# Patient Record
Sex: MALE | Race: WHITE | NOT HISPANIC OR LATINO | Employment: OTHER | URBAN - METROPOLITAN AREA
[De-identification: names, ages, dates, MRNs, and addresses within clinical notes are randomized per-mention and may not be internally consistent; named-entity substitution may affect disease eponyms.]

---

## 2017-04-24 ENCOUNTER — HOSPITAL ENCOUNTER (OUTPATIENT)
Dept: LAB | Facility: HOSPITAL | Age: 77
Discharge: HOME | End: 2017-04-24
Attending: NURSE PRACTITIONER | Admitting: NURSE PRACTITIONER

## 2017-04-24 LAB
ANION GAP SERPL CALC-SCNC: 14.5 MMOL/L (ref 10–20)
BUN SERPL-MCNC: 16 MG/DL (ref 8–20)
BUN/CREAT SERPL: 10.7 (ref 6.2–20.3)
CALCIUM SERPL-MCNC: 8.7 MG/DL (ref 8.9–10.3)
CHLORIDE SERPL-SCNC: 102 MMOL/L (ref 101–111)
CONV CO2: 29 MMOL/L (ref 22–32)
CREAT 24H UR-MCNC: 272.1 MG/DL
CREAT UR-MCNC: 1.5 MG/DL (ref 0.7–1.2)
ERYTHROCYTE [DISTWIDTH] IN BLOOD BY AUTOMATED COUNT: 15.4 % (ref 11.5–14.5)
GLUCOSE SERPL-MCNC: 103 MG/DL (ref 65–99)
HCT VFR BLD AUTO: 37.6 % (ref 40–54)
HGB BLD-MCNC: 12.7 G/DL (ref 14–18)
MCH RBC QN AUTO: 32.4 PG (ref 26–32)
MCHC RBC AUTO-ENTMCNC: 33.8 G/DL (ref 32–36)
MCV RBC AUTO: 95.9 FL (ref 80–94)
PLATELET # BLD AUTO: ABNORMAL 10*3/UL (ref 150–450)
PMV BLD AUTO: 8.4 FL (ref 7.4–10.4)
POTASSIUM SERPL-SCNC: 4.5 MMOL/L (ref 3.6–5.1)
PROT UR-MCNC: 23 MG/DL
RBC # BLD AUTO: ABNORMAL 10*6/UL (ref 4.6–6)
SODIUM SERPL-SCNC: 141 MMOL/L (ref 136–144)
WBC # BLD AUTO: 8.3 10*3/UL (ref 4.5–11.5)

## 2017-04-25 ENCOUNTER — HOSPITAL ENCOUNTER (OUTPATIENT)
Dept: LAB | Facility: HOSPITAL | Age: 77
Setting detail: SPECIMEN
Discharge: HOME | End: 2017-04-25
Attending: NURSE PRACTITIONER | Admitting: NURSE PRACTITIONER

## 2017-04-25 LAB
BACTERIA SPEC AEROBE CULT: NORMAL
BILIRUB UR QL STRIP: NEGATIVE MG/DL
CASTS URNS QL MICRO: ABNORMAL /[LPF]
COLOR UR: YELLOW
CONV BACTERIA IN URINE MICRO: ABNORMAL
CONV CLARITY OF URINE: ABNORMAL
CONV HYALINE CASTS IN URINE MICRO: 5 /[LPF] (ref 0–5)
CONV PROTEIN IN URINE BY AUTOMATED TEST STRIP: NEGATIVE MG/DL
CONV SMALL ROUND CELLS: ABNORMAL /[HPF]
CONV UROBILINOGEN IN URINE BY AUTOMATED TEST STRIP: 1 MG/DL
CULTURE INDICATED?: ABNORMAL
GLUCOSE UR QL: NEGATIVE MG/DL
HGB UR QL STRIP: NEGATIVE
KETONES UR QL STRIP: NEGATIVE MG/DL
LEUKOCYTE ESTERASE UR QL STRIP: ABNORMAL
Lab: NORMAL
MICRO REPORT STATUS: NORMAL
NITRITE UR QL STRIP: POSITIVE
PH UR STRIP.AUTO: 7.5 [PH] (ref 4.5–8)
RBC #/AREA URNS HPF: 2 /[HPF] (ref 0–3)
SP GR UR: 1.01 (ref 1–1.03)
SPECIMEN SOURCE: NORMAL
SPERM URNS QL MICRO: ABNORMAL /[HPF]
SQUAMOUS SPT QL MICRO: 5 /[HPF] (ref 0–5)
UNIDENT CRYS URNS QL MICRO: ABNORMAL /[HPF]
WBC #/AREA URNS HPF: 5 /[HPF] (ref 0–5)
YEAST SPEC QL WET PREP: ABNORMAL /[HPF]

## 2017-05-16 ENCOUNTER — HOSPITAL ENCOUNTER (OUTPATIENT)
Dept: PREADMISSION TESTING | Facility: HOSPITAL | Age: 77
Discharge: HOME | End: 2017-05-16
Attending: INTERNAL MEDICINE | Admitting: INTERNAL MEDICINE

## 2017-05-16 LAB
ANION GAP SERPL CALC-SCNC: 10.5 MMOL/L (ref 10–20)
BUN SERPL-MCNC: 22 MG/DL (ref 8–20)
BUN/CREAT SERPL: 14.7 (ref 6.2–20.3)
CALCIUM SERPL-MCNC: 8.6 MG/DL (ref 8.9–10.3)
CHLORIDE SERPL-SCNC: 105 MMOL/L (ref 101–111)
CONV CO2: 28 MMOL/L (ref 22–32)
CREAT UR-MCNC: 1.5 MG/DL (ref 0.7–1.2)
GLUCOSE SERPL-MCNC: 115 MG/DL (ref 65–99)
MAGNESIUM SERPL-MCNC: 2.1 MG/DL (ref 1.8–2.5)
POTASSIUM SERPL-SCNC: 4.5 MMOL/L (ref 3.6–5.1)
SODIUM SERPL-SCNC: 139 MMOL/L (ref 136–144)

## 2017-08-07 ENCOUNTER — HOSPITAL ENCOUNTER (OUTPATIENT)
Dept: LAB | Facility: HOSPITAL | Age: 77
Discharge: HOME | End: 2017-08-07
Attending: NURSE PRACTITIONER | Admitting: NURSE PRACTITIONER

## 2017-08-07 LAB
ANION GAP SERPL CALC-SCNC: 14.4 MMOL/L (ref 10–20)
BACTERIA SPEC AEROBE CULT: NORMAL
BASOPHILS # BLD AUTO: 0 10*3/UL (ref 0–0.2)
BASOPHILS NFR BLD AUTO: 0 % (ref 0–2)
BILIRUB UR QL STRIP: NEGATIVE MG/DL
BUN SERPL-MCNC: 21 MG/DL (ref 8–20)
BUN/CREAT SERPL: 13.1 (ref 6.2–20.3)
CALCIUM SERPL-MCNC: 9.1 MG/DL (ref 8.9–10.3)
CASTS URNS QL MICRO: ABNORMAL /[LPF]
CHLORIDE SERPL-SCNC: 102 MMOL/L (ref 101–111)
COLOR UR: YELLOW
CONV BACTERIA IN URINE MICRO: ABNORMAL
CONV CLARITY OF URINE: ABNORMAL
CONV CO2: 26 MMOL/L (ref 22–32)
CONV HYALINE CASTS IN URINE MICRO: 3 /[LPF] (ref 0–5)
CONV PROTEIN IN URINE BY AUTOMATED TEST STRIP: NEGATIVE MG/DL
CONV SMALL ROUND CELLS: ABNORMAL /[HPF]
CONV UROBILINOGEN IN URINE BY AUTOMATED TEST STRIP: 0.2 MG/DL
CREAT 24H UR-MCNC: 153.6 MG/DL
CREAT UR-MCNC: 1.6 MG/DL (ref 0.7–1.2)
CULTURE INDICATED?: ABNORMAL
DIFFERENTIAL METHOD BLD: (no result)
EOSINOPHIL # BLD AUTO: 0.1 10*3/UL (ref 0–0.3)
EOSINOPHIL # BLD AUTO: 2 % (ref 0–3)
ERYTHROCYTE [DISTWIDTH] IN BLOOD BY AUTOMATED COUNT: 16.5 % (ref 11.5–14.5)
GLUCOSE SERPL-MCNC: 141 MG/DL (ref 65–99)
GLUCOSE UR QL: NEGATIVE MG/DL
HCT VFR BLD AUTO: 34.9 % (ref 40–54)
HGB BLD-MCNC: 11.2 G/DL (ref 14–18)
HGB UR QL STRIP: NEGATIVE
KETONES UR QL STRIP: NEGATIVE MG/DL
LEUKOCYTE ESTERASE UR QL STRIP: ABNORMAL
LYMPHOCYTES # BLD AUTO: 1 10*3/UL (ref 0.8–4.8)
LYMPHOCYTES NFR BLD AUTO: 11 % (ref 18–42)
Lab: NORMAL
MCH RBC QN AUTO: 30.1 PG (ref 26–32)
MCHC RBC AUTO-ENTMCNC: 32.1 G/DL (ref 32–36)
MCV RBC AUTO: 93.7 FL (ref 80–94)
MICRO REPORT STATUS: NORMAL
MONOCYTES # BLD AUTO: 0.8 10*3/UL (ref 0.1–1.3)
MONOCYTES NFR BLD AUTO: 10 % (ref 2–11)
NEUTROPHILS # BLD AUTO: 6.7 10*3/UL (ref 2.3–8.6)
NEUTROPHILS NFR BLD AUTO: 77 % (ref 50–75)
NITRITE UR QL STRIP: NEGATIVE
NRBC BLD AUTO-RTO: 0 /100{WBCS}
NRBC/RBC NFR BLD MANUAL: 0 10*3/UL
PH UR STRIP.AUTO: 5.5 [PH] (ref 4.5–8)
PHOSPHATE SERPL-MCNC: 4 MG/DL (ref 2.4–4.7)
PLATELET # BLD AUTO: 336 10*3/UL (ref 150–450)
PMV BLD AUTO: 7.5 FL (ref 7.4–10.4)
POTASSIUM SERPL-SCNC: 4.4 MMOL/L (ref 3.6–5.1)
PROT UR-MCNC: 9 MG/DL
PTH-INTACT SERPL-MCNC: 85 PG/ML (ref 11–72)
RBC # BLD AUTO: 3.72 10*6/UL (ref 4.6–6)
RBC #/AREA URNS HPF: 11 /[HPF] (ref 0–3)
SODIUM SERPL-SCNC: 138 MMOL/L (ref 136–144)
SP GR UR: 1.01 (ref 1–1.03)
SPECIMEN SOURCE: NORMAL
SPERM URNS QL MICRO: ABNORMAL /[HPF]
SQUAMOUS SPT QL MICRO: 3 /[HPF] (ref 0–5)
UNIDENT CRYS URNS QL MICRO: ABNORMAL /[HPF]
URATE SERPL-MCNC: 5.9 MG/DL (ref 4.8–8.7)
WBC # BLD AUTO: 8.7 10*3/UL (ref 4.5–11.5)
WBC #/AREA URNS HPF: 45 /[HPF] (ref 0–5)
YEAST SPEC QL WET PREP: ABNORMAL /[HPF]

## 2017-09-15 ENCOUNTER — HOSPITAL ENCOUNTER (OUTPATIENT)
Dept: URGENT CARE | Facility: CLINIC | Age: 77
Setting detail: SPECIMEN
Discharge: HOME | End: 2017-09-15
Attending: FAMILY MEDICINE | Admitting: FAMILY MEDICINE

## 2017-09-27 ENCOUNTER — HOSPITAL ENCOUNTER (OUTPATIENT)
Dept: WOUND CARE | Facility: HOSPITAL | Age: 77
Discharge: HOME | End: 2017-09-27
Attending: NURSE PRACTITIONER | Admitting: NURSE PRACTITIONER

## 2017-09-29 ENCOUNTER — HOSPITAL ENCOUNTER (OUTPATIENT)
Dept: CARDIOLOGY | Facility: HOSPITAL | Age: 77
Discharge: HOME | End: 2017-09-29
Attending: NURSE PRACTITIONER | Admitting: NURSE PRACTITIONER

## 2017-10-02 ENCOUNTER — HOSPITAL ENCOUNTER (OUTPATIENT)
Dept: WOUND CARE | Facility: HOSPITAL | Age: 77
Discharge: HOME | End: 2017-10-02
Attending: PODIATRIST | Admitting: PODIATRIST

## 2017-10-06 ENCOUNTER — HOSPITAL ENCOUNTER (OUTPATIENT)
Dept: WOUND CARE | Facility: HOSPITAL | Age: 77
Discharge: HOME | End: 2017-10-06
Attending: NURSE PRACTITIONER | Admitting: NURSE PRACTITIONER

## 2017-10-09 ENCOUNTER — HOSPITAL ENCOUNTER (OUTPATIENT)
Dept: WOUND CARE | Facility: HOSPITAL | Age: 77
Discharge: HOME | End: 2017-10-09
Attending: NURSE PRACTITIONER | Admitting: NURSE PRACTITIONER

## 2017-10-12 ENCOUNTER — HOSPITAL ENCOUNTER (OUTPATIENT)
Dept: WOUND CARE | Facility: HOSPITAL | Age: 77
Discharge: HOME | End: 2017-10-12
Attending: NURSE PRACTITIONER | Admitting: NURSE PRACTITIONER

## 2017-10-16 ENCOUNTER — HOSPITAL ENCOUNTER (OUTPATIENT)
Dept: WOUND CARE | Facility: HOSPITAL | Age: 77
Discharge: HOME | End: 2017-10-16
Attending: NURSE PRACTITIONER | Admitting: NURSE PRACTITIONER

## 2017-10-18 ENCOUNTER — HOSPITAL ENCOUNTER (OUTPATIENT)
Dept: WOUND CARE | Facility: HOSPITAL | Age: 77
Discharge: HOME | End: 2017-10-18
Attending: NURSE PRACTITIONER | Admitting: NURSE PRACTITIONER

## 2017-10-23 ENCOUNTER — HOSPITAL ENCOUNTER (OUTPATIENT)
Dept: WOUND CARE | Facility: HOSPITAL | Age: 77
Discharge: HOME | End: 2017-10-23
Attending: NURSE PRACTITIONER | Admitting: NURSE PRACTITIONER

## 2017-10-25 ENCOUNTER — HOSPITAL ENCOUNTER (OUTPATIENT)
Dept: WOUND CARE | Facility: HOSPITAL | Age: 77
Discharge: HOME | End: 2017-10-25
Attending: NURSE PRACTITIONER | Admitting: NURSE PRACTITIONER

## 2017-10-27 ENCOUNTER — HOSPITAL ENCOUNTER (OUTPATIENT)
Dept: WOUND CARE | Facility: HOSPITAL | Age: 77
Discharge: HOME | End: 2017-10-27
Attending: NURSE PRACTITIONER | Admitting: NURSE PRACTITIONER

## 2017-10-31 ENCOUNTER — HOSPITAL ENCOUNTER (OUTPATIENT)
Dept: WOUND CARE | Facility: HOSPITAL | Age: 77
Discharge: HOME | End: 2017-10-31
Attending: NURSE PRACTITIONER | Admitting: NURSE PRACTITIONER

## 2017-11-03 ENCOUNTER — HOSPITAL ENCOUNTER (OUTPATIENT)
Dept: WOUND CARE | Facility: HOSPITAL | Age: 77
Discharge: HOME | End: 2017-11-03
Attending: NURSE PRACTITIONER | Admitting: NURSE PRACTITIONER

## 2017-11-07 ENCOUNTER — HOSPITAL ENCOUNTER (OUTPATIENT)
Dept: WOUND CARE | Facility: HOSPITAL | Age: 77
Discharge: HOME | End: 2017-11-07
Attending: NURSE PRACTITIONER | Admitting: NURSE PRACTITIONER

## 2017-11-10 ENCOUNTER — HOSPITAL ENCOUNTER (OUTPATIENT)
Dept: WOUND CARE | Facility: HOSPITAL | Age: 77
Discharge: HOME | End: 2017-11-10
Attending: NURSE PRACTITIONER | Admitting: NURSE PRACTITIONER

## 2017-11-14 ENCOUNTER — HOSPITAL ENCOUNTER (OUTPATIENT)
Dept: WOUND CARE | Facility: HOSPITAL | Age: 77
Discharge: HOME | End: 2017-11-14
Attending: NURSE PRACTITIONER | Admitting: NURSE PRACTITIONER

## 2017-11-17 ENCOUNTER — HOSPITAL ENCOUNTER (OUTPATIENT)
Dept: WOUND CARE | Facility: HOSPITAL | Age: 77
Discharge: HOME | End: 2017-11-17
Attending: NURSE PRACTITIONER | Admitting: NURSE PRACTITIONER

## 2017-11-24 ENCOUNTER — HOSPITAL ENCOUNTER (OUTPATIENT)
Dept: WOUND CARE | Facility: HOSPITAL | Age: 77
Discharge: HOME | End: 2017-11-24
Attending: NURSE PRACTITIONER | Admitting: NURSE PRACTITIONER

## 2017-12-01 ENCOUNTER — HOSPITAL ENCOUNTER (OUTPATIENT)
Dept: WOUND CARE | Facility: HOSPITAL | Age: 77
Discharge: HOME | End: 2017-12-01
Attending: NURSE PRACTITIONER | Admitting: NURSE PRACTITIONER

## 2018-02-01 ENCOUNTER — HOSPITAL ENCOUNTER (OUTPATIENT)
Dept: LAB | Facility: HOSPITAL | Age: 78
Discharge: HOME | End: 2018-02-01
Attending: INTERNAL MEDICINE | Admitting: INTERNAL MEDICINE

## 2018-02-01 LAB
ANION GAP SERPL CALC-SCNC: 10.5 MMOL/L (ref 10–20)
BUN SERPL-MCNC: 28 MG/DL (ref 8–20)
BUN/CREAT SERPL: 14.7 (ref 6.2–20.3)
CALCIUM SERPL-MCNC: 8.6 MG/DL (ref 8.9–10.3)
CHLORIDE SERPL-SCNC: 103 MMOL/L (ref 101–111)
CONV CO2: 26 MMOL/L (ref 22–32)
CREAT UR-MCNC: 1.9 MG/DL (ref 0.7–1.2)
ERYTHROCYTE [DISTWIDTH] IN BLOOD BY AUTOMATED COUNT: 18.5 % (ref 11.5–14.5)
GLUCOSE SERPL-MCNC: 152 MG/DL (ref 65–99)
HCT VFR BLD AUTO: 31.9 % (ref 40–54)
HGB BLD-MCNC: 10.1 G/DL (ref 14–18)
MCH RBC QN AUTO: 27.3 PG (ref 26–32)
MCHC RBC AUTO-ENTMCNC: 31.8 G/DL (ref 32–36)
MCV RBC AUTO: 85.8 FL (ref 80–94)
PHOSPHATE SERPL-MCNC: 3.7 MG/DL (ref 2.4–4.7)
PLATELET # BLD AUTO: ABNORMAL 10*3/UL (ref 150–450)
PMV BLD AUTO: 7.2 FL (ref 7.4–10.4)
POTASSIUM SERPL-SCNC: 4.5 MMOL/L (ref 3.6–5.1)
RBC # BLD AUTO: 3.72 10*6/UL (ref 4.6–6)
SODIUM SERPL-SCNC: 135 MMOL/L (ref 136–144)
WBC # BLD AUTO: 7.4 10*3/UL (ref 4.5–11.5)

## 2018-07-26 ENCOUNTER — HOSPITAL ENCOUNTER (OUTPATIENT)
Dept: LAB | Facility: HOSPITAL | Age: 78
Discharge: HOME | End: 2018-07-26
Attending: INTERNAL MEDICINE | Admitting: INTERNAL MEDICINE

## 2018-07-26 LAB
AMPICILLIN SUSC ISLT: ABNORMAL
ANION GAP SERPL CALC-SCNC: 10 MMOL/L (ref 10–20)
AZTREONAM SUSC ISLT: ABNORMAL
BACTERIA ISLT: ABNORMAL
BACTERIA SPEC AEROBE CULT: ABNORMAL
BILIRUB UR QL STRIP: NEGATIVE MG/DL
BUN SERPL-MCNC: 17 MG/DL (ref 8–20)
BUN/CREAT SERPL: 8.9 (ref 6.2–20.3)
CALCIUM SERPL-MCNC: 9.9 MG/DL (ref 8.9–10.3)
CASTS URNS QL MICRO: ABNORMAL /[LPF]
CEFAZOLIN SUSC ISLT: ABNORMAL
CEFEPIME SUSC ISLT: ABNORMAL
CEFTRIAXONE SUSC ISLT: ABNORMAL
CHLORIDE SERPL-SCNC: 104 MMOL/L (ref 101–111)
CIPROFLOXACIN SUSC ISLT: ABNORMAL
COLONY COUNT: ABNORMAL
COLOR UR: YELLOW
CONV BACTERIA IN URINE MICRO: ABNORMAL
CONV CLARITY OF URINE: CLEAR
CONV CO2: 26 MMOL/L (ref 22–32)
CONV HYALINE CASTS IN URINE MICRO: 3 /[LPF] (ref 0–5)
CONV PROTEIN IN URINE BY AUTOMATED TEST STRIP: NEGATIVE MG/DL
CONV SMALL ROUND CELLS: ABNORMAL /[HPF]
CONV UROBILINOGEN IN URINE BY AUTOMATED TEST STRIP: 0.2 MG/DL
CREAT 24H UR-MCNC: 111.3 MG/DL
CREAT UR-MCNC: 1.9 MG/DL (ref 0.7–1.2)
CULTURE INDICATED?: ABNORMAL
ERYTHROCYTE [DISTWIDTH] IN BLOOD BY AUTOMATED COUNT: 14.6 % (ref 11.5–14.5)
GLUCOSE SERPL-MCNC: 101 MG/DL (ref 65–99)
GLUCOSE UR QL: NEGATIVE MG/DL
HCT VFR BLD AUTO: 39.2 % (ref 40–54)
HGB BLD-MCNC: 13 G/DL (ref 14–18)
HGB UR QL STRIP: NEGATIVE
KETONES UR QL STRIP: NEGATIVE MG/DL
LEUKOCYTE ESTERASE UR QL STRIP: ABNORMAL
LEVOFLOXACIN SUSC ISLT: ABNORMAL
Lab: ABNORMAL
MCH RBC QN AUTO: 33.5 PG (ref 26–32)
MCHC RBC AUTO-ENTMCNC: 33 G/DL (ref 32–36)
MCV RBC AUTO: 101.5 FL (ref 80–94)
MEROPENEM SUSC ISLT: ABNORMAL
MICRO REPORT STATUS: ABNORMAL
NITRITE UR QL STRIP: NEGATIVE
NITROFURANTOIN SUSC ISLT: ABNORMAL
PH UR STRIP.AUTO: 5.5 [PH] (ref 4.5–8)
PIP+TAZO SUSC ISLT: ABNORMAL
PLATELET # BLD AUTO: 247 10*3/UL (ref 150–450)
PMV BLD AUTO: 9.1 FL (ref 7.4–10.4)
POTASSIUM SERPL-SCNC: 4 MMOL/L (ref 3.6–5.1)
PROT UR-MCNC: 6 MG/DL
PROT/CREAT UR: 0.1 MG/MG (ref 0–22)
RBC # BLD AUTO: 3.87 10*6/UL (ref 4.6–6)
RBC #/AREA URNS HPF: 1 /[HPF] (ref 0–3)
SODIUM SERPL-SCNC: 136 MMOL/L (ref 136–144)
SP GR UR: 1.01 (ref 1–1.03)
SPECIMEN SOURCE: ABNORMAL
SPERM URNS QL MICRO: ABNORMAL /[HPF]
SQUAMOUS SPT QL MICRO: 7 /[HPF] (ref 0–5)
SUSC METH SPEC: ABNORMAL
TETRACYCLINE SUSC ISLT: ABNORMAL
TOBRAMYCIN SUSC ISLT: ABNORMAL
TRIMETHOPRIM/SULFA: ABNORMAL
UNIDENT CRYS URNS QL MICRO: ABNORMAL /[HPF]
WBC # BLD AUTO: 8.3 10*3/UL (ref 4.5–11.5)
WBC #/AREA URNS HPF: 6 /[HPF] (ref 0–5)
YEAST SPEC QL WET PREP: ABNORMAL /[HPF]

## 2018-09-04 ENCOUNTER — HOSPITAL ENCOUNTER (OUTPATIENT)
Dept: CARDIOLOGY | Facility: HOSPITAL | Age: 78
Discharge: HOME | End: 2018-09-04
Attending: SURGERY | Admitting: SURGERY

## 2018-09-24 ENCOUNTER — HOSPITAL ENCOUNTER (OUTPATIENT)
Dept: LAB | Facility: HOSPITAL | Age: 78
Discharge: HOME | End: 2018-09-24
Attending: INTERNAL MEDICINE | Admitting: INTERNAL MEDICINE

## 2018-09-24 LAB
ALBUMIN SERPL-MCNC: 2.9 G/DL (ref 3.5–4.8)
ALBUMIN/GLOB SERPL: 1.1 {RATIO} (ref 1–1.7)
ALP SERPL-CCNC: 69 IU/L (ref 32–91)
ALT SERPL-CCNC: 9 IU/L (ref 17–63)
ANION GAP SERPL CALC-SCNC: 12.3 MMOL/L (ref 10–20)
AST SERPL-CCNC: 15 IU/L (ref 15–41)
BILIRUB SERPL-MCNC: 0.7 MG/DL (ref 0.3–1.2)
BUN SERPL-MCNC: 18 MG/DL (ref 8–20)
BUN/CREAT SERPL: 8.2 (ref 6.2–20.3)
CALCIUM SERPL-MCNC: 8.6 MG/DL (ref 8.9–10.3)
CHLORIDE SERPL-SCNC: 103 MMOL/L (ref 101–111)
CONV CO2: 27 MMOL/L (ref 22–32)
CONV TOTAL PROTEIN: 5.5 G/DL (ref 6.1–7.9)
CREAT UR-MCNC: 2.2 MG/DL (ref 0.7–1.2)
GLOBULIN UR ELPH-MCNC: 2.6 G/DL (ref 2.5–3.8)
GLUCOSE SERPL-MCNC: 87 MG/DL (ref 65–99)
MAGNESIUM SERPL-MCNC: 2 MG/DL (ref 1.8–2.5)
POTASSIUM SERPL-SCNC: 4.3 MMOL/L (ref 3.6–5.1)
SODIUM SERPL-SCNC: 138 MMOL/L (ref 136–144)

## 2018-10-10 ENCOUNTER — HOSPITAL ENCOUNTER (OUTPATIENT)
Dept: CARDIOLOGY | Facility: HOSPITAL | Age: 78
Discharge: HOME | End: 2018-10-10
Attending: INTERNAL MEDICINE | Admitting: INTERNAL MEDICINE

## 2018-10-16 ENCOUNTER — HOSPITAL ENCOUNTER (OUTPATIENT)
Dept: PREADMISSION TESTING | Facility: HOSPITAL | Age: 78
Discharge: HOME | End: 2018-10-16
Attending: INTERNAL MEDICINE | Admitting: INTERNAL MEDICINE

## 2018-10-16 LAB
ANION GAP SERPL CALC-SCNC: 15 MMOL/L (ref 10–20)
BASOPHILS # BLD AUTO: 0 10*3/UL (ref 0–0.2)
BASOPHILS NFR BLD AUTO: 0 % (ref 0–2)
BUN SERPL-MCNC: 19 MG/DL (ref 8–20)
BUN/CREAT SERPL: 7.9 (ref 6.2–20.3)
CALCIUM SERPL-MCNC: 8.7 MG/DL (ref 8.9–10.3)
CHLORIDE SERPL-SCNC: 106 MMOL/L (ref 101–111)
CHOLEST SERPL-MCNC: 127 MG/DL
CHOLEST/HDLC SERPL: 3.5 {RATIO}
CONV CO2: 26 MMOL/L (ref 22–32)
CONV LDL CHOLESTEROL DIRECT: 77 MG/DL (ref 0–100)
CREAT UR-MCNC: 2.4 MG/DL (ref 0.7–1.2)
DIFFERENTIAL METHOD BLD: (no result)
EOSINOPHIL # BLD AUTO: 0.2 10*3/UL (ref 0–0.3)
EOSINOPHIL # BLD AUTO: 3 % (ref 0–3)
ERYTHROCYTE [DISTWIDTH] IN BLOOD BY AUTOMATED COUNT: 14.6 % (ref 11.5–14.5)
GLUCOSE SERPL-MCNC: 81 MG/DL (ref 65–99)
HCT VFR BLD AUTO: 39.2 % (ref 40–54)
HDLC SERPL-MCNC: 37 MG/DL
HGB BLD-MCNC: 13.2 G/DL (ref 14–18)
INR PPP: 1.1
LDLC/HDLC SERPL: 2.1 {RATIO}
LIPID INTERPRETATION: ABNORMAL
LYMPHOCYTES # BLD AUTO: 1 10*3/UL (ref 0.8–4.8)
LYMPHOCYTES NFR BLD AUTO: 11 % (ref 18–42)
MCH RBC QN AUTO: 35 PG (ref 26–32)
MCHC RBC AUTO-ENTMCNC: 33.7 G/DL (ref 32–36)
MCV RBC AUTO: 103.8 FL (ref 80–94)
MONOCYTES # BLD AUTO: 0.9 10*3/UL (ref 0.1–1.3)
MONOCYTES NFR BLD AUTO: 10 % (ref 2–11)
NEUTROPHILS # BLD AUTO: 6.7 10*3/UL (ref 2.3–8.6)
NEUTROPHILS NFR BLD AUTO: 76 % (ref 50–75)
NRBC BLD AUTO-RTO: 0 /100{WBCS}
NRBC/RBC NFR BLD MANUAL: 0 10*3/UL
PLATELET # BLD AUTO: 229 10*3/UL (ref 150–450)
PMV BLD AUTO: 8.8 FL (ref 7.4–10.4)
POTASSIUM SERPL-SCNC: 4 MMOL/L (ref 3.6–5.1)
PROTHROMBIN TIME: 10.8 SEC (ref 9.6–11.7)
RBC # BLD AUTO: 3.78 10*6/UL (ref 4.6–6)
SODIUM SERPL-SCNC: 143 MMOL/L (ref 136–144)
TRIGL SERPL-MCNC: 154 MG/DL
VLDLC SERPL CALC-MCNC: 12.8 MG/DL
WBC # BLD AUTO: 8.8 10*3/UL (ref 4.5–11.5)

## 2018-12-10 ENCOUNTER — HOSPITAL ENCOUNTER (OUTPATIENT)
Dept: LAB | Facility: HOSPITAL | Age: 78
Discharge: HOME | End: 2018-12-10
Attending: INTERNAL MEDICINE | Admitting: INTERNAL MEDICINE

## 2018-12-10 LAB
ANION GAP SERPL CALC-SCNC: 11.8 MMOL/L (ref 10–20)
BUN SERPL-MCNC: 31 MG/DL (ref 8–20)
BUN/CREAT SERPL: 12.9 (ref 6.2–20.3)
CALCIUM SERPL-MCNC: 9.1 MG/DL (ref 8.9–10.3)
CHLORIDE SERPL-SCNC: 102 MMOL/L (ref 101–111)
CONV CO2: 28 MMOL/L (ref 22–32)
CREAT UR-MCNC: 2.4 MG/DL (ref 0.7–1.2)
GLUCOSE SERPL-MCNC: 78 MG/DL (ref 65–99)
POTASSIUM SERPL-SCNC: 3.8 MMOL/L (ref 3.6–5.1)
SODIUM SERPL-SCNC: 138 MMOL/L (ref 136–144)

## 2019-01-01 ENCOUNTER — CLINICAL SUPPORT NO REQUIREMENTS (OUTPATIENT)
Dept: CARDIOLOGY | Facility: CLINIC | Age: 79
End: 2019-01-01

## 2019-01-01 ENCOUNTER — OFFICE VISIT (OUTPATIENT)
Dept: CARDIOLOGY | Facility: CLINIC | Age: 79
End: 2019-01-01

## 2019-01-01 ENCOUNTER — HOSPITAL ENCOUNTER (OUTPATIENT)
Dept: LAB | Facility: HOSPITAL | Age: 79
Discharge: HOME | End: 2019-02-15
Attending: INTERNAL MEDICINE | Admitting: INTERNAL MEDICINE

## 2019-01-01 ENCOUNTER — TRANSCRIBE ORDERS (OUTPATIENT)
Dept: ADMINISTRATIVE | Facility: HOSPITAL | Age: 79
End: 2019-01-01

## 2019-01-01 ENCOUNTER — LAB (OUTPATIENT)
Dept: LAB | Facility: HOSPITAL | Age: 79
End: 2019-01-01

## 2019-01-01 ENCOUNTER — HOSPITAL ENCOUNTER (OUTPATIENT)
Dept: OTHER | Facility: HOSPITAL | Age: 79
Discharge: HOME | End: 2019-06-11
Attending: FAMILY MEDICINE | Admitting: FAMILY MEDICINE

## 2019-01-01 ENCOUNTER — HOSPITAL ENCOUNTER (OUTPATIENT)
Dept: LAB | Facility: HOSPITAL | Age: 79
Discharge: HOME | End: 2019-04-17
Attending: FAMILY MEDICINE | Admitting: FAMILY MEDICINE

## 2019-01-01 ENCOUNTER — HOSPITAL ENCOUNTER (OUTPATIENT)
Dept: LAB | Facility: HOSPITAL | Age: 79
Discharge: HOME | End: 2019-02-04
Attending: INTERNAL MEDICINE | Admitting: INTERNAL MEDICINE

## 2019-01-01 ENCOUNTER — TELEPHONE (OUTPATIENT)
Dept: CARDIOLOGY | Facility: CLINIC | Age: 79
End: 2019-01-01

## 2019-01-01 ENCOUNTER — HOSPITAL ENCOUNTER (OUTPATIENT)
Dept: LAB | Facility: HOSPITAL | Age: 79
Discharge: HOME | End: 2019-05-31
Attending: INTERNAL MEDICINE | Admitting: INTERNAL MEDICINE

## 2019-01-01 ENCOUNTER — HOSPITAL ENCOUNTER (OUTPATIENT)
Facility: HOSPITAL | Age: 79
Setting detail: HOSPITAL OUTPATIENT SURGERY
Discharge: HOME | End: 2019-09-06
Attending: INTERNAL MEDICINE | Admitting: INTERNAL MEDICINE

## 2019-01-01 ENCOUNTER — HOSPITAL ENCOUNTER (OUTPATIENT)
Dept: CARDIOLOGY | Facility: HOSPITAL | Age: 79
Discharge: HOME | End: 2019-09-03
Admitting: INTERNAL MEDICINE

## 2019-01-01 VITALS
OXYGEN SATURATION: 100 % | BODY MASS INDEX: 24.63 KG/M2 | WEIGHT: 148 LBS | DIASTOLIC BLOOD PRESSURE: 64 MMHG | HEART RATE: 102 BPM | SYSTOLIC BLOOD PRESSURE: 91 MMHG

## 2019-01-01 VITALS
TEMPERATURE: 97.5 F | WEIGHT: 145.72 LBS | HEART RATE: 102 BPM | HEIGHT: 65 IN | SYSTOLIC BLOOD PRESSURE: 101 MMHG | RESPIRATION RATE: 20 BRPM | OXYGEN SATURATION: 79 % | BODY MASS INDEX: 24.28 KG/M2 | DIASTOLIC BLOOD PRESSURE: 73 MMHG

## 2019-01-01 VITALS
SYSTOLIC BLOOD PRESSURE: 104 MMHG | OXYGEN SATURATION: 99 % | HEART RATE: 119 BPM | WEIGHT: 152.75 LBS | DIASTOLIC BLOOD PRESSURE: 71 MMHG | BODY MASS INDEX: 21.92 KG/M2

## 2019-01-01 DIAGNOSIS — N18.30 CRD (CHRONIC RENAL DISEASE), STAGE III (HCC): Primary | ICD-10-CM

## 2019-01-01 DIAGNOSIS — I25.5 ISCHEMIC CARDIOMYOPATHY: ICD-10-CM

## 2019-01-01 DIAGNOSIS — I50.9 CONGESTIVE HEART FAILURE, UNSPECIFIED HF CHRONICITY, UNSPECIFIED HEART FAILURE TYPE (HCC): ICD-10-CM

## 2019-01-01 DIAGNOSIS — I10 ESSENTIAL HYPERTENSION: ICD-10-CM

## 2019-01-01 DIAGNOSIS — E55.9 VITAMIN D DEFICIENCY: ICD-10-CM

## 2019-01-01 DIAGNOSIS — I25.5 ISCHEMIC CARDIOMYOPATHY: Primary | ICD-10-CM

## 2019-01-01 DIAGNOSIS — Z95.810 ICD (IMPLANTABLE CARDIOVERTER-DEFIBRILLATOR), DUAL, IN SITU: Primary | ICD-10-CM

## 2019-01-01 DIAGNOSIS — N18.30 CRD (CHRONIC RENAL DISEASE), STAGE III (HCC): ICD-10-CM

## 2019-01-01 DIAGNOSIS — Z95.810 PRESENCE OF AUTOMATIC CARDIOVERTER/DEFIBRILLATOR (AICD): ICD-10-CM

## 2019-01-01 DIAGNOSIS — Z95.810 PRESENCE OF AUTOMATIC CARDIOVERTER/DEFIBRILLATOR (AICD): Primary | ICD-10-CM

## 2019-01-01 DIAGNOSIS — I42.9 CARDIOMYOPATHY, UNSPECIFIED TYPE (HCC): Primary | ICD-10-CM

## 2019-01-01 DIAGNOSIS — Z95.810 ICD (IMPLANTABLE CARDIOVERTER-DEFIBRILLATOR), DUAL, IN SITU: ICD-10-CM

## 2019-01-01 DIAGNOSIS — Z95.1 HX OF CABG: ICD-10-CM

## 2019-01-01 LAB
25(OH)D3 SERPL-MCNC: 57.5 NG/ML (ref 30–100)
ALBUMIN SERPL-MCNC: 3.1 G/DL (ref 3.5–4.8)
ALBUMIN SERPL-MCNC: 3.3 G/DL (ref 3.5–4.8)
ALBUMIN/GLOB SERPL: 1.1 {RATIO} (ref 1–1.7)
ALBUMIN/GLOB SERPL: 1.2 {RATIO} (ref 1–1.7)
ALP SERPL-CCNC: 60 IU/L (ref 32–91)
ALP SERPL-CCNC: 80 IU/L (ref 32–91)
ALT SERPL-CCNC: 14 IU/L (ref 17–63)
ALT SERPL-CCNC: 16 IU/L (ref 17–63)
ANION GAP SERPL CALC-SCNC: 12.8 MMOL/L (ref 10–20)
ANION GAP SERPL CALC-SCNC: 14.7 MMOL/L (ref 10–20)
ANION GAP SERPL CALC-SCNC: 15.7 MMOL/L (ref 10–20)
ANION GAP SERPL CALC-SCNC: 18.2 MMOL/L (ref 10–20)
ANION GAP SERPL CALCULATED.3IONS-SCNC: 11.5 MMOL/L (ref 5–15)
ANION GAP SERPL CALCULATED.3IONS-SCNC: 14.8 MMOL/L (ref 5–15)
ANISOCYTOSIS BLD QL: ABNORMAL
AST SERPL-CCNC: 17 IU/L (ref 15–41)
AST SERPL-CCNC: 24 IU/L (ref 15–41)
BASOPHILS # BLD AUTO: 0 10*3/UL (ref 0–0.2)
BASOPHILS # BLD AUTO: 0 10*3/UL (ref 0–0.2)
BASOPHILS # BLD MANUAL: 0.07 10*3/MM3 (ref 0–0.2)
BASOPHILS NFR BLD AUTO: 0 % (ref 0–2)
BASOPHILS NFR BLD AUTO: 1 % (ref 0–1.5)
BASOPHILS NFR BLD AUTO: 1 % (ref 0–2)
BILIRUB SERPL-MCNC: 0.9 MG/DL (ref 0.3–1.2)
BILIRUB SERPL-MCNC: 1.3 MG/DL (ref 0.3–1.2)
BUN BLD-MCNC: 23 MG/DL (ref 8–20)
BUN BLD-MCNC: 23 MG/DL (ref 8–23)
BUN SERPL-MCNC: 20 MG/DL (ref 8–20)
BUN SERPL-MCNC: 31 MG/DL (ref 8–20)
BUN SERPL-MCNC: 46 MG/DL (ref 8–20)
BUN SERPL-MCNC: 59 MG/DL (ref 8–20)
BUN/CREAT SERPL: 11 (ref 6.2–20.3)
BUN/CREAT SERPL: 13.5 (ref 6.2–20.3)
BUN/CREAT SERPL: 13.5 (ref 7–25)
BUN/CREAT SERPL: 19.2 (ref 6.2–20.3)
BUN/CREAT SERPL: 26.8 (ref 6.2–20.3)
BUN/CREAT SERPL: 8.3 (ref 6.2–20.3)
BURR CELLS BLD QL SMEAR: ABNORMAL
CALCIUM SERPL-MCNC: 9 MG/DL (ref 8.9–10.3)
CALCIUM SERPL-MCNC: 9 MG/DL (ref 8.9–10.3)
CALCIUM SERPL-MCNC: 9.4 MG/DL (ref 8.9–10.3)
CALCIUM SERPL-MCNC: 9.6 MG/DL (ref 8.9–10.3)
CALCIUM SPEC-SCNC: 9 MG/DL (ref 8.9–10.3)
CALCIUM SPEC-SCNC: 9.2 MG/DL (ref 8.6–10.5)
CHLORIDE SERPL-SCNC: 100 MMOL/L (ref 101–111)
CHLORIDE SERPL-SCNC: 101 MMOL/L (ref 101–111)
CHLORIDE SERPL-SCNC: 102 MMOL/L (ref 101–111)
CHLORIDE SERPL-SCNC: 105 MMOL/L (ref 101–111)
CHLORIDE SERPL-SCNC: 106 MMOL/L (ref 98–107)
CHLORIDE SERPL-SCNC: 99 MMOL/L (ref 101–111)
CHOLEST SERPL-MCNC: 134 MG/DL
CHOLEST/HDLC SERPL: 2.8 {RATIO}
CO2 SERPL-SCNC: 25 MMOL/L (ref 22–32)
CO2 SERPL-SCNC: 27.5 MMOL/L (ref 22–29)
CONV CO2: 24 MMOL/L (ref 22–32)
CONV CO2: 24 MMOL/L (ref 22–32)
CONV CO2: 27 MMOL/L (ref 22–32)
CONV CO2: 28 MMOL/L (ref 22–32)
CONV LDL CHOLESTEROL DIRECT: 79 MG/DL (ref 0–100)
CONV TOTAL PROTEIN: 6 G/DL (ref 6.1–7.9)
CONV TOTAL PROTEIN: 6.1 G/DL (ref 6.1–7.9)
CREAT BLD-MCNC: 1.71 MG/DL (ref 0.76–1.27)
CREAT BLD-MCNC: 2.1 MG/DL (ref 0.7–1.2)
CREAT UR-MCNC: 2.2 MG/DL (ref 0.7–1.2)
CREAT UR-MCNC: 2.3 MG/DL (ref 0.7–1.2)
CREAT UR-MCNC: 2.4 MG/DL (ref 0.7–1.2)
CREAT UR-MCNC: 2.4 MG/DL (ref 0.7–1.2)
DEPRECATED RDW RBC AUTO: 50.8 FL (ref 37–54)
DEPRECATED RDW RBC AUTO: 51.5 FL (ref 37–54)
DIFFERENTIAL METHOD BLD: (no result)
DIFFERENTIAL METHOD BLD: (no result)
EOSINOPHIL # BLD AUTO: 0.1 10*3/UL (ref 0–0.3)
EOSINOPHIL # BLD AUTO: 0.1 10*3/UL (ref 0–0.3)
EOSINOPHIL # BLD AUTO: 1 % (ref 0–3)
EOSINOPHIL # BLD AUTO: 2 % (ref 0–3)
ERYTHROCYTE [DISTWIDTH] IN BLOOD BY AUTOMATED COUNT: 13.1 % (ref 12.3–15.4)
ERYTHROCYTE [DISTWIDTH] IN BLOOD BY AUTOMATED COUNT: 13.6 % (ref 12.3–15.4)
ERYTHROCYTE [DISTWIDTH] IN BLOOD BY AUTOMATED COUNT: 15.6 % (ref 11.5–14.5)
ERYTHROCYTE [DISTWIDTH] IN BLOOD BY AUTOMATED COUNT: 15.8 % (ref 11.5–14.5)
GFR SERPL CREATININE-BSD FRML MDRD: 30 ML/MIN/1.73
GFR SERPL CREATININE-BSD FRML MDRD: 39 ML/MIN/1.73
GLOBULIN UR ELPH-MCNC: 2.8 G/DL (ref 2.5–3.8)
GLOBULIN UR ELPH-MCNC: 2.9 G/DL (ref 2.5–3.8)
GLUCOSE BLD-MCNC: 105 MG/DL (ref 65–99)
GLUCOSE BLD-MCNC: 120 MG/DL (ref 65–99)
GLUCOSE SERPL-MCNC: 103 MG/DL (ref 65–99)
GLUCOSE SERPL-MCNC: 108 MG/DL (ref 65–99)
GLUCOSE SERPL-MCNC: 112 MG/DL (ref 65–99)
GLUCOSE SERPL-MCNC: 117 MG/DL (ref 65–99)
HCT VFR BLD AUTO: 39.3 % (ref 37.5–51)
HCT VFR BLD AUTO: 42.2 % (ref 40–54)
HCT VFR BLD AUTO: 43.8 % (ref 40–54)
HCT VFR BLD AUTO: 45.2 % (ref 37.5–51)
HDLC SERPL-MCNC: 48 MG/DL
HGB BLD-MCNC: 12.8 G/DL (ref 13–17.7)
HGB BLD-MCNC: 13.9 G/DL (ref 14–18)
HGB BLD-MCNC: 14.4 G/DL (ref 14–18)
HGB BLD-MCNC: 14.9 G/DL (ref 13–17.7)
INR PPP: 1.08 (ref 0.9–1.1)
LDLC/HDLC SERPL: 1.6 {RATIO}
LIPID INTERPRETATION: NORMAL
LYMPHOCYTES # BLD AUTO: 0.7 10*3/UL (ref 0.8–4.8)
LYMPHOCYTES # BLD AUTO: 0.8 10*3/UL (ref 0.8–4.8)
LYMPHOCYTES # BLD MANUAL: 0.9 10*3/MM3 (ref 0.7–3.1)
LYMPHOCYTES NFR BLD AUTO: 13 % (ref 18–42)
LYMPHOCYTES NFR BLD AUTO: 9 % (ref 18–42)
LYMPHOCYTES NFR BLD MANUAL: 13.1 % (ref 19.6–45.3)
LYMPHOCYTES NFR BLD MANUAL: 6.1 % (ref 5–12)
MACROCYTES BLD QL SMEAR: ABNORMAL
MAGNESIUM SERPL-MCNC: 2.4 MG/DL (ref 1.8–2.5)
MCH RBC QN AUTO: 34.7 PG (ref 26.6–33)
MCH RBC QN AUTO: 34.7 PG (ref 26–32)
MCH RBC QN AUTO: 34.9 PG (ref 26–32)
MCH RBC QN AUTO: 35.1 PG (ref 26.6–33)
MCHC RBC AUTO-ENTMCNC: 32.6 G/DL (ref 31.5–35.7)
MCHC RBC AUTO-ENTMCNC: 33 G/DL (ref 32–36)
MCHC RBC AUTO-ENTMCNC: 33 G/DL (ref 32–36)
MCHC RBC AUTO-ENTMCNC: 33.1 G/DL (ref 31.5–35.7)
MCV RBC AUTO: 105.3 FL (ref 80–94)
MCV RBC AUTO: 105.8 FL (ref 80–94)
MCV RBC AUTO: 106.1 FL (ref 79–97)
MCV RBC AUTO: 106.5 FL (ref 79–97)
MONOCYTES # BLD AUTO: 0.42 10*3/MM3 (ref 0.1–0.9)
MONOCYTES # BLD AUTO: 0.7 10*3/UL (ref 0.1–1.3)
MONOCYTES # BLD AUTO: 0.8 10*3/UL (ref 0.1–1.3)
MONOCYTES NFR BLD AUTO: 11 % (ref 2–11)
MONOCYTES NFR BLD AUTO: 9 % (ref 2–11)
MRSA SPEC QL CULT: NORMAL
NEUTROPHILS # BLD AUTO: 4.8 10*3/UL (ref 2.3–8.6)
NEUTROPHILS # BLD AUTO: 5.47 10*3/MM3 (ref 1.7–7)
NEUTROPHILS # BLD AUTO: 6.9 10*3/UL (ref 2.3–8.6)
NEUTROPHILS NFR BLD AUTO: 74 % (ref 50–75)
NEUTROPHILS NFR BLD AUTO: 80 % (ref 50–75)
NEUTROPHILS NFR BLD MANUAL: 79.8 % (ref 42.7–76)
NRBC BLD AUTO-RTO: 0 /100{WBCS}
NRBC BLD AUTO-RTO: 0 /100{WBCS}
NRBC/RBC NFR BLD MANUAL: 0 10*3/UL
NRBC/RBC NFR BLD MANUAL: 0 10*3/UL
PHOSPHATE SERPL-MCNC: 4 MG/DL (ref 2.4–4.7)
PHOSPHATE SERPL-MCNC: 4.1 MG/DL (ref 2.5–4.5)
PLAT MORPH BLD: NORMAL
PLATELET # BLD AUTO: 143 10*3/MM3 (ref 140–450)
PLATELET # BLD AUTO: 149 10*3/MM3 (ref 140–450)
PLATELET # BLD AUTO: 185 10*3/UL (ref 150–450)
PLATELET # BLD AUTO: 237 10*3/UL (ref 150–450)
PMV BLD AUTO: 10 FL (ref 6–12)
PMV BLD AUTO: 11.8 FL (ref 6–12)
PMV BLD AUTO: 8.5 FL (ref 7.4–10.4)
PMV BLD AUTO: 8.8 FL (ref 7.4–10.4)
POIKILOCYTOSIS BLD QL SMEAR: ABNORMAL
POTASSIUM BLD-SCNC: 4.8 MMOL/L (ref 3.6–5.1)
POTASSIUM BLD-SCNC: 4.9 MMOL/L (ref 3.5–5.2)
POTASSIUM SERPL-SCNC: 3.7 MMOL/L (ref 3.6–5.1)
POTASSIUM SERPL-SCNC: 3.8 MMOL/L (ref 3.6–5.1)
POTASSIUM SERPL-SCNC: 4.2 MMOL/L (ref 3.6–5.1)
POTASSIUM SERPL-SCNC: 5.7 MMOL/L (ref 3.6–5.1)
PROTHROMBIN TIME: 10.9 SECONDS (ref 9.6–11.7)
PSA FREE MFR SERPL: NORMAL %
PSA FREE SERPL-MCNC: 0.07 NG/ML
PSA SERPL-MCNC: 0.2 NG/ML
PTH-INTACT SERPL-MCNC: 111 PG/ML (ref 11–72)
PTH-INTACT SERPL-MCNC: 57.8 PG/ML (ref 15–65)
RBC # BLD AUTO: 3.69 10*6/MM3 (ref 4.14–5.8)
RBC # BLD AUTO: 3.99 10*6/UL (ref 4.6–6)
RBC # BLD AUTO: 4.16 10*6/UL (ref 4.6–6)
RBC # BLD AUTO: 4.26 10*6/MM3 (ref 4.14–5.8)
SODIUM BLD-SCNC: 140 MMOL/L (ref 136–144)
SODIUM BLD-SCNC: 145 MMOL/L (ref 136–145)
SODIUM SERPL-SCNC: 135 MMOL/L (ref 136–144)
SODIUM SERPL-SCNC: 137 MMOL/L (ref 136–144)
SODIUM SERPL-SCNC: 137 MMOL/L (ref 136–144)
SODIUM SERPL-SCNC: 140 MMOL/L (ref 136–144)
TRIGL SERPL-MCNC: 55 MG/DL
TSH SERPL-ACNC: 3.43 UIU/ML (ref 0.34–5.6)
VLDLC SERPL CALC-MCNC: 7.1 MG/DL
WBC # BLD AUTO: 6.5 10*3/UL (ref 4.5–11.5)
WBC # BLD AUTO: 8.6 10*3/UL (ref 4.5–11.5)
WBC MORPH BLD: NORMAL
WBC NRBC COR # BLD: 6.5 10*3/MM3 (ref 3.4–10.8)
WBC NRBC COR # BLD: 6.86 10*3/MM3 (ref 3.4–10.8)

## 2019-01-01 PROCEDURE — 25010000002 MIDAZOLAM PER 1 MG: Performed by: INTERNAL MEDICINE

## 2019-01-01 PROCEDURE — 25010000002 FENTANYL CITRATE (PF) 100 MCG/2ML SOLUTION: Performed by: INTERNAL MEDICINE

## 2019-01-01 PROCEDURE — 93005 ELECTROCARDIOGRAM TRACING: CPT | Performed by: INTERNAL MEDICINE

## 2019-01-01 PROCEDURE — 85610 PROTHROMBIN TIME: CPT

## 2019-01-01 PROCEDURE — 87081 CULTURE SCREEN ONLY: CPT

## 2019-01-01 PROCEDURE — 33262 RMVL& REPLC PULSE GEN 1 LEAD: CPT | Performed by: INTERNAL MEDICINE

## 2019-01-01 PROCEDURE — 93296 REM INTERROG EVL PM/IDS: CPT | Performed by: INTERNAL MEDICINE

## 2019-01-01 PROCEDURE — 82306 VITAMIN D 25 HYDROXY: CPT

## 2019-01-01 PROCEDURE — C1722 AICD, SINGLE CHAMBER: HCPCS | Performed by: INTERNAL MEDICINE

## 2019-01-01 PROCEDURE — 36415 COLL VENOUS BLD VENIPUNCTURE: CPT

## 2019-01-01 PROCEDURE — 80048 BASIC METABOLIC PNL TOTAL CA: CPT

## 2019-01-01 PROCEDURE — 83970 ASSAY OF PARATHORMONE: CPT

## 2019-01-01 PROCEDURE — 85025 COMPLETE CBC W/AUTO DIFF WBC: CPT

## 2019-01-01 PROCEDURE — 99153 MOD SED SAME PHYS/QHP EA: CPT | Performed by: INTERNAL MEDICINE

## 2019-01-01 PROCEDURE — 99152 MOD SED SAME PHYS/QHP 5/>YRS: CPT | Performed by: INTERNAL MEDICINE

## 2019-01-01 PROCEDURE — 93282 PRGRMG EVAL IMPLANTABLE DFB: CPT | Performed by: INTERNAL MEDICINE

## 2019-01-01 PROCEDURE — 85007 BL SMEAR W/DIFF WBC COUNT: CPT

## 2019-01-01 PROCEDURE — 93010 ELECTROCARDIOGRAM REPORT: CPT | Performed by: INTERNAL MEDICINE

## 2019-01-01 PROCEDURE — 85027 COMPLETE CBC AUTOMATED: CPT

## 2019-01-01 PROCEDURE — 84100 ASSAY OF PHOSPHORUS: CPT

## 2019-01-01 PROCEDURE — 93295 DEV INTERROG REMOTE 1/2/MLT: CPT | Performed by: INTERNAL MEDICINE

## 2019-01-01 PROCEDURE — 93000 ELECTROCARDIOGRAM COMPLETE: CPT | Performed by: INTERNAL MEDICINE

## 2019-01-01 PROCEDURE — 99215 OFFICE O/P EST HI 40 MIN: CPT | Performed by: INTERNAL MEDICINE

## 2019-01-01 PROCEDURE — 99024 POSTOP FOLLOW-UP VISIT: CPT | Performed by: INTERNAL MEDICINE

## 2019-01-01 DEVICE — ICD VISIA AF VR SURESCAN DF1 DVFB1D1: Type: IMPLANTABLE DEVICE | Site: CHEST | Status: FUNCTIONAL

## 2019-01-01 RX ORDER — CARVEDILOL 12.5 MG/1
TABLET ORAL
Qty: 180 TABLET | Refills: 1 | Status: SHIPPED | OUTPATIENT
Start: 2019-01-01 | End: 2020-01-01

## 2019-01-01 RX ORDER — CARVEDILOL 12.5 MG/1
TABLET ORAL
COMMUNITY
Start: 2018-01-05 | End: 2019-01-01 | Stop reason: SDUPTHER

## 2019-01-01 RX ORDER — ATORVASTATIN CALCIUM 10 MG/1
TABLET, FILM COATED ORAL
Qty: 90 TABLET | Refills: 2 | Status: SHIPPED | OUTPATIENT
Start: 2019-01-01 | End: 2020-01-01

## 2019-01-01 RX ORDER — FENTANYL CITRATE 50 UG/ML
INJECTION, SOLUTION INTRAMUSCULAR; INTRAVENOUS AS NEEDED
Status: DISCONTINUED | OUTPATIENT
Start: 2019-01-01 | End: 2019-01-01 | Stop reason: HOSPADM

## 2019-01-01 RX ORDER — SPIRONOLACTONE 25 MG/1
25 TABLET ORAL DAILY
COMMUNITY
Start: 2019-01-01 | End: 2020-01-01 | Stop reason: HOSPADM

## 2019-01-01 RX ORDER — RANITIDINE 150 MG/1
TABLET ORAL
Qty: 60 TABLET | Refills: 3 | Status: SHIPPED | OUTPATIENT
Start: 2019-01-01 | End: 2020-01-01

## 2019-01-01 RX ORDER — ALBUTEROL SULFATE 90 UG/1
1 AEROSOL, METERED RESPIRATORY (INHALATION) EVERY 6 HOURS PRN
COMMUNITY
Start: 2019-01-11

## 2019-01-01 RX ORDER — SODIUM CHLORIDE 0.9 % (FLUSH) 0.9 %
3 SYRINGE (ML) INJECTION EVERY 12 HOURS SCHEDULED
Status: DISCONTINUED | OUTPATIENT
Start: 2019-01-01 | End: 2019-01-01 | Stop reason: HOSPADM

## 2019-01-01 RX ORDER — CLOPIDOGREL BISULFATE 75 MG/1
TABLET ORAL
Qty: 90 TABLET | Refills: 0 | Status: SHIPPED | OUTPATIENT
Start: 2019-01-01 | End: 2020-01-01

## 2019-01-01 RX ORDER — FERROUS SULFATE 325(65) MG
325 TABLET ORAL
COMMUNITY
Start: 2019-01-01

## 2019-01-01 RX ORDER — ASPIRIN 81 MG/1
81 TABLET ORAL DAILY
COMMUNITY
Start: 2014-11-17

## 2019-01-01 RX ORDER — SODIUM CHLORIDE 0.9 % (FLUSH) 0.9 %
10 SYRINGE (ML) INJECTION AS NEEDED
Status: DISCONTINUED | OUTPATIENT
Start: 2019-01-01 | End: 2019-01-01 | Stop reason: HOSPADM

## 2019-01-01 RX ORDER — ERGOCALCIFEROL 1.25 MG/1
50000 CAPSULE ORAL
COMMUNITY
Start: 2019-01-01

## 2019-01-01 RX ORDER — MIDAZOLAM HYDROCHLORIDE 1 MG/ML
INJECTION INTRAMUSCULAR; INTRAVENOUS AS NEEDED
Status: DISCONTINUED | OUTPATIENT
Start: 2019-01-01 | End: 2019-01-01 | Stop reason: HOSPADM

## 2019-01-01 RX ORDER — FUROSEMIDE 40 MG/1
TABLET ORAL
Qty: 180 TABLET | Refills: 0 | Status: SHIPPED | OUTPATIENT
Start: 2019-01-01 | End: 2020-01-01

## 2019-01-01 RX ORDER — CLOPIDOGREL BISULFATE 75 MG/1
TABLET ORAL EVERY 24 HOURS
COMMUNITY
Start: 2018-10-31 | End: 2019-01-01

## 2019-01-01 RX ORDER — AMIODARONE HYDROCHLORIDE 200 MG/1
TABLET ORAL
Qty: 90 TABLET | Refills: 2 | Status: SHIPPED | OUTPATIENT
Start: 2019-01-01 | End: 2020-01-01

## 2019-01-01 RX ORDER — SODIUM CHLORIDE 9 MG/ML
30 INJECTION, SOLUTION INTRAVENOUS CONTINUOUS
Status: DISCONTINUED | OUTPATIENT
Start: 2019-01-01 | End: 2019-01-01 | Stop reason: HOSPADM

## 2019-01-01 RX ORDER — FUROSEMIDE 40 MG/1
40 TABLET ORAL 3 TIMES WEEKLY
COMMUNITY
Start: 2018-01-05 | End: 2019-01-01

## 2019-01-01 RX ADMIN — SODIUM CHLORIDE 30 ML/HR: 900 INJECTION, SOLUTION INTRAVENOUS at 08:47

## 2019-01-01 RX ADMIN — Medication 1 G: at 10:47

## 2019-09-03 PROBLEM — I25.5 ISCHEMIC CARDIOMYOPATHY: Status: ACTIVE | Noted: 2019-01-01

## 2019-09-03 PROBLEM — Z95.810 ICD (IMPLANTABLE CARDIOVERTER-DEFIBRILLATOR), DUAL, IN SITU: Status: ACTIVE | Noted: 2019-01-01

## 2019-09-03 PROBLEM — Z95.810 PRESENCE OF AUTOMATIC CARDIOVERTER/DEFIBRILLATOR (AICD): Status: ACTIVE | Noted: 2019-01-01

## 2019-09-03 NOTE — PROGRESS NOTES
Encounter Date:09/03/2019  Last seen 5/2/2019      Patient ID: Luis E Rahman is a 79 y.o. male.    Chief Complaint:  ICD LAYNE-new problem  Ischemic cardiomyopathy  Status post CABG  Status post ICD      History of Present Illness  Since I have last seen, the patient has been without any chest discomfort ,shortness of breath, palpitations, dizziness or syncope.  Denies having any headache ,abdominal pain ,nausea, vomiting , diarrhea constipation, loss of weight or loss of appetite.  Denies having any excessive bruising ,hematuria or blood in the stool.    Denies having any ICD shocks.    Review of all systems negative except as indicated      Assessment and Plan       ////    Impression  ===   - ischemic cardiomyopathy.      - status post single-chamber ICD 03/05/2010.  Have repositioning of the ventricular lead 07/09/2010.  Status post ICD shocks for ventricular tachycardia.  Patient is doing better with amiodarone and diuresis.  VT zone was reduced to 142 beats per minute    - status post successful ICD shock for ventricular tachycardia 09/24/2018     -history of ICD shock with successful defibrillation.  04/07/2017. Status post ICD shock  x2 for sustained ventricular tachycardia 02/05/2018.  Unsuccessful at 25 joules followed by successful 35 joules shock.Patient was asymptomatic.    ICD has reached LAYNE 7/17/2019    Echocardiogram showed left ventricular dysfunction with ejection fraction of 40%.  Left atrial enlargement is present.  10/10/2018.  Michelle scan Cardiolite test is abnormal with inferior infarction and significant poster lateral ischemia.     -status post CABG 01/2000.    Status post stent to SVG to RCA 10/17/2018   cardiac catheterization 10/17/2018.  LV-gram not performed due to renal dysfunction.  Total LAD and RCA. .  Marginal branch has 30% disease.  Villarreal is atretic.  SVG to marginal branch is patent.  SVG to diagonal branch is patent and is filling LAD also.  SVG to PDA had 95% disease just distal  to the landing site and had intervention.      - dizziness  Probably due to low blood pressure.  -improved with reduce dose of lisinopril.    - compensated congestive heart failure    - history of atrial fibrillation.  Patient has converted and is maintaining sinus rhythm    -status post left iliofemoral thrombectomy and endarterectomy 04/01/2017.  Status post stent to right common iliac artery and left external iliac artery 04/04/2017 P    -dyslipidemia hypertension and history of smoking.  Renal dysfunctionBUN 17 creatinine 1.9    -status post hemorrhoidectomy and hernia repair    -status post impending inferior myocardial infarction prior to RCA stent placement 1997.  ==  Plan  ========  ICD pulse generator has reached LAYNE.  Patient did not have any ICD shocks.  Patient is not having any angina pectoris or congestive heart failure.  Medications were reviewed and updated.  Patient to have ICD pulse generator replacement ([employer])  Risks and benefits pros and cons of procedure were discussed with patient.  Further plan will depend on patient's progress.  [[[[[[[[[[[[[             Diagnosis Plan   1. ICD (implantable cardioverter-defibrillator), dual, in situ  Case Request Cath Lab: ICD DC generator change    CBC (No Diff)    Basic Metabolic Panel    Protime-INR    ECG 12 Lead    MRSA Screen Culture - Swab, Nares   2. Ischemic cardiomyopathy  Case Request Cath Lab: ICD DC generator change    CBC (No Diff)    Basic Metabolic Panel    Protime-INR    ECG 12 Lead    MRSA Screen Culture - Swab, Nares   3. Hx of CABG     LAB RESULTS (LAST 7 DAYS)    CBC  Results from last 7 days   Lab Units 09/03/19  1542   WBC 10*3/mm3 6.50   RBC 10*6/mm3 4.26   HEMOGLOBIN g/dL 14.9   HEMATOCRIT % 45.2   MCV fL 106.1*   PLATELETS 10*3/mm3 143       BMP  Results from last 7 days   Lab Units 09/03/19  1542   SODIUM mmol/L 140   POTASSIUM mmol/L 4.8   CHLORIDE mmol/L 105   CO2 mmol/L 25.0   BUN mg/dL 23*   CREATININE mg/dL 2.10*   GLUCOSE  mg/dL 105*       CMP   Results from last 7 days   Lab Units 09/03/19  1542   SODIUM mmol/L 140   POTASSIUM mmol/L 4.8   CHLORIDE mmol/L 105   CO2 mmol/L 25.0   BUN mg/dL 23*   CREATININE mg/dL 2.10*   GLUCOSE mg/dL 105*         BNP        TROPONIN        CoAg  Results from last 7 days   Lab Units 09/03/19  1542   INR  1.08       Creatinine Clearance  Estimated Creatinine Clearance: 27 mL/min (A) (by C-G formula based on SCr of 2.1 mg/dL (H)).    ABG        Radiology  No radiology results for the last day                The following portions of the patient's history were reviewed and updated as appropriate: allergies, current medications, past family history, past medical history, past social history, past surgical history and problem list.    Review of Systems   Constitution: Negative for malaise/fatigue.   Cardiovascular: Positive for leg swelling (left ). Negative for chest pain, palpitations and syncope.   Respiratory: Positive for shortness of breath.    Skin: Negative for rash.   Gastrointestinal: Negative for nausea and vomiting.   Neurological: Negative for dizziness, light-headedness and numbness.         Current Outpatient Medications:   •  albuterol sulfate HFA (VENTOLIN HFA) 108 (90 Base) MCG/ACT inhaler, VENTOLIN  (90 Base) MCG/ACT AERS, Disp: , Rfl:   •  aspirin (ASPIR-LOW) 81 MG EC tablet, ASPIR-LOW 81 MG TBEC, Disp: , Rfl:   •  B Complex Vitamins (VITAMIN B COMPLEX PO), VITAMIN B COMPLEX TABS, Disp: , Rfl:   •  carvedilol (COREG) 12.5 MG tablet, COREG 12.5 MG TABS, Disp: , Rfl:   •  clopidogrel (PLAVIX) 75 MG tablet, Daily., Disp: , Rfl:   •  furosemide (LASIX) 40 MG tablet, Daily., Disp: , Rfl:   •  spironolactone (ALDACTONE) 25 MG tablet, Daily., Disp: , Rfl:   •  amiodarone (PACERONE) 200 MG tablet, TAKE 1 TABLET BY MOUTH EVERY DAY, Disp: 90 tablet, Rfl: 2  •  atorvastatin (LIPITOR) 10 MG tablet, TAKE ONE TABLET BY MOUTH AT BEDTIME, Disp: 90 tablet, Rfl: 2  •  FEROSUL 325 (65 Fe) MG tablet,  , Disp: , Rfl:   •  raNITIdine (ZANTAC) 150 MG tablet, TAKE 1 TABLET BY MOUTH 2 TIMES EVERY DAY **DISCONTINUE NEXIUM, Disp: 60 tablet, Rfl: 3  •  vitamin D (ERGOCALCIFEROL) 38815 units capsule capsule, , Disp: , Rfl:     No Known Allergies    History reviewed. No pertinent family history.    Past Surgical History:   Procedure Laterality Date   • CARDIAC CATHETERIZATION     • CORONARY ANGIOPLASTY WITH STENT PLACEMENT     • CORONARY ARTERY BYPASS GRAFT     • PACEMAKER REPLACEMENT         Past Medical History:   Diagnosis Date   • Atrial fibrillation (CMS/HCC)    • CHF (congestive heart failure) (CMS/HCC)    • Hyperlipidemia    • Hypertension    • Myocardial infarction (CMS/HCC)        History reviewed. No pertinent family history.    Social History     Socioeconomic History   • Marital status: Single     Spouse name: Not on file   • Number of children: Not on file   • Years of education: Not on file   • Highest education level: Not on file   Tobacco Use   • Smoking status: Current Every Day Smoker   • Smokeless tobacco: Never Used   Substance and Sexual Activity   • Alcohol use: No     Frequency: Never         Procedures      Objective:       Physical Exam    /71 (BP Location: Left arm, Patient Position: Sitting, Cuff Size: Adult)   Pulse 119   Wt 69.3 kg (152 lb 12 oz)   SpO2 99%   BMI 21.92 kg/m²   The patient is alert, oriented and in no distress.    Vital signs as noted above.    Head and neck revealed no carotid bruits or jugular venous distension.  No thyromegaly or lymphadenopathy is present.    Lungs clear.  No wheezing.  Breath sounds are normal bilaterally.    Heart normal first and second heart sounds.  No murmur..  No pericardial rub is present.  No gallop is present.    Abdomen soft and nontender.  No organomegaly is present.    Extremities revealed good peripheral pulses without any pedal edema.    Skin warm and dry.    Musculoskeletal system is grossly normal.    CNS grossly normal.

## 2019-09-19 PROBLEM — I50.9 CONGESTIVE HEART FAILURE (HCC): Status: ACTIVE | Noted: 2019-01-01

## 2019-09-19 NOTE — PROGRESS NOTES
Encounter Date:09/19/2019  Last seen 9/6/2019.  Status post ICD pulse generator replacement      Patient ID: Luis E Rahman is a 79 y.o. male.    Chief Complaint:  Status post ICD  Status post pulse generator replacement      History of Present Illness  Patient recently had ICD pulse generator replacement.  Since I have last seen, the patient has been without any chest discomfort ,shortness of breath, palpitations, dizziness or syncope.  Denies having any headache ,abdominal pain ,nausea, vomiting , diarrhea constipation, loss of weight or loss of appetite.  Denies having any excessive bruising ,hematuria or blood in the stool.    Review of all systems negative except as indicated    Assessment and Plan       ////    Impression  ===   - ischemic cardiomyopathy.       - status post single-chamber ICD 03/05/2010.  Have repositioning of the ventricular lead 07/09/2010.  Status post ICD shocks for ventricular tachycardia.  Patient is doing better with amiodarone and diuresis.  VT zone was reduced to 142 beats per minute    Status post ICD pulse generator replacement 9/6/2019.  Revision of pocket and repositioning of the pulse generator more medially and superiorly.      - status post successful ICD shock for ventricular tachycardia 09/24/2018      -history of ICD shock with successful defibrillation.  04/07/2017. Status post ICD shock  x2 for sustained ventricular tachycardia 02/05/2018.  Unsuccessful at 25 joules followed by successful 35 joules shock.Patient was asymptomatic.     Echocardiogram showed left ventricular dysfunction with ejection fraction of 40%.  Left atrial enlargement is present.  10/10/2018.  Michelle scan Cardiolite test is abnormal with inferior infarction and significant poster lateral ischemia.      -status post CABG 01/2000.    Status post stent to SVG to RCA 10/17/2018   cardiac catheterization 10/17/2018.  LV-gram not performed due to renal dysfunction.  Total LAD and RCA. .  Marginal branch has  30% disease.  Villarreal is atretic.  SVG to marginal branch is patent.  SVG to diagonal branch is patent and is filling LAD also.  SVG to PDA had 95% disease just distal to the landing site and had intervention.      - dizziness  Probably due to low blood pressure.  -improved with reduce dose of lisinopril.     - compensated congestive heart failure     - history of atrial fibrillation.  Patient has converted and was maintaining sinus rhythm.  Patient is in atrial fibrillation     -status post left iliofemoral thrombectomy and endarterectomy 04/01/2017.  Status post stent to right common iliac artery and left external iliac artery 04/04/2017 P     -dyslipidemia hypertension and history of smoking.  Renal dysfunctionBUN 17 creatinine 1.9     -status post hemorrhoidectomy and hernia repair     -status post impending inferior myocardial infarction prior to RCA stent placement 1997.  ==  Plan  ========  Recent ICD pulse generator replacement.  EKG showed atrial fibrillation  Patient did not have any ICD shocks.  Patient is not having any angina pectoris or congestive heart failure.  Medications were reviewed and updated.  Patient is on Plavix.  Follow-up in office in 3 months with EKG.  The patient has persistent atrial fibrillation consideration would be given for starting anticoagulation with Coumadin or newer anticoagulants.  However if risk of fall bleeding is probably significantly more at this time.  Further plan will depend on patient's progress.  [[[[[[[[[[[[[           Diagnosis Plan   1. Presence of automatic cardioverter/defibrillator (AICD)     2. Ischemic cardiomyopathy     LAB RESULTS (LAST 7 DAYS)    CBC        BMP        CMP         BNP        TROPONIN        CoAg        Creatinine Clearance  Estimated Creatinine Clearance: 26.2 mL/min (A) (by C-G formula based on SCr of 2.1 mg/dL (H)).    ABG        Radiology  No radiology results for the last day                The following portions of the patient's  history were reviewed and updated as appropriate: allergies, current medications, past family history, past medical history, past social history, past surgical history and problem list.    Review of Systems   Constitution: Negative for malaise/fatigue.   Cardiovascular: Positive for leg swelling (ankles). Negative for chest pain, palpitations and syncope.   Respiratory: Negative for shortness of breath.    Skin: Negative for rash.   Gastrointestinal: Negative for nausea and vomiting.   Neurological: Negative for dizziness, light-headedness and numbness.         Current Outpatient Medications:   •  albuterol sulfate HFA (VENTOLIN HFA) 108 (90 Base) MCG/ACT inhaler, VENTOLIN  (90 Base) MCG/ACT AERS, Disp: , Rfl:   •  amiodarone (PACERONE) 200 MG tablet, TAKE 1 TABLET BY MOUTH EVERY DAY, Disp: 90 tablet, Rfl: 2  •  aspirin (ASPIR-LOW) 81 MG EC tablet, ASPIR-LOW 81 MG TBEC, Disp: , Rfl:   •  atorvastatin (LIPITOR) 10 MG tablet, TAKE ONE TABLET BY MOUTH AT BEDTIME, Disp: 90 tablet, Rfl: 2  •  B Complex Vitamins (VITAMIN B COMPLEX PO), VITAMIN B COMPLEX TABS, Disp: , Rfl:   •  carvedilol (COREG) 12.5 MG tablet, COREG 12.5 MG TABS, Disp: , Rfl:   •  clopidogrel (PLAVIX) 75 MG tablet, Daily., Disp: , Rfl:   •  FEROSUL 325 (65 Fe) MG tablet, , Disp: , Rfl:   •  furosemide (LASIX) 40 MG tablet, Take 40 mg by mouth 3 (Three) Times a Week., Disp: , Rfl:   •  raNITIdine (ZANTAC) 150 MG tablet, TAKE 1 TABLET BY MOUTH 2 TIMES EVERY DAY **DISCONTINUE NEXIUM, Disp: 60 tablet, Rfl: 3  •  spironolactone (ALDACTONE) 25 MG tablet, Daily., Disp: , Rfl:   •  vitamin D (ERGOCALCIFEROL) 80382 units capsule capsule, , Disp: , Rfl:     No Known Allergies    History reviewed. No pertinent family history.    Past Surgical History:   Procedure Laterality Date   • CARDIAC CATHETERIZATION     • CARDIAC ELECTROPHYSIOLOGY PROCEDURE N/A 9/6/2019    Procedure: ICD DC generator change;  Surgeon: Amalia Leal MD;  Location: [facility]  INVASIVE LOCATION;  Service: Cardiovascular   • CARDIAC ELECTROPHYSIOLOGY PROCEDURE N/A 9/6/2019    Procedure: Pocket Revision;  Surgeon: Amalia Leal MD;  Location: [facility] CATH INVASIVE LOCATION;  Service: Cardiovascular   • CORONARY ANGIOPLASTY WITH STENT PLACEMENT     • CORONARY ARTERY BYPASS GRAFT     • PACEMAKER REPLACEMENT         Past Medical History:   Diagnosis Date   • Atrial fibrillation (CMS/HCC)    • CHF (congestive heart failure) (CMS/HCC)    • Hyperlipidemia    • Hypertension    • Myocardial infarction (CMS/HCC)        History reviewed. No pertinent family history.    Social History     Socioeconomic History   • Marital status: Single     Spouse name: Not on file   • Number of children: Not on file   • Years of education: Not on file   • Highest education level: Not on file   Tobacco Use   • Smoking status: Current Every Day Smoker     Packs/day: 0.50     Types: Cigarettes   • Smokeless tobacco: Never Used   Substance and Sexual Activity   • Alcohol use: No     Frequency: Never           ECG 12 Lead  Date/Time: 9/19/2019 1:10 PM  Performed by: Amalia Leal MD  Authorized by: Amalia Leal MD   Comparison: compared with previous ECG   Comments: Atrial fibrillation with controlled ventricular response 94/min nonspecific ST-T wave changes normal axis no ectopy.  No change from 9/3/2019              Objective:       Physical Exam    BP 91/64 (BP Location: Left arm, Patient Position: Sitting, Cuff Size: Large Adult)   Pulse 102   Wt 67.1 kg (148 lb)   SpO2 100%   BMI 24.63 kg/m²   The patient is alert, oriented and in no distress.    Vital signs as noted above.    Head and neck revealed no carotid bruits or jugular venous distension.  No thyromegaly or lymphadenopathy is present.    Lungs clear.  No wheezing.  Breath sounds are normal bilaterally.    Heart normal first and second heart sounds.  No murmur..  No pericardial rub is present.  No gallop is present.    Abdomen soft and  nontender.  No organomegaly is present.    Extremities revealed good peripheral pulses without any pedal edema.    Skin warm and dry.  ICD site looks normal.    Musculoskeletal system is grossly normal.    CNS grossly normal.

## 2019-12-16 NOTE — TELEPHONE ENCOUNTER
Spoke to Aurea, patient takes Lasix 40 twice daily on Monday, Wedensday and Friday and 1 daily the other days a week. Advised her to have patient take extra Lasix today, than increase to twice daily until he sees Dr West on Friday.

## 2019-12-16 NOTE — TELEPHONE ENCOUNTER
Pm transmission sent around [facility], asking for results, transmission sent 12/15 as well.  States he has soa and feet swelling   Aurea  [phone]

## 2020-01-01 ENCOUNTER — APPOINTMENT (OUTPATIENT)
Dept: CARDIOLOGY | Facility: HOSPITAL | Age: 80
End: 2020-01-01

## 2020-01-01 ENCOUNTER — APPOINTMENT (OUTPATIENT)
Dept: GENERAL RADIOLOGY | Facility: HOSPITAL | Age: 80
End: 2020-01-01

## 2020-01-01 ENCOUNTER — READMISSION MANAGEMENT (OUTPATIENT)
Dept: CALL CENTER | Facility: HOSPITAL | Age: 80
End: 2020-01-01

## 2020-01-01 ENCOUNTER — APPOINTMENT (OUTPATIENT)
Dept: ULTRASOUND IMAGING | Facility: HOSPITAL | Age: 80
End: 2020-01-01

## 2020-01-01 ENCOUNTER — CLINICAL SUPPORT NO REQUIREMENTS (OUTPATIENT)
Dept: CARDIOLOGY | Facility: CLINIC | Age: 80
End: 2020-01-01

## 2020-01-01 ENCOUNTER — HOSPITAL ENCOUNTER (OUTPATIENT)
Facility: HOSPITAL | Age: 80
Setting detail: OBSERVATION
Discharge: HOME | End: 2020-01-25
Attending: EMERGENCY MEDICINE | Admitting: FAMILY MEDICINE

## 2020-01-01 ENCOUNTER — HOSPITAL ENCOUNTER (INPATIENT)
Facility: HOSPITAL | Age: 80
LOS: 1 days | End: 2020-02-03
Attending: FAMILY MEDICINE | Admitting: INTERNAL MEDICINE

## 2020-01-01 ENCOUNTER — LAB (OUTPATIENT)
Dept: LAB | Facility: HOSPITAL | Age: 80
End: 2020-01-01

## 2020-01-01 ENCOUNTER — TRANSCRIBE ORDERS (OUTPATIENT)
Dept: ADMINISTRATIVE | Facility: HOSPITAL | Age: 80
End: 2020-01-01

## 2020-01-01 ENCOUNTER — OFFICE VISIT (OUTPATIENT)
Dept: CARDIOLOGY | Facility: CLINIC | Age: 80
End: 2020-01-01

## 2020-01-01 VITALS
HEIGHT: 71 IN | RESPIRATION RATE: 20 BRPM | HEART RATE: 95 BPM | SYSTOLIC BLOOD PRESSURE: 91 MMHG | BODY MASS INDEX: 23.86 KG/M2 | WEIGHT: 170.42 LBS | TEMPERATURE: 98.1 F | OXYGEN SATURATION: 87 % | DIASTOLIC BLOOD PRESSURE: 44 MMHG

## 2020-01-01 VITALS
DIASTOLIC BLOOD PRESSURE: 76 MMHG | WEIGHT: 167.75 LBS | HEART RATE: 100 BPM | OXYGEN SATURATION: 99 % | SYSTOLIC BLOOD PRESSURE: 114 MMHG | BODY MASS INDEX: 27.95 KG/M2 | HEIGHT: 65 IN

## 2020-01-01 VITALS
OXYGEN SATURATION: 98 % | TEMPERATURE: 97.2 F | SYSTOLIC BLOOD PRESSURE: 102 MMHG | BODY MASS INDEX: 23.06 KG/M2 | HEART RATE: 98 BPM | HEIGHT: 71 IN | WEIGHT: 164.68 LBS | RESPIRATION RATE: 17 BRPM | DIASTOLIC BLOOD PRESSURE: 77 MMHG

## 2020-01-01 DIAGNOSIS — I48.19 PERSISTENT ATRIAL FIBRILLATION (HCC): ICD-10-CM

## 2020-01-01 DIAGNOSIS — Z95.820 STATUS POST ANGIOPLASTY WITH STENT: ICD-10-CM

## 2020-01-01 DIAGNOSIS — R06.00 DYSPNEA, UNSPECIFIED TYPE: ICD-10-CM

## 2020-01-01 DIAGNOSIS — R41.89 EPISODE OF UNRESPONSIVENESS: ICD-10-CM

## 2020-01-01 DIAGNOSIS — Z95.810 PRESENCE OF AUTOMATIC CARDIOVERTER/DEFIBRILLATOR (AICD): Primary | ICD-10-CM

## 2020-01-01 DIAGNOSIS — R07.9 CHEST PAIN, UNSPECIFIED TYPE: Primary | ICD-10-CM

## 2020-01-01 DIAGNOSIS — N18.30 CHRONIC KIDNEY DISEASE, STAGE III (MODERATE) (HCC): Primary | ICD-10-CM

## 2020-01-01 DIAGNOSIS — N17.9 ACUTE RENAL FAILURE, UNSPECIFIED ACUTE RENAL FAILURE TYPE (HCC): ICD-10-CM

## 2020-01-01 DIAGNOSIS — R77.8 ELEVATED TROPONIN: ICD-10-CM

## 2020-01-01 DIAGNOSIS — I50.20 SYSTOLIC CONGESTIVE HEART FAILURE, UNSPECIFIED HF CHRONICITY (HCC): ICD-10-CM

## 2020-01-01 DIAGNOSIS — N18.9 ACUTE KIDNEY INJURY SUPERIMPOSED ON CHRONIC KIDNEY DISEASE (HCC): ICD-10-CM

## 2020-01-01 DIAGNOSIS — I50.9 ACUTE ON CHRONIC CONGESTIVE HEART FAILURE, UNSPECIFIED HEART FAILURE TYPE (HCC): ICD-10-CM

## 2020-01-01 DIAGNOSIS — J96.01 ACUTE RESPIRATORY FAILURE WITH HYPOXIA (HCC): ICD-10-CM

## 2020-01-01 DIAGNOSIS — N17.9 ACUTE KIDNEY INJURY SUPERIMPOSED ON CHRONIC KIDNEY DISEASE (HCC): ICD-10-CM

## 2020-01-01 DIAGNOSIS — N18.30 CHRONIC KIDNEY DISEASE, STAGE III (MODERATE) (HCC): ICD-10-CM

## 2020-01-01 DIAGNOSIS — I25.5 ISCHEMIC CARDIOMYOPATHY: ICD-10-CM

## 2020-01-01 DIAGNOSIS — I25.5 ISCHEMIC CARDIOMYOPATHY: Primary | ICD-10-CM

## 2020-01-01 DIAGNOSIS — Z95.1 HX OF CABG: ICD-10-CM

## 2020-01-01 DIAGNOSIS — Z95.810 PRESENCE OF AUTOMATIC CARDIOVERTER/DEFIBRILLATOR (AICD): ICD-10-CM

## 2020-01-01 LAB
ALBUMIN SERPL-MCNC: 2.8 G/DL (ref 3.5–5.2)
ALBUMIN SERPL-MCNC: 3.2 G/DL (ref 3.5–5.2)
ALBUMIN SERPL-MCNC: 3.3 G/DL (ref 3.5–5.2)
ALBUMIN SERPL-MCNC: 3.3 G/DL (ref 3.5–5.2)
ALBUMIN SERPL-MCNC: 4 G/DL (ref 3.5–5.2)
ALBUMIN/GLOB SERPL: 1.3 G/DL
ALBUMIN/GLOB SERPL: 1.3 G/DL
ALBUMIN/GLOB SERPL: 1.4 G/DL
ALP SERPL-CCNC: 113 U/L (ref 39–117)
ALP SERPL-CCNC: 124 U/L (ref 39–117)
ALP SERPL-CCNC: 133 U/L (ref 39–117)
ALP SERPL-CCNC: 84 U/L (ref 39–117)
ALT SERPL W P-5'-P-CCNC: 103 U/L (ref 1–41)
ALT SERPL W P-5'-P-CCNC: 166 U/L (ref 1–41)
ALT SERPL W P-5'-P-CCNC: 25 U/L (ref 1–41)
ALT SERPL W P-5'-P-CCNC: 49 U/L (ref 1–41)
ANION GAP SERPL CALCULATED.3IONS-SCNC: 14 MMOL/L (ref 5–15)
ANION GAP SERPL CALCULATED.3IONS-SCNC: 15 MMOL/L (ref 5–15)
ANION GAP SERPL CALCULATED.3IONS-SCNC: 16 MMOL/L (ref 5–15)
ANION GAP SERPL CALCULATED.3IONS-SCNC: 18 MMOL/L (ref 5–15)
ANION GAP SERPL CALCULATED.3IONS-SCNC: 19 MMOL/L (ref 5–15)
ANION GAP SERPL CALCULATED.3IONS-SCNC: 19 MMOL/L (ref 5–15)
ANION GAP SERPL CALCULATED.3IONS-SCNC: 20 MMOL/L (ref 5–15)
ANION GAP SERPL CALCULATED.3IONS-SCNC: 20.5 MMOL/L (ref 5–15)
ANION GAP SERPL CALCULATED.3IONS-SCNC: 23 MMOL/L (ref 5–15)
ANISOCYTOSIS BLD QL: ABNORMAL
ANISOCYTOSIS BLD QL: ABNORMAL
APTT PPP: 22.4 SECONDS (ref 24–31)
APTT PPP: 23.3 SECONDS (ref 61–76.5)
APTT PPP: 56.7 SECONDS (ref 61–76.5)
APTT PPP: 59.1 SECONDS (ref 61–76.5)
APTT PPP: 59.6 SECONDS (ref 61–76.5)
APTT PPP: 62.3 SECONDS (ref 61–76.5)
APTT PPP: 73.3 SECONDS (ref 61–76.5)
APTT PPP: 74.6 SECONDS (ref 61–76.5)
APTT PPP: 76.7 SECONDS (ref 61–76.5)
APTT PPP: 92.5 SECONDS (ref 61–76.5)
ARTERIAL PATENCY WRIST A: ABNORMAL
AST SERPL-CCNC: 103 U/L (ref 1–40)
AST SERPL-CCNC: 174 U/L (ref 1–40)
AST SERPL-CCNC: 21 U/L (ref 1–40)
AST SERPL-CCNC: 51 U/L (ref 1–40)
ATMOSPHERIC PRESS: ABNORMAL MM[HG]
B PERT DNA SPEC QL NAA+PROBE: NOT DETECTED
BACTERIA SPEC AEROBE CULT: NORMAL
BACTERIA SPEC AEROBE CULT: NORMAL
BASE EXCESS BLDA CALC-SCNC: -0.7 MMOL/L (ref 0–3)
BASE EXCESS BLDA CALC-SCNC: -1.6 MMOL/L (ref 0–3)
BASE EXCESS BLDA CALC-SCNC: -3.3 MMOL/L (ref 0–3)
BASE EXCESS BLDA CALC-SCNC: -5.1 MMOL/L (ref 0–3)
BASE EXCESS BLDA CALC-SCNC: -5.5 MMOL/L (ref 0–3)
BASE EXCESS BLDA CALC-SCNC: -9.8 MMOL/L (ref 0–3)
BASOPHILS # BLD AUTO: 0 10*3/MM3 (ref 0–0.2)
BASOPHILS # BLD AUTO: 0 10*3/MM3 (ref 0–0.2)
BASOPHILS # BLD AUTO: 0.1 10*3/MM3 (ref 0–0.2)
BASOPHILS # BLD MANUAL: 0.12 10*3/MM3 (ref 0–0.2)
BASOPHILS NFR BLD AUTO: 0.2 % (ref 0–1.5)
BASOPHILS NFR BLD AUTO: 0.4 % (ref 0–1.5)
BASOPHILS NFR BLD AUTO: 0.7 % (ref 0–1.5)
BASOPHILS NFR BLD AUTO: 1 % (ref 0–1.5)
BDY SITE: ABNORMAL
BH CV ECHO MEAS - % IVS THICK: 5.3 %
BH CV ECHO MEAS - % LVPW THICK: 12.6 %
BH CV ECHO MEAS - ACS: 2.5 CM
BH CV ECHO MEAS - AI DEC SLOPE: 161.4 CM/SEC^2
BH CV ECHO MEAS - AI DEC TIME: 2.3 SEC
BH CV ECHO MEAS - AI MAX PG: 56.6 MMHG
BH CV ECHO MEAS - AI MAX VEL: 376.3 CM/SEC
BH CV ECHO MEAS - AI P1/2T: 682.8 MSEC
BH CV ECHO MEAS - AO MAX PG (FULL): 0.46 MMHG
BH CV ECHO MEAS - AO MAX PG (FULL): 1.5 MMHG
BH CV ECHO MEAS - AO MAX PG: 1.9 MMHG
BH CV ECHO MEAS - AO MAX PG: 3.1 MMHG
BH CV ECHO MEAS - AO MEAN PG (FULL): 0.41 MMHG
BH CV ECHO MEAS - AO MEAN PG (FULL): 1 MMHG
BH CV ECHO MEAS - AO MEAN PG: 1.1 MMHG
BH CV ECHO MEAS - AO MEAN PG: 1.7 MMHG
BH CV ECHO MEAS - AO ROOT AREA (BSA CORRECTED): 1.8
BH CV ECHO MEAS - AO ROOT AREA: 12.7 CM^2
BH CV ECHO MEAS - AO ROOT AREA: 9 CM^2
BH CV ECHO MEAS - AO ROOT DIAM: 3.4 CM
BH CV ECHO MEAS - AO ROOT DIAM: 4 CM
BH CV ECHO MEAS - AO V2 MAX: 68.6 CM/SEC
BH CV ECHO MEAS - AO V2 MAX: 88.5 CM/SEC
BH CV ECHO MEAS - AO V2 MEAN: 50.1 CM/SEC
BH CV ECHO MEAS - AO V2 MEAN: 60.8 CM/SEC
BH CV ECHO MEAS - AO V2 VTI: 14 CM
BH CV ECHO MEAS - AO V2 VTI: 5.5 CM
BH CV ECHO MEAS - ASC AORTA: 3.8 CM
BH CV ECHO MEAS - AVA(I,A): 3.1 CM^2
BH CV ECHO MEAS - AVA(I,A): 4.2 CM^2
BH CV ECHO MEAS - AVA(I,D): 3.1 CM^2
BH CV ECHO MEAS - AVA(I,D): 4.2 CM^2
BH CV ECHO MEAS - AVA(V,A): 3.1 CM^2
BH CV ECHO MEAS - AVA(V,A): 3.2 CM^2
BH CV ECHO MEAS - AVA(V,D): 3.1 CM^2
BH CV ECHO MEAS - AVA(V,D): 3.2 CM^2
BH CV ECHO MEAS - BSA(HAYCOCK): 1.8 M^2
BH CV ECHO MEAS - BSA: 1.9 M^2
BH CV ECHO MEAS - BZI_BMI: 20.9 KILOGRAMS/M^2
BH CV ECHO MEAS - BZI_METRIC_HEIGHT: 180.3 CM
BH CV ECHO MEAS - BZI_METRIC_WEIGHT: 68 KG
BH CV ECHO MEAS - EDV(CUBED): 205.4 ML
BH CV ECHO MEAS - EDV(CUBED): 239.6 ML
BH CV ECHO MEAS - EDV(MOD-SP2): 203.8 ML
BH CV ECHO MEAS - EDV(MOD-SP4): 116 ML
BH CV ECHO MEAS - EDV(MOD-SP4): 138.1 ML
BH CV ECHO MEAS - EDV(TEICH): 173.2 ML
BH CV ECHO MEAS - EDV(TEICH): 194.8 ML
BH CV ECHO MEAS - EF(CUBED): 11 %
BH CV ECHO MEAS - EF(CUBED): 7.3 %
BH CV ECHO MEAS - EF(MOD-BP): 12 %
BH CV ECHO MEAS - EF(MOD-BP): 16 %
BH CV ECHO MEAS - EF(MOD-SP2): 9.9 %
BH CV ECHO MEAS - EF(MOD-SP4): 15.5 %
BH CV ECHO MEAS - EF(MOD-SP4): 16.1 %
BH CV ECHO MEAS - EF(TEICH): 5.6 %
BH CV ECHO MEAS - EF(TEICH): 8.5 %
BH CV ECHO MEAS - ESV(CUBED): 190.4 ML
BH CV ECHO MEAS - ESV(CUBED): 213.2 ML
BH CV ECHO MEAS - ESV(MOD-SP2): 183.7 ML
BH CV ECHO MEAS - ESV(MOD-SP4): 116.7 ML
BH CV ECHO MEAS - ESV(MOD-SP4): 97.3 ML
BH CV ECHO MEAS - ESV(TEICH): 163.5 ML
BH CV ECHO MEAS - ESV(TEICH): 178.2 ML
BH CV ECHO MEAS - FS: 2.5 %
BH CV ECHO MEAS - FS: 3.8 %
BH CV ECHO MEAS - IVS/LVPW: 1
BH CV ECHO MEAS - IVS/LVPW: 1.2
BH CV ECHO MEAS - IVSD: 1.1 CM
BH CV ECHO MEAS - IVSD: 1.6 CM
BH CV ECHO MEAS - IVSS: 1.2 CM
BH CV ECHO MEAS - LA DIMENSION(2D): 5.5 CM
BH CV ECHO MEAS - LA DIMENSION(2D): 5.6 CM
BH CV ECHO MEAS - LV DIASTOLIC VOL/BSA (35-75): 74 ML/M^2
BH CV ECHO MEAS - LV MASS(C)D: 275.7 GRAMS
BH CV ECHO MEAS - LV MASS(C)D: 448.2 GRAMS
BH CV ECHO MEAS - LV MASS(C)DI: 147.7 GRAMS/M^2
BH CV ECHO MEAS - LV MASS(C)S: 288 GRAMS
BH CV ECHO MEAS - LV MASS(C)SI: 154.3 GRAMS/M^2
BH CV ECHO MEAS - LV MAX PG: 1.4 MMHG
BH CV ECHO MEAS - LV MAX PG: 1.6 MMHG
BH CV ECHO MEAS - LV MEAN PG: 0.64 MMHG
BH CV ECHO MEAS - LV MEAN PG: 0.69 MMHG
BH CV ECHO MEAS - LV SYSTOLIC VOL/BSA (12-30): 62.5 ML/M^2
BH CV ECHO MEAS - LV V1 MAX: 59.6 CM/SEC
BH CV ECHO MEAS - LV V1 MAX: 64.1 CM/SEC
BH CV ECHO MEAS - LV V1 MEAN: 35.4 CM/SEC
BH CV ECHO MEAS - LV V1 MEAN: 38.8 CM/SEC
BH CV ECHO MEAS - LV V1 VTI: 10.1 CM
BH CV ECHO MEAS - LV V1 VTI: 6.4 CM
BH CV ECHO MEAS - LVIDD: 5.9 CM
BH CV ECHO MEAS - LVIDD: 6.2 CM
BH CV ECHO MEAS - LVIDS: 5.8 CM
BH CV ECHO MEAS - LVIDS: 6 CM
BH CV ECHO MEAS - LVOT AREA: 3.6 CM^2
BH CV ECHO MEAS - LVOT AREA: 4.3 CM^2
BH CV ECHO MEAS - LVOT DIAM: 2.1 CM
BH CV ECHO MEAS - LVOT DIAM: 2.3 CM
BH CV ECHO MEAS - LVPWD: 0.97 CM
BH CV ECHO MEAS - LVPWD: 1.5 CM
BH CV ECHO MEAS - LVPWS: 1.1 CM
BH CV ECHO MEAS - MR MAX PG: 49 MMHG
BH CV ECHO MEAS - MR MAX VEL: 350 CM/SEC
BH CV ECHO MEAS - MV A MAX VEL: 0.62 CM/SEC
BH CV ECHO MEAS - MV DEC SLOPE: 439.4 CM/SEC^2
BH CV ECHO MEAS - MV DEC TIME: 0.13 SEC
BH CV ECHO MEAS - MV E MAX VEL: 28.3 CM/SEC
BH CV ECHO MEAS - MV E/A: 45.6
BH CV ECHO MEAS - MV MAX PG: 1.3 MMHG
BH CV ECHO MEAS - MV MAX PG: 1.9 MMHG
BH CV ECHO MEAS - MV MEAN PG: 0.54 MMHG
BH CV ECHO MEAS - MV MEAN PG: 1 MMHG
BH CV ECHO MEAS - MV V2 MAX: 57.7 CM/SEC
BH CV ECHO MEAS - MV V2 MAX: 68.2 CM/SEC
BH CV ECHO MEAS - MV V2 MEAN: 34.8 CM/SEC
BH CV ECHO MEAS - MV V2 MEAN: 46.8 CM/SEC
BH CV ECHO MEAS - MV V2 VTI: 16.2 CM
BH CV ECHO MEAS - MV V2 VTI: 6.3 CM
BH CV ECHO MEAS - MVA(VTI): 2.7 CM^2
BH CV ECHO MEAS - MVA(VTI): 3.7 CM^2
BH CV ECHO MEAS - PA ACC TIME: 0.06 SEC
BH CV ECHO MEAS - PA ACC TIME: 0.08 SEC
BH CV ECHO MEAS - PA MAX PG (FULL): 0.83 MMHG
BH CV ECHO MEAS - PA MAX PG (FULL): 0.97 MMHG
BH CV ECHO MEAS - PA MAX PG: 1.3 MMHG
BH CV ECHO MEAS - PA MAX PG: 1.4 MMHG
BH CV ECHO MEAS - PA MEAN PG (FULL): 0.39 MMHG
BH CV ECHO MEAS - PA MEAN PG (FULL): 0.41 MMHG
BH CV ECHO MEAS - PA MEAN PG: 0.59 MMHG
BH CV ECHO MEAS - PA MEAN PG: 0.77 MMHG
BH CV ECHO MEAS - PA PR(ACCEL): 43.7 MMHG
BH CV ECHO MEAS - PA PR(ACCEL): 52.5 MMHG
BH CV ECHO MEAS - PA V2 MAX: 56.8 CM/SEC
BH CV ECHO MEAS - PA V2 MAX: 59.8 CM/SEC
BH CV ECHO MEAS - PA V2 MEAN: 34.7 CM/SEC
BH CV ECHO MEAS - PA V2 MEAN: 40.3 CM/SEC
BH CV ECHO MEAS - PA V2 VTI: 4.3 CM
BH CV ECHO MEAS - PA V2 VTI: 9.5 CM
BH CV ECHO MEAS - PI END-D VEL: 126.2 CM/SEC
BH CV ECHO MEAS - PI MAX PG: 18.4 MMHG
BH CV ECHO MEAS - PI MAX VEL: 214.4 CM/SEC
BH CV ECHO MEAS - PVA(I,A): 6 CM^2
BH CV ECHO MEAS - PVA(I,D): 6 CM^2
BH CV ECHO MEAS - PVA(V,A): 4.8 CM^2
BH CV ECHO MEAS - PVA(V,D): 4.8 CM^2
BH CV ECHO MEAS - QP/QS: 1.3
BH CV ECHO MEAS - RAP SYSTOLE: 15 MMHG
BH CV ECHO MEAS - RAP SYSTOLE: 3 MMHG
BH CV ECHO MEAS - RV MAX PG: 0.32 MMHG
BH CV ECHO MEAS - RV MAX PG: 0.6 MMHG
BH CV ECHO MEAS - RV MEAN PG: 0.19 MMHG
BH CV ECHO MEAS - RV MEAN PG: 0.38 MMHG
BH CV ECHO MEAS - RV V1 MAX: 28.5 CM/SEC
BH CV ECHO MEAS - RV V1 MAX: 38.7 CM/SEC
BH CV ECHO MEAS - RV V1 MEAN: 20.4 CM/SEC
BH CV ECHO MEAS - RV V1 MEAN: 29.2 CM/SEC
BH CV ECHO MEAS - RV V1 VTI: 2.9 CM
BH CV ECHO MEAS - RV V1 VTI: 7.7 CM
BH CV ECHO MEAS - RVDD: 3.1 CM
BH CV ECHO MEAS - RVDD: 3.7 CM
BH CV ECHO MEAS - RVOT AREA: 7.4 CM^2
BH CV ECHO MEAS - RVOT DIAM: 3.1 CM
BH CV ECHO MEAS - RVSP: 24.3 MMHG
BH CV ECHO MEAS - RVSP: 37.3 MMHG
BH CV ECHO MEAS - SI(AO): 26.8 ML/M^2
BH CV ECHO MEAS - SI(CUBED): 14.1 ML/M^2
BH CV ECHO MEAS - SI(LVOT): 12.5 ML/M^2
BH CV ECHO MEAS - SI(MOD-SP2): 10.8 ML/M^2
BH CV ECHO MEAS - SI(MOD-SP4): 11.5 ML/M^2
BH CV ECHO MEAS - SI(TEICH): 8.9 ML/M^2
BH CV ECHO MEAS - SV(AO): 178.8 ML
BH CV ECHO MEAS - SV(AO): 50 ML
BH CV ECHO MEAS - SV(CUBED): 15 ML
BH CV ECHO MEAS - SV(CUBED): 26.4 ML
BH CV ECHO MEAS - SV(LVOT): 23.4 ML
BH CV ECHO MEAS - SV(LVOT): 43.3 ML
BH CV ECHO MEAS - SV(MOD-SP2): 20.1 ML
BH CV ECHO MEAS - SV(MOD-SP4): 18.7 ML
BH CV ECHO MEAS - SV(MOD-SP4): 21.5 ML
BH CV ECHO MEAS - SV(RVOT): 56.9 ML
BH CV ECHO MEAS - SV(TEICH): 16.5 ML
BH CV ECHO MEAS - SV(TEICH): 9.8 ML
BH CV ECHO MEAS - TR MAX VEL: 206 CM/SEC
BH CV ECHO MEAS - TR MAX VEL: 236.4 CM/SEC
BILIRUB CONJ SERPL-MCNC: 0.9 MG/DL (ref 0.2–0.3)
BILIRUB INDIRECT SERPL-MCNC: 1.2 MG/DL
BILIRUB SERPL-MCNC: 0.8 MG/DL (ref 0.2–1.2)
BILIRUB SERPL-MCNC: 1.8 MG/DL (ref 0.2–1.2)
BILIRUB SERPL-MCNC: 1.8 MG/DL (ref 0.2–1.2)
BILIRUB SERPL-MCNC: 2.1 MG/DL (ref 0.2–1.2)
BILIRUB UR QL STRIP: NEGATIVE
BUN BLD-MCNC: 48 MG/DL (ref 8–23)
BUN BLD-MCNC: 49 MG/DL (ref 8–23)
BUN BLD-MCNC: 58 MG/DL (ref 8–23)
BUN BLD-MCNC: 77 MG/DL (ref 8–23)
BUN BLD-MCNC: 83 MG/DL (ref 8–23)
BUN BLD-MCNC: 87 MG/DL (ref 8–23)
BUN BLD-MCNC: 89 MG/DL (ref 8–23)
BUN BLD-MCNC: 92 MG/DL (ref 8–23)
BUN BLD-MCNC: 93 MG/DL (ref 8–23)
BUN/CREAT SERPL: 17.7 (ref 7–25)
BUN/CREAT SERPL: 18.4 (ref 7–25)
BUN/CREAT SERPL: 18.5 (ref 7–25)
BUN/CREAT SERPL: 18.9 (ref 7–25)
BUN/CREAT SERPL: 19 (ref 7–25)
BUN/CREAT SERPL: 19.1 (ref 7–25)
BUN/CREAT SERPL: 19.1 (ref 7–25)
BUN/CREAT SERPL: 19.5 (ref 7–25)
BUN/CREAT SERPL: 19.7 (ref 7–25)
BURR CELLS BLD QL SMEAR: ABNORMAL
C PNEUM DNA NPH QL NAA+NON-PROBE: NOT DETECTED
CA-I BLDA-SCNC: 0.83 MMOL/L (ref 1.15–1.33)
CA-I BLDA-SCNC: 0.93 MMOL/L (ref 1.15–1.33)
CA-I SERPL ISE-MCNC: 1.17 MMOL/L (ref 1.2–1.3)
CALCIUM SPEC-SCNC: 10.1 MG/DL (ref 8.6–10.5)
CALCIUM SPEC-SCNC: 10.7 MG/DL (ref 8.6–10.5)
CALCIUM SPEC-SCNC: 8.4 MG/DL (ref 8.6–10.5)
CALCIUM SPEC-SCNC: 8.6 MG/DL (ref 8.6–10.5)
CALCIUM SPEC-SCNC: 8.7 MG/DL (ref 8.6–10.5)
CALCIUM SPEC-SCNC: 9.3 MG/DL (ref 8.6–10.5)
CALCIUM SPEC-SCNC: 9.4 MG/DL (ref 8.6–10.5)
CHLORIDE SERPL-SCNC: 89 MMOL/L (ref 98–107)
CHLORIDE SERPL-SCNC: 91 MMOL/L (ref 98–107)
CHLORIDE SERPL-SCNC: 92 MMOL/L (ref 98–107)
CHLORIDE SERPL-SCNC: 93 MMOL/L (ref 98–107)
CHLORIDE SERPL-SCNC: 95 MMOL/L (ref 98–107)
CHLORIDE SERPL-SCNC: 98 MMOL/L (ref 98–107)
CHLORIDE SERPL-SCNC: 98 MMOL/L (ref 98–107)
CK SERPL-CCNC: 31 U/L (ref 20–200)
CLARITY UR: CLEAR
CO2 BLDA-SCNC: 17 MMOL/L (ref 22–29)
CO2 BLDA-SCNC: 17.4 MMOL/L (ref 22–29)
CO2 BLDA-SCNC: 21.3 MMOL/L (ref 22–29)
CO2 BLDA-SCNC: 22.2 MMOL/L (ref 22–29)
CO2 BLDA-SCNC: 22.4 MMOL/L (ref 22–29)
CO2 BLDA-SCNC: 23.4 MMOL/L (ref 22–29)
CO2 SERPL-SCNC: 15 MMOL/L (ref 22–29)
CO2 SERPL-SCNC: 19 MMOL/L (ref 22–29)
CO2 SERPL-SCNC: 21 MMOL/L (ref 22–29)
CO2 SERPL-SCNC: 22 MMOL/L (ref 22–29)
CO2 SERPL-SCNC: 23 MMOL/L (ref 22–29)
CO2 SERPL-SCNC: 25 MMOL/L (ref 22–29)
CO2 SERPL-SCNC: 25 MMOL/L (ref 22–29)
CO2 SERPL-SCNC: 26.5 MMOL/L (ref 22–29)
CO2 SERPL-SCNC: 29 MMOL/L (ref 22–29)
COLOR UR: YELLOW
CORTIS SERPL-MCNC: 25.44 MCG/DL
CREAT BLD-MCNC: 2.6 MG/DL (ref 0.76–1.27)
CREAT BLD-MCNC: 2.67 MG/DL (ref 0.76–1.27)
CREAT BLD-MCNC: 3.28 MG/DL (ref 0.76–1.27)
CREAT BLD-MCNC: 4.06 MG/DL (ref 0.76–1.27)
CREAT BLD-MCNC: 4.34 MG/DL (ref 0.76–1.27)
CREAT BLD-MCNC: 4.55 MG/DL (ref 0.76–1.27)
CREAT BLD-MCNC: 4.71 MG/DL (ref 0.76–1.27)
CREAT BLD-MCNC: 4.72 MG/DL (ref 0.76–1.27)
CREAT BLD-MCNC: 4.73 MG/DL (ref 0.76–1.27)
D-LACTATE SERPL-SCNC: 1.4 MMOL/L (ref 0.5–2)
D-LACTATE SERPL-SCNC: 2.1 MMOL/L (ref 0.5–2)
D-LACTATE SERPL-SCNC: 2.2 MMOL/L (ref 0.5–2)
D-LACTATE SERPL-SCNC: 2.4 MMOL/L (ref 0.5–2)
D-LACTATE SERPL-SCNC: 2.6 MMOL/L (ref 0.5–2)
D-LACTATE SERPL-SCNC: 2.9 MMOL/L (ref 0.5–2)
D-LACTATE SERPL-SCNC: 3.3 MMOL/L (ref 0.5–2)
D-LACTATE SERPL-SCNC: 3.7 MMOL/L (ref 0.5–2)
D-LACTATE SERPL-SCNC: 3.8 MMOL/L (ref 0.5–2)
D-LACTATE SERPL-SCNC: 4.1 MMOL/L (ref 0.5–2)
D-LACTATE SERPL-SCNC: 4.2 MMOL/L (ref 0.5–2)
D-LACTATE SERPL-SCNC: 4.7 MMOL/L (ref 0.5–2)
D-LACTATE SERPL-SCNC: 4.8 MMOL/L (ref 0.5–2)
D-LACTATE SERPL-SCNC: 7.5 MMOL/L (ref 0.5–2)
DEPRECATED RDW RBC AUTO: 55.1 FL (ref 37–54)
DEPRECATED RDW RBC AUTO: 55.6 FL (ref 37–54)
DEPRECATED RDW RBC AUTO: 55.6 FL (ref 37–54)
DEPRECATED RDW RBC AUTO: 56.9 FL (ref 37–54)
DEPRECATED RDW RBC AUTO: 57.3 FL (ref 37–54)
DEPRECATED RDW RBC AUTO: 59.5 FL (ref 37–54)
EOSINOPHIL # BLD AUTO: 0 10*3/MM3 (ref 0–0.4)
EOSINOPHIL # BLD AUTO: 0.1 10*3/MM3 (ref 0–0.4)
EOSINOPHIL # BLD AUTO: 0.1 10*3/MM3 (ref 0–0.4)
EOSINOPHIL NFR BLD AUTO: 0.2 % (ref 0.3–6.2)
EOSINOPHIL NFR BLD AUTO: 0.9 % (ref 0.3–6.2)
EOSINOPHIL NFR BLD AUTO: 1.1 % (ref 0.3–6.2)
EOSINOPHIL SPEC QL MICRO: 0 % EOS/100 CELLS (ref 0–0)
ERYTHROCYTE [DISTWIDTH] IN BLOOD BY AUTOMATED COUNT: 15 % (ref 12.3–15.4)
ERYTHROCYTE [DISTWIDTH] IN BLOOD BY AUTOMATED COUNT: 15.1 % (ref 12.3–15.4)
ERYTHROCYTE [DISTWIDTH] IN BLOOD BY AUTOMATED COUNT: 15.2 % (ref 12.3–15.4)
ERYTHROCYTE [DISTWIDTH] IN BLOOD BY AUTOMATED COUNT: 15.3 % (ref 12.3–15.4)
ERYTHROCYTE [DISTWIDTH] IN BLOOD BY AUTOMATED COUNT: 15.4 % (ref 12.3–15.4)
ERYTHROCYTE [DISTWIDTH] IN BLOOD BY AUTOMATED COUNT: 15.8 % (ref 12.3–15.4)
FLUAV H1 2009 PAND RNA NPH QL NAA+PROBE: NOT DETECTED
FLUAV H1 HA GENE NPH QL NAA+PROBE: NOT DETECTED
FLUAV H3 RNA NPH QL NAA+PROBE: NOT DETECTED
FLUAV SUBTYP SPEC NAA+PROBE: NOT DETECTED
FLUBV RNA ISLT QL NAA+PROBE: NOT DETECTED
GFR SERPL CREATININE-BSD FRML MDRD: 12 ML/MIN/1.73
GFR SERPL CREATININE-BSD FRML MDRD: 13 ML/MIN/1.73
GFR SERPL CREATININE-BSD FRML MDRD: 14 ML/MIN/1.73
GFR SERPL CREATININE-BSD FRML MDRD: 18 ML/MIN/1.73
GFR SERPL CREATININE-BSD FRML MDRD: 23 ML/MIN/1.73
GFR SERPL CREATININE-BSD FRML MDRD: 24 ML/MIN/1.73
GFR SERPL CREATININE-BSD FRML MDRD: ABNORMAL ML/MIN/{1.73_M2}
GIANT PLATELETS: ABNORMAL
GLOBULIN UR ELPH-MCNC: 2.4 GM/DL
GLOBULIN UR ELPH-MCNC: 2.4 GM/DL
GLOBULIN UR ELPH-MCNC: 3.1 GM/DL
GLUCOSE BLD-MCNC: 110 MG/DL (ref 65–99)
GLUCOSE BLD-MCNC: 114 MG/DL (ref 65–99)
GLUCOSE BLD-MCNC: 118 MG/DL (ref 65–99)
GLUCOSE BLD-MCNC: 124 MG/DL (ref 65–99)
GLUCOSE BLD-MCNC: 156 MG/DL (ref 65–99)
GLUCOSE BLD-MCNC: 159 MG/DL (ref 65–99)
GLUCOSE BLD-MCNC: 167 MG/DL (ref 65–99)
GLUCOSE BLD-MCNC: 177 MG/DL (ref 65–99)
GLUCOSE BLD-MCNC: 188 MG/DL (ref 65–99)
GLUCOSE BLDC GLUCOMTR-MCNC: 108 MG/DL (ref 70–105)
GLUCOSE BLDC GLUCOMTR-MCNC: 111 MG/DL (ref 70–105)
GLUCOSE BLDC GLUCOMTR-MCNC: 118 MG/DL (ref 70–105)
GLUCOSE BLDC GLUCOMTR-MCNC: 138 MG/DL (ref 70–105)
GLUCOSE BLDC GLUCOMTR-MCNC: 145 MG/DL (ref 70–105)
GLUCOSE BLDC GLUCOMTR-MCNC: 150 MG/DL (ref 70–105)
GLUCOSE BLDC GLUCOMTR-MCNC: 163 MG/DL (ref 70–105)
GLUCOSE BLDC GLUCOMTR-MCNC: 180 MG/DL (ref 74–100)
GLUCOSE BLDC GLUCOMTR-MCNC: 198 MG/DL (ref 74–100)
GLUCOSE BLDC GLUCOMTR-MCNC: 207 MG/DL (ref 70–105)
GLUCOSE BLDC GLUCOMTR-MCNC: 98 MG/DL (ref 70–105)
GLUCOSE UR STRIP-MCNC: NEGATIVE MG/DL
HADV DNA SPEC NAA+PROBE: NOT DETECTED
HCO3 BLDA-SCNC: 16 MMOL/L (ref 21–28)
HCO3 BLDA-SCNC: 16.6 MMOL/L (ref 21–28)
HCO3 BLDA-SCNC: 20.3 MMOL/L (ref 21–28)
HCO3 BLDA-SCNC: 21 MMOL/L (ref 21–28)
HCO3 BLDA-SCNC: 21.5 MMOL/L (ref 21–28)
HCO3 BLDA-SCNC: 22.4 MMOL/L (ref 21–28)
HCOV 229E RNA SPEC QL NAA+PROBE: NOT DETECTED
HCOV HKU1 RNA SPEC QL NAA+PROBE: NOT DETECTED
HCOV NL63 RNA SPEC QL NAA+PROBE: NOT DETECTED
HCOV OC43 RNA SPEC QL NAA+PROBE: NOT DETECTED
HCT VFR BLD AUTO: 40.6 % (ref 37.5–51)
HCT VFR BLD AUTO: 40.9 % (ref 37.5–51)
HCT VFR BLD AUTO: 43.8 % (ref 37.5–51)
HCT VFR BLD AUTO: 43.9 % (ref 37.5–51)
HCT VFR BLD AUTO: 46.2 % (ref 37.5–51)
HCT VFR BLD AUTO: 48.9 % (ref 37.5–51)
HCT VFR BLDA CALC: 40 % (ref 38–51)
HCT VFR BLDA CALC: 47 % (ref 38–51)
HEMODILUTION: NO
HGB BLD-MCNC: 13.6 G/DL (ref 13–17.7)
HGB BLD-MCNC: 13.8 G/DL (ref 13–17.7)
HGB BLD-MCNC: 14.7 G/DL (ref 13–17.7)
HGB BLD-MCNC: 14.9 G/DL (ref 13–17.7)
HGB BLD-MCNC: 15.4 G/DL (ref 13–17.7)
HGB BLD-MCNC: 16.1 G/DL (ref 13–17.7)
HGB BLDA-MCNC: 13.6 G/DL (ref 12–17)
HGB BLDA-MCNC: 15.9 G/DL (ref 12–17)
HGB UR QL STRIP.AUTO: NEGATIVE
HMPV RNA NPH QL NAA+NON-PROBE: NOT DETECTED
HOLD SPECIMEN: NORMAL
HOROWITZ INDEX BLD+IHG-RTO: 100 %
HOROWITZ INDEX BLD+IHG-RTO: 100 %
HOROWITZ INDEX BLD+IHG-RTO: 40 %
HOROWITZ INDEX BLD+IHG-RTO: 44 %
HPIV1 RNA SPEC QL NAA+PROBE: NOT DETECTED
HPIV2 RNA SPEC QL NAA+PROBE: NOT DETECTED
HPIV3 RNA NPH QL NAA+PROBE: NOT DETECTED
HPIV4 P GENE NPH QL NAA+PROBE: NOT DETECTED
INR PPP: 1.19 (ref 0.9–1.1)
INR PPP: 1.44 (ref 0.9–1.1)
INR PPP: 1.5 (ref 2–3)
INR PPP: 1.79 (ref 0.9–1.1)
KETONES UR QL STRIP: NEGATIVE
LARGE PLATELETS: ABNORMAL
LEUKOCYTE ESTERASE UR QL STRIP.AUTO: NEGATIVE
LV EF 2D ECHO EST: 10 %
LV EF 2D ECHO EST: 20 %
LYMPHOCYTES # BLD AUTO: 0.6 10*3/MM3 (ref 0.7–3.1)
LYMPHOCYTES # BLD AUTO: 0.7 10*3/MM3 (ref 0.7–3.1)
LYMPHOCYTES # BLD AUTO: 0.7 10*3/MM3 (ref 0.7–3.1)
LYMPHOCYTES # BLD MANUAL: 0.92 10*3/MM3 (ref 0.7–3.1)
LYMPHOCYTES # BLD MANUAL: 1.06 10*3/MM3 (ref 0.7–3.1)
LYMPHOCYTES NFR BLD AUTO: 6.6 % (ref 19.6–45.3)
LYMPHOCYTES NFR BLD AUTO: 7.6 % (ref 19.6–45.3)
LYMPHOCYTES NFR BLD AUTO: 9.1 % (ref 19.6–45.3)
LYMPHOCYTES NFR BLD MANUAL: 6 % (ref 5–12)
LYMPHOCYTES NFR BLD MANUAL: 7 % (ref 19.6–45.3)
LYMPHOCYTES NFR BLD MANUAL: 7 % (ref 5–12)
LYMPHOCYTES NFR BLD MANUAL: 9 % (ref 19.6–45.3)
M PNEUMO IGG SER IA-ACNC: NOT DETECTED
MACROCYTES BLD QL SMEAR: ABNORMAL
MAGNESIUM SERPL-MCNC: 2.6 MG/DL (ref 1.6–2.4)
MAGNESIUM SERPL-MCNC: 2.9 MG/DL (ref 1.6–2.4)
MAGNESIUM SERPL-MCNC: 2.9 MG/DL (ref 1.6–2.4)
MAXIMAL PREDICTED HEART RATE: 138 BPM
MAXIMAL PREDICTED HEART RATE: 138 BPM
MCH RBC QN AUTO: 34.9 PG (ref 26.6–33)
MCH RBC QN AUTO: 35.3 PG (ref 26.6–33)
MCH RBC QN AUTO: 35.6 PG (ref 26.6–33)
MCH RBC QN AUTO: 35.8 PG (ref 26.6–33)
MCH RBC QN AUTO: 35.8 PG (ref 26.6–33)
MCH RBC QN AUTO: 36.1 PG (ref 26.6–33)
MCHC RBC AUTO-ENTMCNC: 32.9 G/DL (ref 31.5–35.7)
MCHC RBC AUTO-ENTMCNC: 33.4 G/DL (ref 31.5–35.7)
MCHC RBC AUTO-ENTMCNC: 33.5 G/DL (ref 31.5–35.7)
MCHC RBC AUTO-ENTMCNC: 33.5 G/DL (ref 31.5–35.7)
MCHC RBC AUTO-ENTMCNC: 33.7 G/DL (ref 31.5–35.7)
MCHC RBC AUTO-ENTMCNC: 34 G/DL (ref 31.5–35.7)
MCV RBC AUTO: 104.7 FL (ref 79–97)
MCV RBC AUTO: 106.2 FL (ref 79–97)
MCV RBC AUTO: 106.3 FL (ref 79–97)
MCV RBC AUTO: 106.4 FL (ref 79–97)
MCV RBC AUTO: 106.7 FL (ref 79–97)
MCV RBC AUTO: 107.1 FL (ref 79–97)
MODALITY: ABNORMAL
MONOCYTES # BLD AUTO: 0.6 10*3/MM3 (ref 0.1–0.9)
MONOCYTES # BLD AUTO: 0.6 10*3/MM3 (ref 0.1–0.9)
MONOCYTES # BLD AUTO: 0.79 10*3/MM3 (ref 0.1–0.9)
MONOCYTES # BLD AUTO: 0.8 10*3/MM3 (ref 0.1–0.9)
MONOCYTES # BLD AUTO: 0.83 10*3/MM3 (ref 0.1–0.9)
MONOCYTES NFR BLD AUTO: 6.9 % (ref 5–12)
MONOCYTES NFR BLD AUTO: 8.1 % (ref 5–12)
MONOCYTES NFR BLD AUTO: 8.2 % (ref 5–12)
MRSA DNA SPEC QL NAA+PROBE: NORMAL
NEUTROPHILS # BLD AUTO: 11.48 10*3/MM3 (ref 1.7–7)
NEUTROPHILS # BLD AUTO: 6.3 10*3/MM3 (ref 1.7–7)
NEUTROPHILS # BLD AUTO: 7.3 10*3/MM3 (ref 1.7–7)
NEUTROPHILS # BLD AUTO: 8.1 10*3/MM3 (ref 1.7–7)
NEUTROPHILS # BLD AUTO: 9.79 10*3/MM3 (ref 1.7–7)
NEUTROPHILS NFR BLD AUTO: 81.5 % (ref 42.7–76)
NEUTROPHILS NFR BLD AUTO: 84.2 % (ref 42.7–76)
NEUTROPHILS NFR BLD AUTO: 84.3 % (ref 42.7–76)
NEUTROPHILS NFR BLD MANUAL: 72 % (ref 42.7–76)
NEUTROPHILS NFR BLD MANUAL: 87 % (ref 42.7–76)
NEUTS BAND NFR BLD MANUAL: 11 % (ref 0–5)
NEUTS VAC BLD QL SMEAR: ABNORMAL
NITRITE UR QL STRIP: NEGATIVE
NRBC BLD AUTO-RTO: 0.3 /100 WBC (ref 0–0.2)
NRBC BLD AUTO-RTO: 0.4 /100 WBC (ref 0–0.2)
NRBC BLD AUTO-RTO: 2 /100 WBC (ref 0–0.2)
NRBC SPEC MANUAL: 2 /100 WBC (ref 0–0.2)
NRBC SPEC MANUAL: 8 /100 WBC (ref 0–0.2)
NT-PROBNP SERPL-MCNC: ABNORMAL PG/ML (ref 5–1800)
PCO2 BLDA: 24 MM HG (ref 35–48)
PCO2 BLDA: 31.1 MM HG (ref 35–48)
PCO2 BLDA: 31.9 MM HG (ref 35–48)
PCO2 BLDA: 32 MM HG (ref 35–48)
PCO2 BLDA: 34.1 MM HG (ref 35–48)
PCO2 BLDA: 41.6 MM HG (ref 35–48)
PEEP RESPIRATORY: 5 CM[H2O]
PH BLDA: 7.28 PH UNITS (ref 7.35–7.45)
PH BLDA: 7.31 PH UNITS (ref 7.35–7.45)
PH BLDA: 7.41 PH UNITS (ref 7.35–7.45)
PH BLDA: 7.45 PH UNITS (ref 7.35–7.45)
PH UR STRIP.AUTO: 6.5 [PH] (ref 5–8)
PHOSPHATE SERPL-MCNC: 4.4 MG/DL (ref 2.5–4.5)
PHOSPHATE SERPL-MCNC: 7.1 MG/DL (ref 2.5–4.5)
PHOSPHATE SERPL-MCNC: 7.1 MG/DL (ref 2.5–4.5)
PLATELET # BLD AUTO: 111 10*3/MM3 (ref 140–450)
PLATELET # BLD AUTO: 111 10*3/MM3 (ref 140–450)
PLATELET # BLD AUTO: 135 10*3/MM3 (ref 140–450)
PLATELET # BLD AUTO: 85 10*3/MM3 (ref 140–450)
PLATELET # BLD AUTO: 88 10*3/MM3 (ref 140–450)
PLATELET # BLD AUTO: 94 10*3/MM3 (ref 140–450)
PMV BLD AUTO: 10 FL (ref 6–12)
PMV BLD AUTO: 10.4 FL (ref 6–12)
PMV BLD AUTO: 10.7 FL (ref 6–12)
PMV BLD AUTO: 12.2 FL (ref 6–12)
PMV BLD AUTO: 12.2 FL (ref 6–12)
PMV BLD AUTO: 12.3 FL (ref 6–12)
PO2 BLDA: 116.1 MM HG (ref 83–108)
PO2 BLDA: 350.4 MM HG (ref 83–108)
PO2 BLDA: 75.3 MM HG (ref 83–108)
PO2 BLDA: 96.2 MM HG (ref 83–108)
PO2 BLDA: 99.9 MM HG (ref 83–108)
PO2 BLDA: 99.9 MM HG (ref 83–108)
POIKILOCYTOSIS BLD QL SMEAR: ABNORMAL
POLYCHROMASIA BLD QL SMEAR: ABNORMAL
POTASSIUM BLD-SCNC: 4.5 MMOL/L (ref 3.5–5.2)
POTASSIUM BLD-SCNC: 4.7 MMOL/L (ref 3.5–5.2)
POTASSIUM BLD-SCNC: 4.9 MMOL/L (ref 3.5–5.2)
POTASSIUM BLD-SCNC: 5 MMOL/L (ref 3.5–5.2)
POTASSIUM BLD-SCNC: 5.3 MMOL/L (ref 3.5–5.2)
POTASSIUM BLD-SCNC: 5.5 MMOL/L (ref 3.5–5.2)
POTASSIUM BLD-SCNC: 5.6 MMOL/L (ref 3.5–5.2)
POTASSIUM BLDA-SCNC: 4.3 MMOL/L (ref 3.5–4.5)
POTASSIUM BLDA-SCNC: 5 MMOL/L (ref 3.5–4.5)
PROCALCITONIN SERPL-MCNC: 0.47 NG/ML (ref 0.1–0.25)
PROCALCITONIN SERPL-MCNC: 1.93 NG/ML (ref 0.1–0.25)
PROT SERPL-MCNC: 5.6 G/DL (ref 6–8.5)
PROT SERPL-MCNC: 5.7 G/DL (ref 6–8.5)
PROT SERPL-MCNC: 6.2 G/DL (ref 6–8.5)
PROT SERPL-MCNC: 7.1 G/DL (ref 6–8.5)
PROT UR QL STRIP: NEGATIVE
PROTHROMBIN TIME: 12.2 SECONDS (ref 9.6–11.7)
PROTHROMBIN TIME: 14.3 SECONDS (ref 9.6–11.7)
PROTHROMBIN TIME: 14.8 SECONDS (ref 19.4–28.5)
PROTHROMBIN TIME: 17.3 SECONDS (ref 9.6–11.7)
RBC # BLD AUTO: 3.82 10*6/MM3 (ref 4.14–5.8)
RBC # BLD AUTO: 3.85 10*6/MM3 (ref 4.14–5.8)
RBC # BLD AUTO: 4.1 10*6/MM3 (ref 4.14–5.8)
RBC # BLD AUTO: 4.13 10*6/MM3 (ref 4.14–5.8)
RBC # BLD AUTO: 4.42 10*6/MM3 (ref 4.14–5.8)
RBC # BLD AUTO: 4.57 10*6/MM3 (ref 4.14–5.8)
RESPIRATORY RATE: 20
RHINOVIRUS RNA SPEC NAA+PROBE: NOT DETECTED
RSV RNA NPH QL NAA+NON-PROBE: NOT DETECTED
SAO2 % BLDCOA: 93.2 % (ref 94–98)
SAO2 % BLDCOA: 97.9 % (ref 94–98)
SAO2 % BLDCOA: 98 % (ref 94–98)
SAO2 % BLDCOA: 98.1 % (ref 94–98)
SAO2 % BLDCOA: 98.8 % (ref 94–98)
SAO2 % BLDCOA: 99.9 % (ref 94–98)
SCAN SLIDE: NORMAL
SODIUM BLD-SCNC: 131 MMOL/L (ref 136–145)
SODIUM BLD-SCNC: 131 MMOL/L (ref 136–145)
SODIUM BLD-SCNC: 133 MMOL/L (ref 136–145)
SODIUM BLD-SCNC: 134 MMOL/L (ref 136–145)
SODIUM BLD-SCNC: 136 MMOL/L (ref 136–145)
SODIUM BLD-SCNC: 138 MMOL/L (ref 136–145)
SODIUM BLD-SCNC: 141 MMOL/L (ref 136–145)
SODIUM BLDA-SCNC: 127 MMOL/L (ref 138–146)
SODIUM BLDA-SCNC: 131 MMOL/L (ref 138–146)
SP GR UR STRIP: 1.01 (ref 1–1.03)
STRESS TARGET HR: 117 BPM
STRESS TARGET HR: 117 BPM
TOXIC GRANULATION: ABNORMAL
TROPONIN T SERPL-MCNC: 0.04 NG/ML (ref 0–0.03)
TROPONIN T SERPL-MCNC: 0.06 NG/ML (ref 0–0.03)
TROPONIN T SERPL-MCNC: 0.25 NG/ML (ref 0–0.03)
TROPONIN T SERPL-MCNC: 0.27 NG/ML (ref 0–0.03)
TROPONIN T SERPL-MCNC: 0.27 NG/ML (ref 0–0.03)
TROPONIN T SERPL-MCNC: 0.29 NG/ML (ref 0–0.03)
TROPONIN T SERPL-MCNC: 0.32 NG/ML (ref 0–0.03)
TROPONIN T SERPL-MCNC: 0.36 NG/ML (ref 0–0.03)
TROPONIN T SERPL-MCNC: 0.36 NG/ML (ref 0–0.03)
TROPONIN T SERPL-MCNC: 0.38 NG/ML (ref 0–0.03)
TROPONIN T SERPL-MCNC: 0.4 NG/ML (ref 0–0.03)
TROPONIN T SERPL-MCNC: 0.44 NG/ML (ref 0–0.03)
TSH SERPL DL<=0.05 MIU/L-ACNC: 5.48 UIU/ML (ref 0.27–4.2)
URATE SERPL-MCNC: 12.5 MG/DL (ref 3.4–7)
URATE SERPL-MCNC: 12.6 MG/DL (ref 3.4–7)
UROBILINOGEN UR QL STRIP: NORMAL
VANCOMYCIN SERPL-MCNC: 9.2 MCG/ML (ref 5–40)
VENTILATOR MODE: ABNORMAL
VT ON VENT VENT: 500 ML
WBC MORPH BLD: NORMAL
WBC NRBC COR # BLD: 11.8 10*3/MM3 (ref 3.4–10.8)
WBC NRBC COR # BLD: 12.6 10*3/MM3 (ref 3.4–10.8)
WBC NRBC COR # BLD: 13.2 10*3/MM3 (ref 3.4–10.8)
WBC NRBC COR # BLD: 7.7 10*3/MM3 (ref 3.4–10.8)
WBC NRBC COR # BLD: 8.6 10*3/MM3 (ref 3.4–10.8)
WBC NRBC COR # BLD: 9.6 10*3/MM3 (ref 3.4–10.8)
WHOLE BLOOD HOLD SPECIMEN: NORMAL

## 2020-01-01 PROCEDURE — 87205 SMEAR GRAM STAIN: CPT | Performed by: INTERNAL MEDICINE

## 2020-01-01 PROCEDURE — 93005 ELECTROCARDIOGRAM TRACING: CPT | Performed by: INTERNAL MEDICINE

## 2020-01-01 PROCEDURE — 83605 ASSAY OF LACTIC ACID: CPT | Performed by: NURSE PRACTITIONER

## 2020-01-01 PROCEDURE — 84484 ASSAY OF TROPONIN QUANT: CPT | Performed by: PHYSICIAN ASSISTANT

## 2020-01-01 PROCEDURE — 87040 BLOOD CULTURE FOR BACTERIA: CPT | Performed by: EMERGENCY MEDICINE

## 2020-01-01 PROCEDURE — 74018 RADEX ABDOMEN 1 VIEW: CPT

## 2020-01-01 PROCEDURE — 80048 BASIC METABOLIC PNL TOTAL CA: CPT

## 2020-01-01 PROCEDURE — 82330 ASSAY OF CALCIUM: CPT

## 2020-01-01 PROCEDURE — 25010000002 MIDAZOLAM PER 1 MG: Performed by: NURSE PRACTITIONER

## 2020-01-01 PROCEDURE — 83605 ASSAY OF LACTIC ACID: CPT | Performed by: FAMILY MEDICINE

## 2020-01-01 PROCEDURE — 25010000002 FENTANYL CITRATE (PF) 100 MCG/2ML SOLUTION: Performed by: NURSE PRACTITIONER

## 2020-01-01 PROCEDURE — 80051 ELECTROLYTE PANEL: CPT

## 2020-01-01 PROCEDURE — 85610 PROTHROMBIN TIME: CPT | Performed by: PHYSICIAN ASSISTANT

## 2020-01-01 PROCEDURE — 93010 ELECTROCARDIOGRAM REPORT: CPT | Performed by: INTERNAL MEDICINE

## 2020-01-01 PROCEDURE — 84550 ASSAY OF BLOOD/URIC ACID: CPT | Performed by: INTERNAL MEDICINE

## 2020-01-01 PROCEDURE — 85610 PROTHROMBIN TIME: CPT | Performed by: EMERGENCY MEDICINE

## 2020-01-01 PROCEDURE — 94003 VENT MGMT INPAT SUBQ DAY: CPT

## 2020-01-01 PROCEDURE — 85730 THROMBOPLASTIN TIME PARTIAL: CPT | Performed by: INTERNAL MEDICINE

## 2020-01-01 PROCEDURE — 84145 PROCALCITONIN (PCT): CPT | Performed by: INTERNAL MEDICINE

## 2020-01-01 PROCEDURE — 94799 UNLISTED PULMONARY SVC/PX: CPT

## 2020-01-01 PROCEDURE — 83605 ASSAY OF LACTIC ACID: CPT

## 2020-01-01 PROCEDURE — 84484 ASSAY OF TROPONIN QUANT: CPT | Performed by: EMERGENCY MEDICINE

## 2020-01-01 PROCEDURE — 83735 ASSAY OF MAGNESIUM: CPT | Performed by: NURSE PRACTITIONER

## 2020-01-01 PROCEDURE — 94002 VENT MGMT INPAT INIT DAY: CPT

## 2020-01-01 PROCEDURE — 25010000002 CEFTRIAXONE PER 250 MG: Performed by: FAMILY MEDICINE

## 2020-01-01 PROCEDURE — 25010000002 DOBUTAMINE PER 250 MG: Performed by: INTERNAL MEDICINE

## 2020-01-01 PROCEDURE — 80202 ASSAY OF VANCOMYCIN: CPT | Performed by: FAMILY MEDICINE

## 2020-01-01 PROCEDURE — 83880 ASSAY OF NATRIURETIC PEPTIDE: CPT | Performed by: FAMILY MEDICINE

## 2020-01-01 PROCEDURE — 31500 INSERT EMERGENCY AIRWAY: CPT | Performed by: NURSE PRACTITIONER

## 2020-01-01 PROCEDURE — 82962 GLUCOSE BLOOD TEST: CPT

## 2020-01-01 PROCEDURE — 85007 BL SMEAR W/DIFF WBC COUNT: CPT | Performed by: INTERNAL MEDICINE

## 2020-01-01 PROCEDURE — 0099U HC BIOFIRE FILMARRAY RESP PANEL 1: CPT | Performed by: PHYSICIAN ASSISTANT

## 2020-01-01 PROCEDURE — 82803 BLOOD GASES ANY COMBINATION: CPT

## 2020-01-01 PROCEDURE — 93306 TTE W/DOPPLER COMPLETE: CPT | Performed by: INTERNAL MEDICINE

## 2020-01-01 PROCEDURE — 85730 THROMBOPLASTIN TIME PARTIAL: CPT | Performed by: FAMILY MEDICINE

## 2020-01-01 PROCEDURE — 36600 WITHDRAWAL OF ARTERIAL BLOOD: CPT

## 2020-01-01 PROCEDURE — 96365 THER/PROPH/DIAG IV INF INIT: CPT

## 2020-01-01 PROCEDURE — 25010000002 ONDANSETRON PER 1 MG: Performed by: PHYSICIAN ASSISTANT

## 2020-01-01 PROCEDURE — 99285 EMERGENCY DEPT VISIT HI MDM: CPT

## 2020-01-01 PROCEDURE — 85730 THROMBOPLASTIN TIME PARTIAL: CPT | Performed by: PHYSICIAN ASSISTANT

## 2020-01-01 PROCEDURE — G0378 HOSPITAL OBSERVATION PER HR: HCPCS

## 2020-01-01 PROCEDURE — 94640 AIRWAY INHALATION TREATMENT: CPT

## 2020-01-01 PROCEDURE — 25010000002 AMIODARONE PER 30 MG: Performed by: INTERNAL MEDICINE

## 2020-01-01 PROCEDURE — 85025 COMPLETE CBC W/AUTO DIFF WBC: CPT | Performed by: FAMILY MEDICINE

## 2020-01-01 PROCEDURE — 84484 ASSAY OF TROPONIN QUANT: CPT | Performed by: NURSE PRACTITIONER

## 2020-01-01 PROCEDURE — 25010000002 HEPARIN (PORCINE) PER 1000 UNITS: Performed by: FAMILY MEDICINE

## 2020-01-01 PROCEDURE — 99233 SBSQ HOSP IP/OBS HIGH 50: CPT | Performed by: INTERNAL MEDICINE

## 2020-01-01 PROCEDURE — 96368 THER/DIAG CONCURRENT INF: CPT

## 2020-01-01 PROCEDURE — 63710000001 INSULIN LISPRO (HUMAN) PER 5 UNITS: Performed by: NURSE PRACTITIONER

## 2020-01-01 PROCEDURE — 85025 COMPLETE CBC W/AUTO DIFF WBC: CPT

## 2020-01-01 PROCEDURE — 03HB33Z INSERTION OF INFUSION DEVICE INTO RIGHT RADIAL ARTERY, PERCUTANEOUS APPROACH: ICD-10-PCS | Performed by: INTERNAL MEDICINE

## 2020-01-01 PROCEDURE — 85018 HEMOGLOBIN: CPT

## 2020-01-01 PROCEDURE — 87040 BLOOD CULTURE FOR BACTERIA: CPT

## 2020-01-01 PROCEDURE — 96375 TX/PRO/DX INJ NEW DRUG ADDON: CPT

## 2020-01-01 PROCEDURE — 93005 ELECTROCARDIOGRAM TRACING: CPT | Performed by: FAMILY MEDICINE

## 2020-01-01 PROCEDURE — 99222 1ST HOSP IP/OBS MODERATE 55: CPT | Performed by: NURSE PRACTITIONER

## 2020-01-01 PROCEDURE — 85027 COMPLETE CBC AUTOMATED: CPT | Performed by: FAMILY MEDICINE

## 2020-01-01 PROCEDURE — B548ZZA ULTRASONOGRAPHY OF SUPERIOR VENA CAVA, GUIDANCE: ICD-10-PCS | Performed by: INTERNAL MEDICINE

## 2020-01-01 PROCEDURE — 80053 COMPREHEN METABOLIC PANEL: CPT | Performed by: NURSE PRACTITIONER

## 2020-01-01 PROCEDURE — 02HV33Z INSERTION OF INFUSION DEVICE INTO SUPERIOR VENA CAVA, PERCUTANEOUS APPROACH: ICD-10-PCS | Performed by: INTERNAL MEDICINE

## 2020-01-01 PROCEDURE — 80048 BASIC METABOLIC PNL TOTAL CA: CPT | Performed by: EMERGENCY MEDICINE

## 2020-01-01 PROCEDURE — 25010000002 HEPARIN (PORCINE) PER 1000 UNITS: Performed by: PHYSICIAN ASSISTANT

## 2020-01-01 PROCEDURE — 5A1945Z RESPIRATORY VENTILATION, 24-96 CONSECUTIVE HOURS: ICD-10-PCS | Performed by: NURSE PRACTITIONER

## 2020-01-01 PROCEDURE — 25010000002 ONDANSETRON PER 1 MG: Performed by: FAMILY MEDICINE

## 2020-01-01 PROCEDURE — 80053 COMPREHEN METABOLIC PANEL: CPT | Performed by: INTERNAL MEDICINE

## 2020-01-01 PROCEDURE — 87205 SMEAR GRAM STAIN: CPT | Performed by: NURSE PRACTITIONER

## 2020-01-01 PROCEDURE — 85025 COMPLETE CBC W/AUTO DIFF WBC: CPT | Performed by: INTERNAL MEDICINE

## 2020-01-01 PROCEDURE — 25010000002 VANCOMYCIN 10 G RECONSTITUTED SOLUTION: Performed by: PHYSICIAN ASSISTANT

## 2020-01-01 PROCEDURE — 82533 TOTAL CORTISOL: CPT | Performed by: INTERNAL MEDICINE

## 2020-01-01 PROCEDURE — 93005 ELECTROCARDIOGRAM TRACING: CPT | Performed by: EMERGENCY MEDICINE

## 2020-01-01 PROCEDURE — 93000 ELECTROCARDIOGRAM COMPLETE: CPT | Performed by: INTERNAL MEDICINE

## 2020-01-01 PROCEDURE — 93005 ELECTROCARDIOGRAM TRACING: CPT | Performed by: PHYSICIAN ASSISTANT

## 2020-01-01 PROCEDURE — 25010000002 CALCIUM GLUCONATE 2-0.675 GM/100ML-% SOLUTION: Performed by: NURSE PRACTITIONER

## 2020-01-01 PROCEDURE — 25010000002 ENOXAPARIN PER 10 MG: Performed by: FAMILY MEDICINE

## 2020-01-01 PROCEDURE — 85610 PROTHROMBIN TIME: CPT | Performed by: NURSE PRACTITIONER

## 2020-01-01 PROCEDURE — 25010000002 FUROSEMIDE PER 20 MG: Performed by: EMERGENCY MEDICINE

## 2020-01-01 PROCEDURE — 87070 CULTURE OTHR SPECIMN AEROBIC: CPT | Performed by: NURSE PRACTITIONER

## 2020-01-01 PROCEDURE — 83880 ASSAY OF NATRIURETIC PEPTIDE: CPT | Performed by: EMERGENCY MEDICINE

## 2020-01-01 PROCEDURE — 96372 THER/PROPH/DIAG INJ SC/IM: CPT

## 2020-01-01 PROCEDURE — 25010000002 AZITHROMYCIN PER 500 MG: Performed by: NURSE PRACTITIONER

## 2020-01-01 PROCEDURE — 51798 US URINE CAPACITY MEASURE: CPT

## 2020-01-01 PROCEDURE — 83735 ASSAY OF MAGNESIUM: CPT | Performed by: FAMILY MEDICINE

## 2020-01-01 PROCEDURE — 71045 X-RAY EXAM CHEST 1 VIEW: CPT

## 2020-01-01 PROCEDURE — 25010000002 PHENYLEPHRINE 10 MG/ML SOLUTION: Performed by: NURSE PRACTITIONER

## 2020-01-01 PROCEDURE — 87641 MR-STAPH DNA AMP PROBE: CPT | Performed by: NURSE PRACTITIONER

## 2020-01-01 PROCEDURE — 99214 OFFICE O/P EST MOD 30 MIN: CPT | Performed by: INTERNAL MEDICINE

## 2020-01-01 PROCEDURE — 80048 BASIC METABOLIC PNL TOTAL CA: CPT | Performed by: INTERNAL MEDICINE

## 2020-01-01 PROCEDURE — 25010000002 CHLOROTHIAZIDE PER 500 MG: Performed by: INTERNAL MEDICINE

## 2020-01-01 PROCEDURE — 85007 BL SMEAR W/DIFF WBC COUNT: CPT | Performed by: FAMILY MEDICINE

## 2020-01-01 PROCEDURE — 25010000002 CEFEPIME PER 500 MG: Performed by: FAMILY MEDICINE

## 2020-01-01 PROCEDURE — 63710000001 INSULIN REGULAR HUMAN PER 5 UNITS: Performed by: NURSE PRACTITIONER

## 2020-01-01 PROCEDURE — 25010000002 SULFUR HEXAFLUORIDE MICROSPH 60.7-25 MG RECONSTITUTED SUSPENSION: Performed by: INTERNAL MEDICINE

## 2020-01-01 PROCEDURE — 93005 ELECTROCARDIOGRAM TRACING: CPT

## 2020-01-01 PROCEDURE — 84484 ASSAY OF TROPONIN QUANT: CPT | Performed by: FAMILY MEDICINE

## 2020-01-01 PROCEDURE — 25010000002 FENTANYL CITRATE (PF) 100 MCG/2ML SOLUTION

## 2020-01-01 PROCEDURE — 85730 THROMBOPLASTIN TIME PARTIAL: CPT | Performed by: EMERGENCY MEDICINE

## 2020-01-01 PROCEDURE — 82550 ASSAY OF CK (CPK): CPT | Performed by: INTERNAL MEDICINE

## 2020-01-01 PROCEDURE — 0BH17EZ INSERTION OF ENDOTRACHEAL AIRWAY INTO TRACHEA, VIA NATURAL OR ARTIFICIAL OPENING: ICD-10-PCS | Performed by: NURSE PRACTITIONER

## 2020-01-01 PROCEDURE — 85610 PROTHROMBIN TIME: CPT | Performed by: INTERNAL MEDICINE

## 2020-01-01 PROCEDURE — 96376 TX/PRO/DX INJ SAME DRUG ADON: CPT

## 2020-01-01 PROCEDURE — 25010000002 CEFEPIME PER 500 MG: Performed by: EMERGENCY MEDICINE

## 2020-01-01 PROCEDURE — 25010000002 CEFEPIME PER 500 MG: Performed by: PHYSICIAN ASSISTANT

## 2020-01-01 PROCEDURE — 25010000002 CALCIUM GLUCONATE-NACL 1-0.675 GM/50ML-% SOLUTION: Performed by: INTERNAL MEDICINE

## 2020-01-01 PROCEDURE — 85730 THROMBOPLASTIN TIME PARTIAL: CPT | Performed by: NURSE PRACTITIONER

## 2020-01-01 PROCEDURE — 83605 ASSAY OF LACTIC ACID: CPT | Performed by: PHYSICIAN ASSISTANT

## 2020-01-01 PROCEDURE — 25010000002 PROPOFOL 10 MG/ML EMULSION

## 2020-01-01 PROCEDURE — 25010000002 MIDAZOLAM 50 MG/10ML SOLUTION: Performed by: NURSE PRACTITIONER

## 2020-01-01 PROCEDURE — 80076 HEPATIC FUNCTION PANEL: CPT | Performed by: INTERNAL MEDICINE

## 2020-01-01 PROCEDURE — 85027 COMPLETE CBC AUTOMATED: CPT | Performed by: NURSE PRACTITIONER

## 2020-01-01 PROCEDURE — 83880 ASSAY OF NATRIURETIC PEPTIDE: CPT | Performed by: PHYSICIAN ASSISTANT

## 2020-01-01 PROCEDURE — 97161 PT EVAL LOW COMPLEX 20 MIN: CPT

## 2020-01-01 PROCEDURE — 93306 TTE W/DOPPLER COMPLETE: CPT

## 2020-01-01 PROCEDURE — 83735 ASSAY OF MAGNESIUM: CPT | Performed by: INTERNAL MEDICINE

## 2020-01-01 PROCEDURE — 25010000002 HYDROMORPHONE PER 4 MG: Performed by: NURSE PRACTITIONER

## 2020-01-01 PROCEDURE — 25010000002 LORAZEPAM PER 2 MG: Performed by: NURSE PRACTITIONER

## 2020-01-01 PROCEDURE — 85025 COMPLETE CBC W/AUTO DIFF WBC: CPT | Performed by: EMERGENCY MEDICINE

## 2020-01-01 PROCEDURE — 84100 ASSAY OF PHOSPHORUS: CPT | Performed by: NURSE PRACTITIONER

## 2020-01-01 PROCEDURE — 82330 ASSAY OF CALCIUM: CPT | Performed by: INTERNAL MEDICINE

## 2020-01-01 PROCEDURE — 84100 ASSAY OF PHOSPHORUS: CPT | Performed by: FAMILY MEDICINE

## 2020-01-01 PROCEDURE — 76775 US EXAM ABDO BACK WALL LIM: CPT

## 2020-01-01 PROCEDURE — 84145 PROCALCITONIN (PCT): CPT | Performed by: NURSE PRACTITIONER

## 2020-01-01 PROCEDURE — 36415 COLL VENOUS BLD VENIPUNCTURE: CPT

## 2020-01-01 PROCEDURE — 84484 ASSAY OF TROPONIN QUANT: CPT | Performed by: INTERNAL MEDICINE

## 2020-01-01 PROCEDURE — 80053 COMPREHEN METABOLIC PANEL: CPT

## 2020-01-01 PROCEDURE — 93282 PRGRMG EVAL IMPLANTABLE DFB: CPT | Performed by: INTERNAL MEDICINE

## 2020-01-01 PROCEDURE — 80069 RENAL FUNCTION PANEL: CPT | Performed by: INTERNAL MEDICINE

## 2020-01-01 PROCEDURE — 99222 1ST HOSP IP/OBS MODERATE 55: CPT | Performed by: INTERNAL MEDICINE

## 2020-01-01 PROCEDURE — 84443 ASSAY THYROID STIM HORMONE: CPT | Performed by: FAMILY MEDICINE

## 2020-01-01 PROCEDURE — 81003 URINALYSIS AUTO W/O SCOPE: CPT | Performed by: INTERNAL MEDICINE

## 2020-01-01 RX ORDER — BUMETANIDE 2 MG/1
2 TABLET ORAL 2 TIMES DAILY
Qty: 60 TABLET | Refills: 0 | Status: SHIPPED | OUTPATIENT
Start: 2020-01-01 | End: 2020-02-24

## 2020-01-01 RX ORDER — ATORVASTATIN CALCIUM 10 MG/1
10 TABLET, FILM COATED ORAL DAILY
Status: DISCONTINUED | OUTPATIENT
Start: 2020-01-01 | End: 2020-01-01 | Stop reason: HOSPADM

## 2020-01-01 RX ORDER — ONDANSETRON 2 MG/ML
4 INJECTION INTRAMUSCULAR; INTRAVENOUS EVERY 6 HOURS PRN
Status: DISCONTINUED | OUTPATIENT
Start: 2020-01-01 | End: 2020-01-01

## 2020-01-01 RX ORDER — CHLORHEXIDINE GLUCONATE 0.12 MG/ML
15 RINSE ORAL EVERY 12 HOURS SCHEDULED
Status: DISCONTINUED | OUTPATIENT
Start: 2020-01-01 | End: 2020-01-01

## 2020-01-01 RX ORDER — FENTANYL CITRATE 50 UG/ML
25 INJECTION, SOLUTION INTRAMUSCULAR; INTRAVENOUS ONCE
Status: COMPLETED | OUTPATIENT
Start: 2020-01-01 | End: 2020-01-01

## 2020-01-01 RX ORDER — IPRATROPIUM BROMIDE AND ALBUTEROL SULFATE 2.5; .5 MG/3ML; MG/3ML
3 SOLUTION RESPIRATORY (INHALATION)
Status: DISCONTINUED | OUTPATIENT
Start: 2020-01-01 | End: 2020-01-01

## 2020-01-01 RX ORDER — FUROSEMIDE 40 MG/1
40 TABLET ORAL DAILY
Status: DISCONTINUED | OUTPATIENT
Start: 2020-01-01 | End: 2020-01-01

## 2020-01-01 RX ORDER — AMIODARONE HCL/D5W 450 MG/250
0.5 PLASTIC BAG, INJECTION (ML) INTRAVENOUS CONTINUOUS
Status: DISCONTINUED | OUTPATIENT
Start: 2020-01-01 | End: 2020-01-01

## 2020-01-01 RX ORDER — MIDODRINE HYDROCHLORIDE 5 MG/1
5 TABLET ORAL
Status: DISCONTINUED | OUTPATIENT
Start: 2020-01-01 | End: 2020-01-01

## 2020-01-01 RX ORDER — BUMETANIDE 0.25 MG/ML
1 INJECTION INTRAMUSCULAR; INTRAVENOUS EVERY 8 HOURS
Status: DISCONTINUED | OUTPATIENT
Start: 2020-01-01 | End: 2020-01-01

## 2020-01-01 RX ORDER — MIDAZOLAM HYDROCHLORIDE 1 MG/ML
2 INJECTION INTRAMUSCULAR; INTRAVENOUS ONCE
Status: COMPLETED | OUTPATIENT
Start: 2020-01-01 | End: 2020-01-01

## 2020-01-01 RX ORDER — ATORVASTATIN CALCIUM 20 MG/1
20 TABLET, FILM COATED ORAL NIGHTLY
Status: DISCONTINUED | OUTPATIENT
Start: 2020-01-01 | End: 2020-01-01

## 2020-01-01 RX ORDER — AMIODARONE HCL/D5W 450 MG/250
1 PLASTIC BAG, INJECTION (ML) INTRAVENOUS CONTINUOUS
Status: DISPENSED | OUTPATIENT
Start: 2020-01-01 | End: 2020-01-01

## 2020-01-01 RX ORDER — AZITHROMYCIN 250 MG/1
500 TABLET, FILM COATED ORAL ONCE
Status: COMPLETED | OUTPATIENT
Start: 2020-01-01 | End: 2020-01-01

## 2020-01-01 RX ORDER — CARVEDILOL 3.12 MG/1
3.12 TABLET ORAL 2 TIMES DAILY
Status: DISCONTINUED | OUTPATIENT
Start: 2020-01-01 | End: 2020-01-01 | Stop reason: HOSPADM

## 2020-01-01 RX ORDER — SODIUM CHLORIDE 0.9 % (FLUSH) 0.9 %
10 SYRINGE (ML) INJECTION AS NEEDED
Status: DISCONTINUED | OUTPATIENT
Start: 2020-01-01 | End: 2020-01-01 | Stop reason: HOSPADM

## 2020-01-01 RX ORDER — AMIODARONE HYDROCHLORIDE 200 MG/1
200 TABLET ORAL DAILY
Status: DISCONTINUED | OUTPATIENT
Start: 2020-01-01 | End: 2020-01-01 | Stop reason: HOSPADM

## 2020-01-01 RX ORDER — ASPIRIN 81 MG/1
81 TABLET, CHEWABLE ORAL DAILY
Status: DISCONTINUED | OUTPATIENT
Start: 2020-01-01 | End: 2020-01-01

## 2020-01-01 RX ORDER — GLYCOPYRROLATE 0.2 MG/ML
0.2 INJECTION INTRAMUSCULAR; INTRAVENOUS
Status: DISCONTINUED | OUTPATIENT
Start: 2020-01-01 | End: 2020-01-01 | Stop reason: HOSPADM

## 2020-01-01 RX ORDER — SODIUM POLYSTYRENE SULFONATE 15 G/60ML
15 SUSPENSION ORAL; RECTAL ONCE
Status: COMPLETED | OUTPATIENT
Start: 2020-01-01 | End: 2020-01-01

## 2020-01-01 RX ORDER — GLYCOPYRROLATE 0.2 MG/ML
0.4 INJECTION INTRAMUSCULAR; INTRAVENOUS
Status: DISCONTINUED | OUTPATIENT
Start: 2020-01-01 | End: 2020-01-01 | Stop reason: HOSPADM

## 2020-01-01 RX ORDER — CALCIUM GLUCONATE 20 MG/ML
2 INJECTION, SOLUTION INTRAVENOUS ONCE
Status: COMPLETED | OUTPATIENT
Start: 2020-01-01 | End: 2020-01-01

## 2020-01-01 RX ORDER — NOREPINEPHRINE BIT/0.9 % NACL 8 MG/250ML
.02-.5 INFUSION BOTTLE (ML) INTRAVENOUS
Status: DISCONTINUED | OUTPATIENT
Start: 2020-01-01 | End: 2020-01-01

## 2020-01-01 RX ORDER — MIDODRINE HYDROCHLORIDE 5 MG/1
5 TABLET ORAL ONCE
Status: COMPLETED | OUTPATIENT
Start: 2020-01-01 | End: 2020-01-01

## 2020-01-01 RX ORDER — MIDAZOLAM HYDROCHLORIDE 1 MG/ML
2 INJECTION INTRAMUSCULAR; INTRAVENOUS
Status: DISCONTINUED | OUTPATIENT
Start: 2020-01-01 | End: 2020-01-01 | Stop reason: HOSPADM

## 2020-01-01 RX ORDER — ATORVASTATIN CALCIUM 10 MG/1
10 TABLET, FILM COATED ORAL DAILY
COMMUNITY

## 2020-01-01 RX ORDER — CALCIUM CHLORIDE 100 MG/ML
1 INJECTION INTRAVENOUS; INTRAVENTRICULAR ONCE
Status: COMPLETED | OUTPATIENT
Start: 2020-01-01 | End: 2020-01-01

## 2020-01-01 RX ORDER — ASPIRIN 81 MG/1
81 TABLET ORAL DAILY
Status: DISCONTINUED | OUTPATIENT
Start: 2020-01-01 | End: 2020-01-01 | Stop reason: HOSPADM

## 2020-01-01 RX ORDER — LEVOTHYROXINE SODIUM 0.05 MG/1
50 TABLET ORAL
Status: DISCONTINUED | OUTPATIENT
Start: 2020-01-01 | End: 2020-01-01 | Stop reason: HOSPADM

## 2020-01-01 RX ORDER — BUDESONIDE 0.5 MG/2ML
0.5 INHALANT ORAL
Status: DISCONTINUED | OUTPATIENT
Start: 2020-01-01 | End: 2020-01-01

## 2020-01-01 RX ORDER — CARVEDILOL 6.25 MG/1
12.5 TABLET ORAL 2 TIMES DAILY
Status: DISCONTINUED | OUTPATIENT
Start: 2020-01-01 | End: 2020-01-01

## 2020-01-01 RX ORDER — BUDESONIDE AND FORMOTEROL FUMARATE DIHYDRATE 160; 4.5 UG/1; UG/1
2 AEROSOL RESPIRATORY (INHALATION)
COMMUNITY

## 2020-01-01 RX ORDER — AMIODARONE HYDROCHLORIDE 200 MG/1
200 TABLET ORAL DAILY
COMMUNITY

## 2020-01-01 RX ORDER — AZITHROMYCIN 250 MG/1
250 TABLET, FILM COATED ORAL
Status: DISCONTINUED | OUTPATIENT
Start: 2020-01-01 | End: 2020-01-01

## 2020-01-01 RX ORDER — SODIUM CHLORIDE 9 MG/ML
75 INJECTION, SOLUTION INTRAVENOUS CONTINUOUS
Status: DISCONTINUED | OUTPATIENT
Start: 2020-01-01 | End: 2020-01-01

## 2020-01-01 RX ORDER — MIDAZOLAM HCL IN 0.9 % NACL/PF 1 MG/ML
1-10 PLASTIC BAG, INJECTION (ML) INTRAVENOUS
Status: DISCONTINUED | OUTPATIENT
Start: 2020-01-01 | End: 2020-01-01 | Stop reason: HOSPADM

## 2020-01-01 RX ORDER — CARVEDILOL 3.12 MG/1
3.12 TABLET ORAL 2 TIMES DAILY
Qty: 60 TABLET | Refills: 3 | Status: SHIPPED | OUTPATIENT
Start: 2020-01-01 | End: 2020-02-24

## 2020-01-01 RX ORDER — SODIUM CHLORIDE 9 MG/ML
100 INJECTION, SOLUTION INTRAVENOUS CONTINUOUS
Status: DISCONTINUED | OUTPATIENT
Start: 2020-01-01 | End: 2020-01-01

## 2020-01-01 RX ORDER — ONDANSETRON 2 MG/ML
4 INJECTION INTRAMUSCULAR; INTRAVENOUS ONCE
Status: COMPLETED | OUTPATIENT
Start: 2020-01-01 | End: 2020-01-01

## 2020-01-01 RX ORDER — FUROSEMIDE 40 MG/1
40 TABLET ORAL DAILY
COMMUNITY
End: 2020-01-01 | Stop reason: HOSPADM

## 2020-01-01 RX ORDER — FENTANYL CITRATE 50 UG/ML
INJECTION, SOLUTION INTRAMUSCULAR; INTRAVENOUS
Status: DISPENSED
Start: 2020-01-01 | End: 2020-01-01

## 2020-01-01 RX ORDER — BUMETANIDE 1 MG/1
2 TABLET ORAL
Status: DISCONTINUED | OUTPATIENT
Start: 2020-01-01 | End: 2020-01-01 | Stop reason: HOSPADM

## 2020-01-01 RX ORDER — ACETAMINOPHEN 160 MG/5ML
650 SOLUTION ORAL EVERY 4 HOURS PRN
Status: DISCONTINUED | OUTPATIENT
Start: 2020-01-01 | End: 2020-01-01 | Stop reason: HOSPADM

## 2020-01-01 RX ORDER — PROPOFOL 10 MG/ML
VIAL (ML) INTRAVENOUS
Status: COMPLETED
Start: 2020-01-01 | End: 2020-01-01

## 2020-01-01 RX ORDER — ETOMIDATE 2 MG/ML
10 INJECTION INTRAVENOUS ONCE
Status: COMPLETED | OUTPATIENT
Start: 2020-01-01 | End: 2020-01-01

## 2020-01-01 RX ORDER — BUDESONIDE AND FORMOTEROL FUMARATE DIHYDRATE 160; 4.5 UG/1; UG/1
2 AEROSOL RESPIRATORY (INHALATION)
Status: DISCONTINUED | OUTPATIENT
Start: 2020-01-01 | End: 2020-01-01 | Stop reason: HOSPADM

## 2020-01-01 RX ORDER — ASPIRIN 81 MG/1
324 TABLET, CHEWABLE ORAL ONCE
Status: COMPLETED | OUTPATIENT
Start: 2020-01-01 | End: 2020-01-01

## 2020-01-01 RX ORDER — DEXTROSE MONOHYDRATE 25 G/50ML
25 INJECTION, SOLUTION INTRAVENOUS
Status: DISCONTINUED | OUTPATIENT
Start: 2020-01-01 | End: 2020-01-01

## 2020-01-01 RX ORDER — BUDESONIDE AND FORMOTEROL FUMARATE DIHYDRATE 160; 4.5 UG/1; UG/1
2 AEROSOL RESPIRATORY (INHALATION)
Status: DISCONTINUED | OUTPATIENT
Start: 2020-01-01 | End: 2020-01-01

## 2020-01-01 RX ORDER — SPIRONOLACTONE 25 MG/1
25 TABLET ORAL DAILY
Status: DISCONTINUED | OUTPATIENT
Start: 2020-01-01 | End: 2020-01-01

## 2020-01-01 RX ORDER — MORPHINE SULFATE 4 MG/ML
4 INJECTION, SOLUTION INTRAMUSCULAR; INTRAVENOUS
Status: DISCONTINUED | OUTPATIENT
Start: 2020-01-01 | End: 2020-01-01 | Stop reason: HOSPADM

## 2020-01-01 RX ORDER — BUMETANIDE 0.25 MG/ML
2 INJECTION INTRAMUSCULAR; INTRAVENOUS ONCE
Status: COMPLETED | OUTPATIENT
Start: 2020-01-01 | End: 2020-01-01

## 2020-01-01 RX ORDER — NICOTINE POLACRILEX 4 MG
15 LOZENGE BUCCAL
Status: DISCONTINUED | OUTPATIENT
Start: 2020-01-01 | End: 2020-01-01

## 2020-01-01 RX ORDER — FUROSEMIDE 10 MG/ML
40 INJECTION INTRAMUSCULAR; INTRAVENOUS ONCE
Status: COMPLETED | OUTPATIENT
Start: 2020-01-01 | End: 2020-01-01

## 2020-01-01 RX ORDER — MIDAZOLAM HYDROCHLORIDE 1 MG/ML
INJECTION INTRAMUSCULAR; INTRAVENOUS
Status: DISPENSED
Start: 2020-01-01 | End: 2020-01-01

## 2020-01-01 RX ORDER — SODIUM CHLORIDE 9 MG/ML
125 INJECTION, SOLUTION INTRAVENOUS CONTINUOUS
Status: DISCONTINUED | OUTPATIENT
Start: 2020-01-01 | End: 2020-01-01

## 2020-01-01 RX ORDER — FEBUXOSTAT 40 MG/1
40 TABLET, FILM COATED ORAL DAILY
Qty: 30 TABLET | Refills: 5 | Status: SHIPPED | OUTPATIENT
Start: 2020-01-01 | End: 2020-02-25

## 2020-01-01 RX ORDER — BUMETANIDE 1 MG/1
2 TABLET ORAL
Status: DISCONTINUED | OUTPATIENT
Start: 2020-01-01 | End: 2020-01-01

## 2020-01-01 RX ORDER — LEVOTHYROXINE SODIUM 0.05 MG/1
50 TABLET ORAL DAILY
Qty: 30 TABLET | Refills: 3 | Status: SHIPPED | OUTPATIENT
Start: 2020-01-01 | End: 2020-02-24

## 2020-01-01 RX ORDER — LORAZEPAM 2 MG/ML
2 INJECTION INTRAMUSCULAR
Status: DISCONTINUED | OUTPATIENT
Start: 2020-01-01 | End: 2020-01-01 | Stop reason: HOSPADM

## 2020-01-01 RX ORDER — CARVEDILOL 12.5 MG/1
12.5 TABLET ORAL 2 TIMES DAILY
COMMUNITY
End: 2020-01-01 | Stop reason: HOSPADM

## 2020-01-01 RX ORDER — DOBUTAMINE HYDROCHLORIDE 100 MG/100ML
2.5 INJECTION INTRAVENOUS CONTINUOUS
Status: DISCONTINUED | OUTPATIENT
Start: 2020-01-01 | End: 2020-01-01

## 2020-01-01 RX ORDER — CARBOXYMETHYLCELLULOSE SODIUM 10 MG/ML
1 GEL OPHTHALMIC
Status: DISCONTINUED | OUTPATIENT
Start: 2020-01-01 | End: 2020-01-01 | Stop reason: HOSPADM

## 2020-01-01 RX ORDER — HEPARIN SODIUM 10000 [USP'U]/100ML
12 INJECTION, SOLUTION INTRAVENOUS
Status: DISCONTINUED | OUTPATIENT
Start: 2020-01-01 | End: 2020-01-01

## 2020-01-01 RX ORDER — FENTANYL CITRATE 50 UG/ML
50 INJECTION, SOLUTION INTRAMUSCULAR; INTRAVENOUS
Status: DISCONTINUED | OUTPATIENT
Start: 2020-01-01 | End: 2020-01-01

## 2020-01-01 RX ORDER — ALBUMIN (HUMAN) 12.5 G/50ML
25 SOLUTION INTRAVENOUS ONCE
Status: DISCONTINUED | OUTPATIENT
Start: 2020-01-01 | End: 2020-01-01

## 2020-01-01 RX ORDER — CALCIUM GLUCONATE 20 MG/ML
1 INJECTION, SOLUTION INTRAVENOUS ONCE
Status: COMPLETED | OUTPATIENT
Start: 2020-01-01 | End: 2020-01-01

## 2020-01-01 RX ORDER — FUROSEMIDE 40 MG/1
40 TABLET ORAL 2 TIMES DAILY
COMMUNITY
End: 2020-01-01 | Stop reason: HOSPADM

## 2020-01-01 RX ORDER — SODIUM CHLORIDE 0.9 % (FLUSH) 0.9 %
10 SYRINGE (ML) INJECTION EVERY 12 HOURS SCHEDULED
Status: DISCONTINUED | OUTPATIENT
Start: 2020-01-01 | End: 2020-01-01 | Stop reason: HOSPADM

## 2020-01-01 RX ORDER — ACETAMINOPHEN 650 MG/1
650 SUPPOSITORY RECTAL EVERY 4 HOURS PRN
Status: DISCONTINUED | OUTPATIENT
Start: 2020-01-01 | End: 2020-01-01 | Stop reason: HOSPADM

## 2020-01-01 RX ORDER — FENTANYL CITRATE 50 UG/ML
INJECTION, SOLUTION INTRAMUSCULAR; INTRAVENOUS
Status: COMPLETED
Start: 2020-01-01 | End: 2020-01-01

## 2020-01-01 RX ORDER — PANTOPRAZOLE SODIUM 40 MG/10ML
40 INJECTION, POWDER, LYOPHILIZED, FOR SOLUTION INTRAVENOUS EVERY MORNING
Status: DISCONTINUED | OUTPATIENT
Start: 2020-01-01 | End: 2020-01-01

## 2020-01-01 RX ORDER — IPRATROPIUM BROMIDE AND ALBUTEROL SULFATE 2.5; .5 MG/3ML; MG/3ML
3 SOLUTION RESPIRATORY (INHALATION) ONCE
Status: COMPLETED | OUTPATIENT
Start: 2020-01-01 | End: 2020-01-01

## 2020-01-01 RX ORDER — ACETAMINOPHEN 325 MG/1
650 TABLET ORAL EVERY 4 HOURS PRN
Status: DISCONTINUED | OUTPATIENT
Start: 2020-01-01 | End: 2020-01-01 | Stop reason: HOSPADM

## 2020-01-01 RX ORDER — DEXMEDETOMIDINE HYDROCHLORIDE 4 UG/ML
.2-1.5 INJECTION, SOLUTION INTRAVENOUS
Status: DISCONTINUED | OUTPATIENT
Start: 2020-01-01 | End: 2020-01-01 | Stop reason: HOSPADM

## 2020-01-01 RX ORDER — MIDODRINE HYDROCHLORIDE 5 MG/1
10 TABLET ORAL EVERY 8 HOURS SCHEDULED
Status: DISCONTINUED | OUTPATIENT
Start: 2020-01-01 | End: 2020-01-01

## 2020-01-01 RX ORDER — IPRATROPIUM BROMIDE AND ALBUTEROL SULFATE 2.5; .5 MG/3ML; MG/3ML
3 SOLUTION RESPIRATORY (INHALATION) EVERY 4 HOURS PRN
Status: DISCONTINUED | OUTPATIENT
Start: 2020-01-01 | End: 2020-01-01

## 2020-01-01 RX ORDER — HYDROMORPHONE HCL 110MG/55ML
2 PATIENT CONTROLLED ANALGESIA SYRINGE INTRAVENOUS
Status: DISCONTINUED | OUTPATIENT
Start: 2020-01-01 | End: 2020-01-01 | Stop reason: HOSPADM

## 2020-01-01 RX ORDER — ETOMIDATE 2 MG/ML
INJECTION INTRAVENOUS
Status: COMPLETED
Start: 2020-01-01 | End: 2020-01-01

## 2020-01-01 RX ORDER — FENTANYL CITRATE 50 UG/ML
50 INJECTION, SOLUTION INTRAMUSCULAR; INTRAVENOUS ONCE
Status: COMPLETED | OUTPATIENT
Start: 2020-01-01 | End: 2020-01-01

## 2020-01-01 RX ORDER — MIDAZOLAM HYDROCHLORIDE 1 MG/ML
2 INJECTION INTRAMUSCULAR; INTRAVENOUS
Status: DISCONTINUED | OUTPATIENT
Start: 2020-01-01 | End: 2020-01-01

## 2020-01-01 RX ORDER — CLOPIDOGREL BISULFATE 75 MG/1
75 TABLET ORAL DAILY
Status: DISCONTINUED | OUTPATIENT
Start: 2020-01-01 | End: 2020-01-01 | Stop reason: HOSPADM

## 2020-01-01 RX ORDER — ALBUTEROL SULFATE 2.5 MG/3ML
2.5 SOLUTION RESPIRATORY (INHALATION)
Status: COMPLETED | OUTPATIENT
Start: 2020-01-01 | End: 2020-01-01

## 2020-01-01 RX ORDER — HYDROMORPHONE HCL 110MG/55ML
1.5 PATIENT CONTROLLED ANALGESIA SYRINGE INTRAVENOUS
Status: DISCONTINUED | OUTPATIENT
Start: 2020-01-01 | End: 2020-01-01 | Stop reason: HOSPADM

## 2020-01-01 RX ORDER — CHOLECALCIFEROL (VITAMIN D3) 125 MCG
5 CAPSULE ORAL NIGHTLY PRN
Status: DISCONTINUED | OUTPATIENT
Start: 2020-01-01 | End: 2020-01-01 | Stop reason: HOSPADM

## 2020-01-01 RX ORDER — BUMETANIDE 0.25 MG/ML
1 INJECTION INTRAMUSCULAR; INTRAVENOUS EVERY 12 HOURS
Status: DISCONTINUED | OUTPATIENT
Start: 2020-01-01 | End: 2020-01-01

## 2020-01-01 RX ORDER — FEBUXOSTAT 40 MG/1
40 TABLET, FILM COATED ORAL DAILY
Status: DISCONTINUED | OUTPATIENT
Start: 2020-01-01 | End: 2020-01-01 | Stop reason: HOSPADM

## 2020-01-01 RX ORDER — DIPHENOXYLATE HYDROCHLORIDE AND ATROPINE SULFATE 2.5; .025 MG/1; MG/1
1 TABLET ORAL
Status: DISCONTINUED | OUTPATIENT
Start: 2020-01-01 | End: 2020-01-01 | Stop reason: HOSPADM

## 2020-01-01 RX ORDER — DEXTROSE MONOHYDRATE 25 G/50ML
50 INJECTION, SOLUTION INTRAVENOUS ONCE
Status: COMPLETED | OUTPATIENT
Start: 2020-01-01 | End: 2020-01-01

## 2020-01-01 RX ORDER — CLOPIDOGREL BISULFATE 75 MG/1
75 TABLET ORAL DAILY
COMMUNITY

## 2020-01-01 RX ADMIN — DEXTROSE 50 % IN WATER (D50W) INTRAVENOUS SYRINGE 50 ML: at 05:12

## 2020-01-01 RX ADMIN — CLOPIDOGREL BISULFATE 75 MG: 75 TABLET ORAL at 12:33

## 2020-01-01 RX ADMIN — Medication 1200 MG: at 08:31

## 2020-01-01 RX ADMIN — FENTANYL CITRATE 50 MCG: 50 INJECTION, SOLUTION INTRAMUSCULAR; INTRAVENOUS at 14:57

## 2020-01-01 RX ADMIN — ASPIRIN 81 MG: 81 TABLET, DELAYED RELEASE ORAL at 08:52

## 2020-01-01 RX ADMIN — IPRATROPIUM BROMIDE AND ALBUTEROL SULFATE 3 ML: .5; 3 SOLUTION RESPIRATORY (INHALATION) at 03:01

## 2020-01-01 RX ADMIN — SODIUM POLYSTYRENE SULFONATE 15 G: 15 SUSPENSION ORAL; RECTAL at 16:33

## 2020-01-01 RX ADMIN — AMIODARONE HYDROCHLORIDE 200 MG: 200 TABLET ORAL at 12:32

## 2020-01-01 RX ADMIN — MELATONIN TAB 5 MG 5 MG: 5 TAB at 23:20

## 2020-01-01 RX ADMIN — GLYCOPYRROLATE 0.4 MG: 0.2 INJECTION, SOLUTION INTRAMUSCULAR; INTRAVENOUS at 17:42

## 2020-01-01 RX ADMIN — INSULIN HUMAN 10 UNITS: 100 INJECTION, SOLUTION PARENTERAL at 05:12

## 2020-01-01 RX ADMIN — BUMETANIDE 2 MG: 0.25 INJECTION INTRAMUSCULAR; INTRAVENOUS at 07:58

## 2020-01-01 RX ADMIN — FENTANYL CITRATE 25 MCG: 50 INJECTION, SOLUTION INTRAMUSCULAR; INTRAVENOUS at 03:49

## 2020-01-01 RX ADMIN — CHLORHEXIDINE GLUCONATE 0.12% ORAL RINSE 15 ML: 1.2 LIQUID ORAL at 21:35

## 2020-01-01 RX ADMIN — CALCIUM GLUCONATE 1 G: 20 INJECTION, SOLUTION INTRAVENOUS at 08:53

## 2020-01-01 RX ADMIN — BUMETANIDE 1 MG: 0.25 INJECTION INTRAMUSCULAR; INTRAVENOUS at 18:01

## 2020-01-01 RX ADMIN — ASPIRIN 81 MG 81 MG: 81 TABLET ORAL at 11:47

## 2020-01-01 RX ADMIN — VANCOMYCIN HYDROCHLORIDE 1250 MG: 10 INJECTION, POWDER, LYOPHILIZED, FOR SOLUTION INTRAVENOUS at 20:43

## 2020-01-01 RX ADMIN — SODIUM BICARBONATE 50 MEQ: 84 INJECTION, SOLUTION INTRAVENOUS at 02:29

## 2020-01-01 RX ADMIN — BUMETANIDE 1 MG/HR: 0.25 INJECTION INTRAMUSCULAR; INTRAVENOUS at 07:58

## 2020-01-01 RX ADMIN — ENOXAPARIN SODIUM 30 MG: 30 INJECTION SUBCUTANEOUS at 18:01

## 2020-01-01 RX ADMIN — Medication 10 ML: at 12:34

## 2020-01-01 RX ADMIN — MIDODRINE HYDROCHLORIDE 5 MG: 5 TABLET ORAL at 18:38

## 2020-01-01 RX ADMIN — AMIODARONE HYDROCHLORIDE 0.5 MG/MIN: 50 INJECTION, SOLUTION INTRAVENOUS at 05:38

## 2020-01-01 RX ADMIN — DOBUTAMINE IN DEXTROSE 2.5 MCG/KG/MIN: 100 INJECTION, SOLUTION INTRAVENOUS at 14:39

## 2020-01-01 RX ADMIN — SULFUR HEXAFLUORIDE 2 ML: KIT at 09:00

## 2020-01-01 RX ADMIN — HEPARIN SODIUM AND DEXTROSE 10 UNITS/KG/HR: 10000; 5 INJECTION INTRAVENOUS at 11:03

## 2020-01-01 RX ADMIN — CHLORHEXIDINE GLUCONATE 0.12% ORAL RINSE 15 ML: 1.2 LIQUID ORAL at 07:55

## 2020-01-01 RX ADMIN — LEVOTHYROXINE SODIUM 50 MCG: 50 TABLET ORAL at 08:52

## 2020-01-01 RX ADMIN — ALBUTEROL SULFATE 2.5 MG: 2.5 SOLUTION RESPIRATORY (INHALATION) at 19:50

## 2020-01-01 RX ADMIN — DOBUTAMINE IN DEXTROSE 2.5 MCG/KG/MIN: 100 INJECTION, SOLUTION INTRAVENOUS at 16:32

## 2020-01-01 RX ADMIN — VASOPRESSIN 0.03 UNITS/MIN: 20 INJECTION INTRAVENOUS at 11:46

## 2020-01-01 RX ADMIN — CEFEPIME HYDROCHLORIDE 2 G: 2 INJECTION, POWDER, FOR SOLUTION INTRAVENOUS at 11:17

## 2020-01-01 RX ADMIN — MIDODRINE HYDROCHLORIDE 10 MG: 5 TABLET ORAL at 06:14

## 2020-01-01 RX ADMIN — PROPOFOL 30 MCG/KG/MIN: 10 INJECTION, EMULSION INTRAVENOUS at 04:45

## 2020-01-01 RX ADMIN — SODIUM CHLORIDE 125 ML/HR: 0.9 INJECTION, SOLUTION INTRAVENOUS at 09:25

## 2020-01-01 RX ADMIN — CLOPIDOGREL BISULFATE 75 MG: 75 TABLET ORAL at 08:52

## 2020-01-01 RX ADMIN — PHENYLEPHRINE HYDROCHLORIDE 6 MCG/KG/MIN: 10 INJECTION INTRAVENOUS at 10:39

## 2020-01-01 RX ADMIN — IPRATROPIUM BROMIDE AND ALBUTEROL SULFATE 3 ML: .5; 3 SOLUTION RESPIRATORY (INHALATION) at 20:00

## 2020-01-01 RX ADMIN — AMIODARONE HYDROCHLORIDE 200 MG: 200 TABLET ORAL at 08:52

## 2020-01-01 RX ADMIN — HYDROMORPHONE HYDROCHLORIDE 2 MG: 2 INJECTION, SOLUTION INTRAMUSCULAR; INTRAVENOUS; SUBCUTANEOUS at 17:41

## 2020-01-01 RX ADMIN — AZITHROMYCIN MONOHYDRATE 500 MG: 250 TABLET ORAL at 23:32

## 2020-01-01 RX ADMIN — MIDAZOLAM 2 MG: 1 INJECTION INTRAMUSCULAR; INTRAVENOUS at 08:32

## 2020-01-01 RX ADMIN — SODIUM BICARBONATE 50 MEQ: 84 INJECTION, SOLUTION INTRAVENOUS at 03:49

## 2020-01-01 RX ADMIN — ETOMIDATE 10 MG: 2 INJECTION INTRAVENOUS at 03:49

## 2020-01-01 RX ADMIN — SODIUM BICARBONATE 50 MEQ: 84 INJECTION, SOLUTION INTRAVENOUS at 16:32

## 2020-01-01 RX ADMIN — MIDODRINE HYDROCHLORIDE 10 MG: 5 TABLET ORAL at 21:34

## 2020-01-01 RX ADMIN — HEPARIN SODIUM AND DEXTROSE 11 UNITS/KG/HR: 10000; 5 INJECTION INTRAVENOUS at 21:20

## 2020-01-01 RX ADMIN — PHENYLEPHRINE HYDROCHLORIDE 6 MCG/KG/MIN: 10 INJECTION INTRAVENOUS at 11:46

## 2020-01-01 RX ADMIN — SODIUM CHLORIDE 100 ML/HR: 900 INJECTION, SOLUTION INTRAVENOUS at 15:01

## 2020-01-01 RX ADMIN — BUDESONIDE AND FORMOTEROL FUMARATE DIHYDRATE 2 PUFF: 160; 4.5 AEROSOL RESPIRATORY (INHALATION) at 19:50

## 2020-01-01 RX ADMIN — SPIRONOLACTONE 25 MG: 25 TABLET, FILM COATED ORAL at 12:33

## 2020-01-01 RX ADMIN — ATORVASTATIN CALCIUM 10 MG: 10 TABLET, FILM COATED ORAL at 08:52

## 2020-01-01 RX ADMIN — CEFEPIME HYDROCHLORIDE 2 G: 2 INJECTION, POWDER, FOR SOLUTION INTRAVENOUS at 20:43

## 2020-01-01 RX ADMIN — AZITHROMYCIN MONOHYDRATE 500 MG: 500 INJECTION, POWDER, LYOPHILIZED, FOR SOLUTION INTRAVENOUS at 00:30

## 2020-01-01 RX ADMIN — PHENYLEPHRINE HYDROCHLORIDE 6 MCG/KG/MIN: 10 INJECTION INTRAVENOUS at 13:41

## 2020-01-01 RX ADMIN — LORAZEPAM 2 MG: 2 INJECTION INTRAMUSCULAR; INTRAVENOUS at 17:43

## 2020-01-01 RX ADMIN — CHLORHEXIDINE GLUCONATE 0.12% ORAL RINSE 15 ML: 1.2 LIQUID ORAL at 08:31

## 2020-01-01 RX ADMIN — PHENYLEPHRINE HYDROCHLORIDE 6 MCG/KG/MIN: 10 INJECTION INTRAVENOUS at 06:32

## 2020-01-01 RX ADMIN — Medication 1200 MG: at 07:58

## 2020-01-01 RX ADMIN — Medication 10 ML: at 11:17

## 2020-01-01 RX ADMIN — INSULIN LISPRO 3 UNITS: 100 INJECTION, SOLUTION INTRAVENOUS; SUBCUTANEOUS at 00:39

## 2020-01-01 RX ADMIN — IPRATROPIUM BROMIDE AND ALBUTEROL SULFATE 3 ML: .5; 3 SOLUTION RESPIRATORY (INHALATION) at 18:39

## 2020-01-01 RX ADMIN — VASOPRESSIN 0.03 UNITS/MIN: 20 INJECTION INTRAVENOUS at 21:24

## 2020-01-01 RX ADMIN — DOBUTAMINE IN DEXTROSE 5 MCG/KG/MIN: 100 INJECTION, SOLUTION INTRAVENOUS at 02:28

## 2020-01-01 RX ADMIN — CARVEDILOL 12.5 MG: 6.25 TABLET, FILM COATED ORAL at 12:32

## 2020-01-01 RX ADMIN — FENTANYL CITRATE 50 MCG: 50 INJECTION, SOLUTION INTRAMUSCULAR; INTRAVENOUS at 16:37

## 2020-01-01 RX ADMIN — CALCIUM CHLORIDE 1 G: 100 INJECTION INTRAVENOUS; INTRAVENTRICULAR at 03:48

## 2020-01-01 RX ADMIN — ETOMIDATE 10 MG: 40 INJECTION, SOLUTION INTRAVENOUS at 03:49

## 2020-01-01 RX ADMIN — ASPIRIN 81 MG 324 MG: 81 TABLET ORAL at 23:44

## 2020-01-01 RX ADMIN — AMIODARONE HYDROCHLORIDE 0.5 MG/MIN: 50 INJECTION, SOLUTION INTRAVENOUS at 14:26

## 2020-01-01 RX ADMIN — VASOPRESSIN 0.03 UNITS/MIN: 20 INJECTION INTRAVENOUS at 23:47

## 2020-01-01 RX ADMIN — FUROSEMIDE 40 MG: 10 INJECTION, SOLUTION INTRAMUSCULAR; INTRAVENOUS at 01:26

## 2020-01-01 RX ADMIN — CHLOROTHIAZIDE SODIUM 250 MG: 500 INJECTION, POWDER, LYOPHILIZED, FOR SOLUTION INTRAVENOUS at 08:53

## 2020-01-01 RX ADMIN — CEFTRIAXONE SODIUM 1 G: 1 INJECTION, POWDER, FOR SOLUTION INTRAMUSCULAR; INTRAVENOUS at 20:38

## 2020-01-01 RX ADMIN — CEFEPIME HYDROCHLORIDE 2 G: 2 INJECTION, POWDER, FOR SOLUTION INTRAVENOUS at 00:23

## 2020-01-01 RX ADMIN — ONDANSETRON 4 MG: 2 INJECTION INTRAMUSCULAR; INTRAVENOUS at 19:55

## 2020-01-01 RX ADMIN — IPRATROPIUM BROMIDE AND ALBUTEROL SULFATE 3 ML: .5; 3 SOLUTION RESPIRATORY (INHALATION) at 07:01

## 2020-01-01 RX ADMIN — BUDESONIDE AND FORMOTEROL FUMARATE DIHYDRATE 2 PUFF: 160; 4.5 AEROSOL RESPIRATORY (INHALATION) at 19:32

## 2020-01-01 RX ADMIN — NOREPINEPHRINE BITARTRATE 0.02 MCG/KG/MIN: 1 INJECTION, SOLUTION, CONCENTRATE INTRAVENOUS at 00:52

## 2020-01-01 RX ADMIN — VASOPRESSIN 0.03 UNITS/MIN: 20 INJECTION INTRAVENOUS at 08:59

## 2020-01-01 RX ADMIN — IPRATROPIUM BROMIDE AND ALBUTEROL SULFATE 3 ML: .5; 3 SOLUTION RESPIRATORY (INHALATION) at 14:46

## 2020-01-01 RX ADMIN — BUDESONIDE 0.5 MG: 0.5 INHALANT RESPIRATORY (INHALATION) at 07:18

## 2020-01-01 RX ADMIN — ONDANSETRON 4 MG: 2 INJECTION INTRAMUSCULAR; INTRAVENOUS at 12:16

## 2020-01-01 RX ADMIN — BUDESONIDE 0.5 MG: 0.5 INHALANT RESPIRATORY (INHALATION) at 07:01

## 2020-01-01 RX ADMIN — SODIUM BICARBONATE 50 MEQ: 84 INJECTION, SOLUTION INTRAVENOUS at 05:12

## 2020-01-01 RX ADMIN — IPRATROPIUM BROMIDE AND ALBUTEROL SULFATE 3 ML: .5; 3 SOLUTION RESPIRATORY (INHALATION) at 23:14

## 2020-01-01 RX ADMIN — ATORVASTATIN CALCIUM 10 MG: 10 TABLET, FILM COATED ORAL at 12:32

## 2020-01-01 RX ADMIN — BUMETANIDE 1 MG: 0.25 INJECTION INTRAMUSCULAR; INTRAVENOUS at 06:18

## 2020-01-01 RX ADMIN — ALBUTEROL SULFATE 2.5 MG: 2.5 SOLUTION RESPIRATORY (INHALATION) at 19:55

## 2020-01-01 RX ADMIN — MIDAZOLAM HYDROCHLORIDE 1 MG/HR: 5 INJECTION, SOLUTION INTRAMUSCULAR; INTRAVENOUS at 18:23

## 2020-01-01 RX ADMIN — MIDAZOLAM 2 MG: 1 INJECTION INTRAMUSCULAR; INTRAVENOUS at 11:00

## 2020-01-01 RX ADMIN — IPRATROPIUM BROMIDE AND ALBUTEROL SULFATE 3 ML: .5; 3 SOLUTION RESPIRATORY (INHALATION) at 07:18

## 2020-01-01 RX ADMIN — HEPARIN SODIUM AND DEXTROSE 12 UNITS/KG/HR: 10000; 5 INJECTION INTRAVENOUS at 20:55

## 2020-01-01 RX ADMIN — IPRATROPIUM BROMIDE AND ALBUTEROL SULFATE 3 ML: .5; 3 SOLUTION RESPIRATORY (INHALATION) at 11:11

## 2020-01-01 RX ADMIN — MIDODRINE HYDROCHLORIDE 5 MG: 5 TABLET ORAL at 00:23

## 2020-01-01 RX ADMIN — BUDESONIDE 0.5 MG: 0.5 INHALANT RESPIRATORY (INHALATION) at 18:39

## 2020-01-01 RX ADMIN — MIDAZOLAM 2 MG: 1 INJECTION INTRAMUSCULAR; INTRAVENOUS at 16:02

## 2020-01-01 RX ADMIN — FUROSEMIDE 40 MG: 40 TABLET ORAL at 12:32

## 2020-01-01 RX ADMIN — PHENYLEPHRINE HYDROCHLORIDE 0.5 MCG/KG/MIN: 10 INJECTION INTRAVENOUS at 02:38

## 2020-01-01 RX ADMIN — ASPIRIN 81 MG: 81 TABLET, DELAYED RELEASE ORAL at 12:32

## 2020-01-01 RX ADMIN — AMIODARONE HYDROCHLORIDE 1 MG/MIN: 50 INJECTION, SOLUTION INTRAVENOUS at 16:32

## 2020-01-01 RX ADMIN — MIDODRINE HYDROCHLORIDE 5 MG: 5 TABLET ORAL at 07:58

## 2020-01-01 RX ADMIN — MIDAZOLAM 2 MG: 1 INJECTION INTRAMUSCULAR; INTRAVENOUS at 05:30

## 2020-01-01 RX ADMIN — Medication 1200 MG: at 21:34

## 2020-01-01 RX ADMIN — DOBUTAMINE IN DEXTROSE 2.5 MCG/KG/MIN: 100 INJECTION, SOLUTION INTRAVENOUS at 05:12

## 2020-01-01 RX ADMIN — SODIUM CHLORIDE 500 ML: 0.9 INJECTION, SOLUTION INTRAVENOUS at 09:30

## 2020-01-01 RX ADMIN — FENTANYL CITRATE 50 MCG: 50 INJECTION, SOLUTION INTRAMUSCULAR; INTRAVENOUS at 18:22

## 2020-01-01 RX ADMIN — AMIODARONE HYDROCHLORIDE 0.5 MG/MIN: 50 INJECTION, SOLUTION INTRAVENOUS at 21:22

## 2020-01-01 RX ADMIN — IPRATROPIUM BROMIDE AND ALBUTEROL SULFATE 3 ML: .5; 3 SOLUTION RESPIRATORY (INHALATION) at 11:18

## 2020-01-01 RX ADMIN — CARVEDILOL 3.12 MG: 3.12 TABLET, FILM COATED ORAL at 08:52

## 2020-01-01 RX ADMIN — CALCIUM GLUCONATE 2 G: 20 INJECTION, SOLUTION INTRAVENOUS at 02:33

## 2020-01-01 RX ADMIN — Medication 10 ML: at 20:40

## 2020-01-01 RX ADMIN — SODIUM BICARBONATE 50 MEQ: 84 INJECTION, SOLUTION INTRAVENOUS at 03:52

## 2020-01-01 RX ADMIN — FEBUXOSTAT 40 MG: 40 TABLET ORAL at 08:53

## 2020-01-01 RX ADMIN — PANTOPRAZOLE SODIUM 40 MG: 40 INJECTION, POWDER, FOR SOLUTION INTRAVENOUS at 11:48

## 2020-01-01 RX ADMIN — CARVEDILOL 3.12 MG: 3.12 TABLET, FILM COATED ORAL at 20:40

## 2020-01-01 RX ADMIN — MIDODRINE HYDROCHLORIDE 5 MG: 5 TABLET ORAL at 11:04

## 2020-01-01 RX ADMIN — BUDESONIDE AND FORMOTEROL FUMARATE DIHYDRATE 2 PUFF: 160; 4.5 AEROSOL RESPIRATORY (INHALATION) at 07:45

## 2020-01-01 RX ADMIN — Medication 1200 MG: at 20:38

## 2020-01-01 RX ADMIN — NOREPINEPHRINE BITARTRATE 0.12 MCG/KG/MIN: 1 INJECTION, SOLUTION, CONCENTRATE INTRAVENOUS at 21:30

## 2020-01-01 RX ADMIN — FENTANYL CITRATE 50 MCG: 50 INJECTION, SOLUTION INTRAMUSCULAR; INTRAVENOUS at 04:44

## 2020-01-01 RX ADMIN — MIDAZOLAM 2 MG: 1 INJECTION INTRAMUSCULAR; INTRAVENOUS at 13:32

## 2020-01-01 RX ADMIN — ONDANSETRON 4 MG: 2 INJECTION INTRAMUSCULAR; INTRAVENOUS at 00:24

## 2020-01-01 RX ADMIN — NOREPINEPHRINE BITARTRATE 0.35 MCG/KG/MIN: 1 INJECTION, SOLUTION, CONCENTRATE INTRAVENOUS at 11:46

## 2020-01-01 RX ADMIN — CEFTRIAXONE SODIUM 1 G: 1 INJECTION, POWDER, FOR SOLUTION INTRAMUSCULAR; INTRAVENOUS at 20:11

## 2020-01-01 RX ADMIN — MIDODRINE HYDROCHLORIDE 10 MG: 5 TABLET ORAL at 14:39

## 2020-01-01 RX ADMIN — NITROGLYCERIN 1 INCH: 20 OINTMENT TOPICAL at 23:44

## 2020-01-01 RX ADMIN — DEXMEDETOMIDINE 0.3 MCG/KG/HR: 100 INJECTION, SOLUTION, CONCENTRATE INTRAVENOUS at 04:24

## 2020-01-07 PROBLEM — Z95.1 HX OF CABG: Status: ACTIVE | Noted: 2020-01-01

## 2020-01-07 PROBLEM — Z95.820 STATUS POST ANGIOPLASTY WITH STENT: Status: ACTIVE | Noted: 2020-01-01

## 2020-01-07 PROBLEM — I48.19 PERSISTENT ATRIAL FIBRILLATION (HCC): Status: ACTIVE | Noted: 2020-01-01

## 2020-01-07 NOTE — PROGRESS NOTES
Encounter Date:01/07/2020  Last seen 9/9/2019      Patient ID: Luis E Rahman is a 79 y.o. male.    Chief Complaint:  Ischemic cardiomyopathy  Persistent atrial fibrillation  Status post ICD  Status post CABG  Dyslipidemia  Hypertension  CKD 4      History of Present Illness    Since I have last seen, the patient has been without any chest discomfort ,shortness of breath, palpitations, dizziness or syncope.  Denies having any headache ,abdominal pain ,nausea, vomiting , diarrhea constipation, loss of weight or loss of appetite.  Denies having any hematuria or blood in the stool.    Excessive bruising.  Lower extremity swelling.    Review of all systems negative except as indicated    Denies having any ICD shocks.      Assessment and Plan       ////    Impression  ===   - ischemic cardiomyopathy.       - status post single-chamber ICD 03/05/2010.  Have repositioning of the ventricular lead 07/09/2010.  Status post ICD shocks for ventricular tachycardia.  Patient is doing better with amiodarone and diuresis.  VT zone was reduced to 142 beats per minute     Status post ICD pulse generator replacement 9/6/2019.  Revision of pocket and repositioning of the pulse generator more medially and superiorly.        - status post successful ICD shock for ventricular tachycardia 09/24/2018      -history of ICD shock with successful defibrillation.  04/07/2017. Status post ICD shock  x2 for sustained ventricular tachycardia 02/05/2018.  Unsuccessful at 25 joules followed by successful 35 joules shock.Patient was asymptomatic.     Echocardiogram showed left ventricular dysfunction with ejection fraction of 40%.  Left atrial enlargement is present.  10/10/2018.  Michelle scan Cardiolite test is abnormal with inferior infarction and significant poster lateral ischemia.      -status post CABG 01/2000.    Status post stent to SVG to RCA 10/17/2018   cardiac catheterization 10/17/2018.  LV-gram not performed due to renal dysfunction.   Total LAD and RCA. .  Marginal branch has 30% disease.  Villarreal is atretic.  SVG to marginal branch is patent.  SVG to diagonal branch is patent and is filling LAD also.  SVG to PDA had 95% disease just distal to the landing site and had intervention.      - dizziness  Probably due to low blood pressure.  -improved with reduce dose of lisinopril.     - compensated congestive heart failure     - history of atrial fibrillation.  Patient has converted and was maintaining sinus rhythm.  Patient is in atrial fibrillation     -status post left iliofemoral thrombectomy and endarterectomy 04/01/2017.  Status post stent to right common iliac artery and left external iliac artery 04/04/2017 P     -dyslipidemia hypertension and history of smoking.  Renal dysfunctionBUN 17 creatinine 1.9     -status post hemorrhoidectomy and hernia repair     -status post impending inferior myocardial infarction prior to RCA stent placement 1997.  ==  Plan  ========  EKG showed atrial fibrillation  Patient did not have any ICD shocks.  Patient is not having any angina pectoris or congestive heart failure.  Medications were reviewed and updated.  Patient is on Plavix.  Interrogation of the ICD reveal excellent pacing parameters.  Patient is in persistent atrial fibrillation.  I had a lengthy discussion with the patient and family regarding anticoagulation.  Risks and benefits pros and cons were discussed.  Patient is not anxious to start on anticoagulation since he has excessive bruising already.  Patient's ventricular rate with atrial fibrillation is faster.  Increase carvedilol to 12.5 mg p.o. twice daily or 1-1/2 tablets twice a day.  Hopefully rate is better controlled.  Nephrologist would prefer him to have less diuretic due to renal dysfunction.  Follow-up in the office in 6 months with ICD interrogation.  Further plan will depend on patient's progress.  [[[[[[[[[[[[[           Diagnosis Plan   1. Ischemic cardiomyopathy     2. Presence of  automatic cardioverter/defibrillator (AICD)     3. Systolic congestive heart failure, unspecified HF chronicity (CMS/HCC)     4. Persistent atrial fibrillation     LAB RESULTS (LAST 7 DAYS)    CBC        BMP        CMP         BNP        TROPONIN        CoAg        Creatinine Clearance  Estimated Creatinine Clearance: 32.3 mL/min (A) (by C-G formula based on SCr of 1.71 mg/dL (H)).    ABG        Radiology  No radiology results for the last day                The following portions of the patient's history were reviewed and updated as appropriate: allergies, current medications, past family history, past medical history, past social history, past surgical history and problem list.    Review of Systems   Constitution: Negative for malaise/fatigue.   Cardiovascular: Positive for leg swelling (both feet). Negative for chest pain, palpitations and syncope.   Respiratory: Positive for shortness of breath.    Skin: Negative for rash.   Gastrointestinal: Negative for nausea and vomiting.   Neurological: Negative for dizziness, light-headedness and numbness.         Current Outpatient Medications:   •  amiodarone (PACERONE) 200 MG tablet, TAKE 1 TABLET BY MOUTH EVERY DAY, Disp: 90 tablet, Rfl: 2  •  aspirin (ASPIR-LOW) 81 MG EC tablet, ASPIR-LOW 81 MG TBEC, Disp: , Rfl:   •  atorvastatin (LIPITOR) 10 MG tablet, TAKE ONE TABLET BY MOUTH AT BEDTIME, Disp: 90 tablet, Rfl: 2  •  carvedilol (COREG) 12.5 MG tablet, TAKE 1 TABLET BY MOUTH TWICE DAILY, Disp: 180 tablet, Rfl: 1  •  clopidogrel (PLAVIX) 75 MG tablet, TAKE ONE TABLET BY MOUTH EVERY DAY, Disp: 90 tablet, Rfl: 0  •  FEROSUL 325 (65 Fe) MG tablet, , Disp: , Rfl:   •  furosemide (LASIX) 40 MG tablet, TAKE 1 TABLET BY MOUTH TWICE DAILY (Patient taking differently: Take 40 mg by mouth Daily.), Disp: 180 tablet, Rfl: 0  •  spironolactone (ALDACTONE) 25 MG tablet, Daily., Disp: , Rfl:   •  vitamin D (ERGOCALCIFEROL) 76730 units capsule capsule, , Disp: , Rfl:   •  albuterol  sulfate HFA (VENTOLIN HFA) 108 (90 Base) MCG/ACT inhaler, VENTOLIN  (90 Base) MCG/ACT AERS, Disp: , Rfl:   •  B Complex Vitamins (VITAMIN B COMPLEX PO), VITAMIN B COMPLEX TABS, Disp: , Rfl:   •  raNITIdine (ZANTAC) 150 MG tablet, TAKE 1 TABLET BY MOUTH 2 TIMES EVERY DAY **DISCONTINUE NEXIUM, Disp: 60 tablet, Rfl: 3    No Known Allergies    History reviewed. No pertinent family history.    Past Surgical History:   Procedure Laterality Date   • CARDIAC CATHETERIZATION     • CARDIAC ELECTROPHYSIOLOGY PROCEDURE N/A 9/6/2019    Procedure: ICD DC generator change;  Surgeon: Amalia Leal MD;  Location:  [facility];  Service: Cardiovascular   • CARDIAC ELECTROPHYSIOLOGY PROCEDURE N/A 9/6/2019    Procedure: Pocket Revision;  Surgeon: Amalia Leal MD;  Location:  [facility];  Service: Cardiovascular   • CORONARY ANGIOPLASTY WITH STENT PLACEMENT     • CORONARY ARTERY BYPASS GRAFT     • PACEMAKER REPLACEMENT         Past Medical History:   Diagnosis Date   • Atrial fibrillation (CMS/HCC)    • CHF (congestive heart failure) (CMS/HCC)    • Hyperlipidemia    • Hypertension    • Myocardial infarction (CMS/HCC)        History reviewed. No pertinent family history.    Social History     Socioeconomic History   • Marital status: Single     Spouse name: Not on file   • Number of children: Not on file   • Years of education: Not on file   • Highest education level: Not on file   Tobacco Use   • Smoking status: Current Every Day Smoker     Packs/day: 0.50     Types: Cigarettes   • Smokeless tobacco: Never Used   Substance and Sexual Activity   • Alcohol use: No     Frequency: Never           ECG 12 Lead  Date/Time: 1/7/2020 4:01 PM  Performed by: Amalia Leal MD  Authorized by: Amalia Leal MD   Comparison: compared with previous ECG   Similar to previous ECG  Comments: Atrial fibrillation with controlled ventricular response 95/min right axis deviation nonspecific ST-T wave  "changes no ectopy compared to 9/19/2019 no significant changes seen              Objective:       Physical Exam    /76   Pulse 100   Ht 165.1 cm (65\")   Wt 76.1 kg (167 lb 12 oz)   SpO2 99%   BMI 27.92 kg/m²   The patient is alert, oriented and in no distress.    Vital signs as noted above.    Head and neck revealed no carotid bruits or jugular venous distension.  No thyromegaly or lymphadenopathy is present.    Lungs clear.  No wheezing.  Breath sounds are normal bilaterally.    Heart normal first and second heart sounds.  No murmur..  No pericardial rub is present.  No gallop is present.    Abdomen soft and nontender.  No organomegaly is present.    Extremities revealed good peripheral pulses.  2+ edema    Skin warm and dry.  ICD site looks normal.    Musculoskeletal system is grossly normal.    CNS grossly normal.        "

## 2020-01-24 PROBLEM — R07.9 CHEST PAIN: Status: ACTIVE | Noted: 2020-01-01

## 2020-01-24 NOTE — ED PROVIDER NOTES
Subjective   80-year-old male with a history of coronary artery disease presents with chest pain.  Patient states the pain started this afternoon and has been constant.  Pain is in the center of his chest without radiation.  He has had associated shortness of breath.  He denies any diaphoresis, dizziness, palpitations, nausea.  He denies any alleviating or exacerbating factors.  Pain is described as moderate in intensity.      History provided by:  Patient      Review of Systems   Constitutional: Negative for fatigue and fever.   HENT: Negative for congestion and sore throat.    Eyes: Negative for pain and redness.   Respiratory: Positive for shortness of breath. Negative for cough.    Cardiovascular: Positive for chest pain. Negative for palpitations.   Gastrointestinal: Negative for abdominal pain and vomiting.   Genitourinary: Negative for dysuria and frequency.   Musculoskeletal: Negative for back pain.   Skin: Negative for rash.   Neurological: Negative for dizziness and headaches.   Psychiatric/Behavioral: Negative for behavioral problems and confusion.       Past Medical History:   Diagnosis Date   • Atrial fibrillation (CMS/[facility])    • CHF (congestive heart failure) (CMS/[facility])    • Hyperlipidemia    • Hypertension    • Myocardial infarction (CMS/[facility])        No Known Allergies    Past Surgical History:   Procedure Laterality Date   • CARDIAC CATHETERIZATION     • CARDIAC ELECTROPHYSIOLOGY PROCEDURE N/A 9/6/2019    Procedure: ICD DC generator change;  Surgeon: Amalia Leal MD;  Location: [facility] CATH INVASIVE LOCATION;  Service: Cardiovascular   • CARDIAC ELECTROPHYSIOLOGY PROCEDURE N/A 9/6/2019    Procedure: Pocket Revision;  Surgeon: Amalia Leal MD;  Location: [facility] CATH INVASIVE LOCATION;  Service: Cardiovascular   • CORONARY ANGIOPLASTY WITH STENT PLACEMENT     • CORONARY ARTERY BYPASS GRAFT     • PACEMAKER REPLACEMENT         No family history on file.    Social History     Socioeconomic History    • Marital status: Single     Spouse name: Not on file   • Number of children: Not on file   • Years of education: Not on file   • Highest education level: Not on file   Tobacco Use   • Smoking status: Current Every Day Smoker     Packs/day: 0.50     Types: Cigarettes   • Smokeless tobacco: Never Used   Substance and Sexual Activity   • Alcohol use: No     Frequency: Never           Objective   Physical Exam   Constitutional: He is oriented to person, place, and time. He appears well-developed and well-nourished.   HENT:   Head: Normocephalic and atraumatic.   Eyes: Pupils are equal, round, and reactive to light. EOM are normal.   Neck: Normal range of motion. Neck supple.   Cardiovascular: Normal heart sounds. An irregularly irregular rhythm present. Tachycardia present.   Pulmonary/Chest: Effort normal and breath sounds normal. No respiratory distress.   Abdominal: Soft. Bowel sounds are normal. There is no tenderness.   Musculoskeletal:        Right lower leg: Normal.        Left lower leg: Normal.   Neurological: He is alert and oriented to person, place, and time.   Skin: Skin is warm and dry.   Nursing note and vitals reviewed.      Procedures           ED Course      My interpretation of EKG shows atrial fibrillation, rate of 116, right bundle branch block, left posterior fascicular block, no ST elevation     Results for orders placed or performed during the hospital encounter of 01/23/20   Basic Metabolic Panel   Result Value Ref Range    Glucose 124 (H) 65 - 99 mg/dL    BUN 49 (H) 8 - 23 mg/dL    Creatinine 2.67 (H) 0.76 - 1.27 mg/dL    Sodium 141 136 - 145 mmol/L    Potassium 4.7 3.5 - 5.2 mmol/L    Chloride 98 98 - 107 mmol/L    CO2 29.0 22.0 - 29.0 mmol/L    Calcium 9.4 8.6 - 10.5 mg/dL    eGFR Non African Amer 23 (L) >60 mL/min/1.73    BUN/Creatinine Ratio 18.4 7.0 - 25.0    Anion Gap 14.0 5.0 - 15.0 mmol/L   Protime-INR   Result Value Ref Range    Protime 12.2 (H) 9.6 - 11.7 Seconds    INR 1.19 (H)  0.90 - 1.10   aPTT   Result Value Ref Range    PTT 22.4 (L) 24.0 - 31.0 seconds   Troponin   Result Value Ref Range    Troponin T 0.059 (C) 0.000 - 0.030 ng/mL   BNP   Result Value Ref Range    proBNP 34,244.0 (H) 5.0-1,800.0 pg/mL   CBC Auto Differential   Result Value Ref Range    WBC 8.60 3.40 - 10.80 10*3/mm3    RBC 4.42 4.14 - 5.80 10*6/mm3    Hemoglobin 15.4 13.0 - 17.7 g/dL    Hematocrit 46.2 37.5 - 51.0 %    .7 (H) 79.0 - 97.0 fL    MCH 34.9 (H) 26.6 - 33.0 pg    MCHC 33.4 31.5 - 35.7 g/dL    RDW 15.4 12.3 - 15.4 %    RDW-SD 57.3 (H) 37.0 - 54.0 fl    MPV 10.0 6.0 - 12.0 fL    Platelets 135 (L) 140 - 450 10*3/mm3    Neutrophil % 84.2 (H) 42.7 - 76.0 %    Lymphocyte % 7.6 (L) 19.6 - 45.3 %    Monocyte % 6.9 5.0 - 12.0 %    Eosinophil % 0.9 0.3 - 6.2 %    Basophil % 0.4 0.0 - 1.5 %    Neutrophils, Absolute 7.30 (H) 1.70 - 7.00 10*3/mm3    Lymphocytes, Absolute 0.70 0.70 - 3.10 10*3/mm3    Monocytes, Absolute 0.60 0.10 - 0.90 10*3/mm3    Eosinophils, Absolute 0.10 0.00 - 0.40 10*3/mm3    Basophils, Absolute 0.00 0.00 - 0.20 10*3/mm3    nRBC 0.4 (H) 0.0 - 0.2 /100 WBC   Lactic Acid, Reflex Timer (This will reflex a repeat order 3-3:15 hours after ordered.)   Result Value Ref Range    Extra Tube Hold for add-ons.    Lactic Acid, Reflex   Result Value Ref Range    Lactate 1.4 0.5 - 2.0 mmol/L   POC Lactate   Result Value Ref Range    Lactate 2.1 (C) 0.5 - 2.0 mmol/L   Gold Top - SST   Result Value Ref Range    Extra Tube Hold for add-ons.          Chest x-ray shows cardiomegaly with pulmonary vascular congestion, pending radiology review.                                    MDM   Patient had the above evaluation.  Results were discussed with the patient.  Patient remained well-appearing in the emergency room.  Chest x-ray is showing cardiomegaly with pulmonary vascular congestion.  BNP is 34,000.  Troponin is borderline elevated at 0.059.  EKG shows no acute ischemia.  Patient was found to have acute on  chronic kidney disease.  Creatinine is 2.67 with most recent creatinine being 1.71 in December.  Patient did meet sepsis criteria and sepsis protocol was initiated.  White blood cell count was normal.  Lactic acid was borderline elevated at 2.1.  Blood cultures were obtained.  Patient was given a dose of cefepime.  Patient will be admitted to Dr. Haines.      Final diagnoses:   Chest pain, unspecified type   Dyspnea, unspecified type   Acute on chronic congestive heart failure, unspecified heart failure type (CMS/HCC)   Acute kidney injury superimposed on chronic kidney disease (CMS/HCC)   Elevated troponin            Satinder Campbell MD  01/24/20 0605

## 2020-01-24 NOTE — THERAPY DISCHARGE NOTE
Patient Name: Luis E Rahman  : 1940    MRN: [id number]                              Today's Date: 2020       Admit Date: 2020    Visit Dx:     ICD-10-CM ICD-9-CM   1. Chest pain, unspecified type R07.9 786.50   2. Dyspnea, unspecified type R06.00 786.09   3. Acute on chronic congestive heart failure, unspecified heart failure type (CMS/HCC) I50.9 428.0   4. Acute kidney injury superimposed on chronic kidney disease (CMS/HCC) N17.9 866.00    N18.9 585.9   5. Elevated troponin R79.89 790.6     Patient Active Problem List   Diagnosis   • Presence of automatic cardioverter/defibrillator (AICD)   • Ischemic cardiomyopathy   • Congestive heart failure (CMS/[facility])   • Status post angioplasty with stent   • Persistent atrial fibrillation   • Hx of CABG   • Chest pain     Past Medical History:   Diagnosis Date   • Atrial fibrillation (CMS/[facility])    • CHF (congestive heart failure) (CMS/[facility])    • Hyperlipidemia    • Hypertension    • Myocardial infarction (CMS/[facility])      Past Surgical History:   Procedure Laterality Date   • CARDIAC CATHETERIZATION     • CARDIAC ELECTROPHYSIOLOGY PROCEDURE N/A 2019    Procedure: ICD DC generator change;  Surgeon: Amalia Leal MD;  Location: [facility] CATH INVASIVE LOCATION;  Service: Cardiovascular   • CARDIAC ELECTROPHYSIOLOGY PROCEDURE N/A 2019    Procedure: Pocket Revision;  Surgeon: Amalia Leal MD;  Location: [facility] CATH INVASIVE LOCATION;  Service: Cardiovascular   • CORONARY ANGIOPLASTY WITH STENT PLACEMENT     • CORONARY ARTERY BYPASS GRAFT     • PACEMAKER REPLACEMENT       General Information     Row Name 20 1406          PT Evaluation Time/Intention    Document Type  evaluation  -HD     Mode of Treatment  physical therapy  -HD     Row Name 20 1406          General Information    Patient Profile Reviewed?  yes  -HD     Prior Level of Function  independent: pt has RW and cane   -HD     Row Name 20 1406          Relationship/Environment     Lives With  alone  -HD     Row Name 01/24/20 1406          Resource/Environmental Concerns    Current Living Arrangements  home/apartment/condo  -HD     Row Name 01/24/20 1406          Home Main Entrance    Number of Stairs, Main Entrance  three  -HD     Row Name 01/24/20 1406          Cognitive Assessment/Intervention- PT/OT    Orientation Status (Cognition)  oriented x 4  -HD     Row Name 01/24/20 1406          Safety Issues, Functional Mobility    Impairments Affecting Function (Mobility)  balance;endurance/activity tolerance  -HD       User Key  (r) = Recorded By, (t) = Taken By, (c) = Cosigned By    Initials Name Provider Type    HD Lisa Duarte, PT Physical Therapist        Mobility     Row Name 01/24/20 1407          Bed Mobility Assessment/Treatment    Bed Mobility Assessment/Treatment  supine-sit;sit-supine  -HD     Supine-Sit [place] (Bed Mobility)  independent  -HD     Sit-Supine [place] (Bed Mobility)  independent  -HD     Row Name 01/24/20 1407          Sit-Stand Transfer    Sit-Stand [place] (Transfers)  supervision  -HD     Assistive Device (Sit-Stand Transfers)  walker, front-wheeled  -HD     Row Name 01/24/20 1407          Gait/Stairs Assessment/Training    Gait/Stairs Assessment/Training  gait/ambulation independence;gait/ambulation assistive device  -HD     [place] Level (Gait)  supervision  -HD     Assistive Device (Gait)  walker, front-wheeled  -HD     Distance in Feet (Gait)  450 ft  -HD     Comment (Gait/Stairs)  observed pt ambulating with nursing assistance without AD with impaired balance, gait assessed with RW with improved balance noted   -HD       User Key  (r) = Recorded By, (t) = Taken By, (c) = Cosigned By    Initials Name Provider Type    Lisa Smiley, PT Physical Therapist        Obj/Interventions     Row Name 01/24/20 1408          General ROM    GENERAL ROM COMMENTS  right UE WFL, left UE limited 25% shoulder elevation, BLEs WFL  -HD     Row Name  01/24/20 1408          MMT (Manual Muscle Testing)    General MMT Comments  right UE 4/5 MMT, left UE 4-/5 MMT, BLEs 4/5 MMT   -HD     Row Name 01/24/20 1408          Static Sitting Balance    Level of [place] (Unsupported Sitting, Static Balance)  independent  -HD     Sitting Position (Unsupported Sitting, Static Balance)  sitting on edge of bed  -HD     Time Able to Maintain Position (Unsupported Sitting, Static Balance)  2 to 3 minutes  -HD     Row Name 01/24/20 1408          Dynamic Sitting Balance    Level of [place], Reaches Outside Midline (Sitting, Dynamic Balance)  independent  -HD     Sitting Position, Reaches Outside Midline (Sitting, Dynamic Balance)  sitting on edge of bed  -HD     Row Name 01/24/20 1408          Static Standing Balance    Level of [place] (Supported Standing, Static Balance)  supervision  -HD     Time Able to Maintain Position (Supported Standing, Static Balance)  2 to 3 minutes  -HD     Assistive Device Utilized (Supported Standing, Static Balance)  walker, rolling  -HD     Row Name 01/24/20 1408          Dynamic Standing Balance    Level of [place], Reaches Outside Midline (Standing, Dynamic Balance)  supervision  -HD     Time Able to Maintain Position, Reaches Outside Midline (Standing, Dynamic Balance)  4 to 5 minutes  -HD     Assistive Device Utilized (Supported Standing, Dynamic Balance)  walker, rolling  -HD       User Key  (r) = Recorded By, (t) = Taken By, (c) = Cosigned By    Initials Name Provider Type    Lsia Smiley, PT Physical Therapist        Goals/Plan    No documentation.       Clinical Impression     Row Name 01/24/20 1409          Pain Assessment    Additional Documentation  Pain Scale: Numbers Pre/Post-Treatment (Group)  -[facility] Name 01/24/20 1409          Pain Scale: Numbers Pre/Post-Treatment    Pain Scale: Numbers, Pretreatment  0/10 - no pain  -     Pain Scale: Numbers, Post-Treatment  0/10 - no pain  -     Row Name 01/24/20  1409          Physical Therapy Clinical Impression    Patient/Family Goals Statement (PT Clinical Impression)  Pt is 81 yo male admitted for chest pain, CHF, MASOUD.  BNP 34,000.     -HD     Criteria for Skilled Interventions Met (PT Clinical Impression)  no problems identified which require skilled intervention  -HD     Rehab Potential (PT Clinical Summary)  good, to achieve stated therapy goals  -HD     Row Name 01/24/20 1409          Vital Signs    Post SpO2 (%)  96  -HD     O2 Delivery Post Treatment  room air  -HD     Row Name 01/24/20 1409          Positioning and Restraints    Pre-Treatment Position  in bed  -HD     Post Treatment Position  bed  -HD     In Bed  notified nsg;call light within reach;encouraged to call for assist;exit alarm on  -HD       User Key  (r) = Recorded By, (t) = Taken By, (c) = Cosigned By    Initials Name Provider Type    Lisa Smiley, PT Physical Therapist        Outcome Measures    No documentation.       Physical Therapy Education                 Title: PT OT SLP Therapies (Done)     Topic: Physical Therapy (Done)     Point: Mobility training (Done)     Description:   Instruct learner(s) on safety and technique for assisting patient out of bed, chair or wheelchair.  Instruct in the proper use of assistive devices, such as walker, crutches, cane or brace.              Patient Friendly Description:   It's important to get you on your feet again, but we need to do so in a way that is safe for you. Falling has serious consequences, and your personal safety is the most important thing of all.        When it's time to get out of bed, one of us or a family member will sit next to you on the bed to give you support.     If your doctor or nurse tells you to use a walker, crutches, a cane, or a brace, be sure you use it every time you get out of bed, even if you think you don't need it.    Learning Progress Summary           Patient Acceptance, E, VU by ALESSANDRA at 1/24/2020 1411                    Point: Precautions (Done)     Description:   Instruct learner(s) on prescribed precautions during mobility and gait tasks              Learning Progress Summary           Patient Acceptance, E, VU by  at 1/24/2020 1411                               User Key     Initials Effective Dates Name Provider Type Discipline     03/01/19 -  Lisa Duarte, PT Physical Therapist PT              PT Recommendation and Plan     Outcome Summary/Treatment Plan (PT)  Anticipated Discharge Disposition (PT): home with home health  Plan of Care Reviewed With: patient  Progress: improving  Outcome Summary: Pt is 81 yo male admitted for chest pain, CHF, MASOUD.  BNP 34,000.   Pt able to complete bed mobility with indep and transfers with SUP.  Pt observed ambulating with nursing staff without AD exhibitnig impaired balance and needing HHA for pt safety.  PT assessed gait using RW with improved balance and endurance noted.  Pt can be up with nursing SUP and use of RW.  PT recommends return home with HHPT and use of RW at all times for pt safety.  No further inpatient PT needs.     Time Calculation:   PT Charges     Row Name 01/24/20 1413             Time Calculation    Start Time  1340  -HD      Stop Time  1403  -HD      Time Calculation (min)  23 min  -HD      PT Received On  01/24/20  -HD         Time Calculation- PT    Total Timed Code Minutes- PT  0 minute(s)  -HD        User Key  (r) = Recorded By, (t) = Taken By, (c) = Cosigned By    Initials Name Provider Type     Lisa Duarte, PT Physical Therapist        Therapy Charges for Today     Code Description Service Date Service Provider Modifiers Qty    [id number] HC PT EVAL LOW COMPLEXITY 4 1/24/2020 Lisa Duarte, PT GP 1               PT Discharge Summary  Anticipated Discharge Disposition (PT): home with home health    Lisa Duarte PT  1/24/2020

## 2020-01-24 NOTE — ED NOTES
"Patient reports chest pain that he describes as \"congestion\" for approx 2 weeks. Frequent cough, SOA, and weakness reported. States that he has a pacemaker and an AICD placed. Reports that he gets pneumonia almost every winter and feels the same as he does when he is ill with that.      Kelly Maya, LPN  01/24/20 0006    "

## 2020-01-24 NOTE — PLAN OF CARE
Problem: Patient Care Overview  Goal: Plan of Care Review  Outcome: Outcome(s) achieved  Flowsheets (Taken 1/24/2020 1411)  Progress: improving  Plan of Care Reviewed With: patient  Outcome Summary: Pt is 79 yo male admitted for chest pain, CHF, MASOUD.  BNP 34,000.   Pt able to complete bed mobility with indep and transfers with SUP.  Pt observed ambulating with nursing staff without AD exhibiting impaired balance and needing HHA for pt safety.  PT assessed gait using RW with improved balance and endurance noted.  Pt can be up with nursing SUP and use of RW.  PT recommends return home with HHPT and use of RW at all times for pt safety.  No further inpatient PT needs.

## 2020-01-24 NOTE — H&P
Patient Care Team:  Yassine Haines MD as PCP - General  Yassine Haines MD as PCP - Family Medicine  Amalia Leal MD as PCP - Claims Attributed  Amalia Leal MD as Consulting Physician (Cardiology)    Chief complaint chest pain, increase shortness of breath      Subjective     Patient is a 80 y.o. male was in his Patient was in his usual state of health 2 weeks ago.  The patient says about 2 weeks ago he started having some cough, expectoration increased shortness of breath.  And since then he has increased swelling in both lower extremities.  He has been increasing his diuretics.  Unortunately he continues to smoke.  Says that he has been using is his albuterol inhaler frequently than before.  Today while he was at [employer] he had some increased shortness of breath.  When coming out to the cold air had severe shortness of breath and sternal chest pain started using his albuterol inhaler he was unable to take deep breaths.  Came to the emergency room and was evaluated found that his BNP is elevated as well as his troponin.  Been started on nitroglycerin patch and given diuretics.  I saw him this morning he was chest pain-free.  Not have any potation or fluttering.  Was laying in the bed at 0 degrees angle doubt any orthopnea.  He was speaking full sentences.  He definitely has a cough with some expectoration sputum is green in color.  Denies any hemoptysis.  He denies any painful breathing.  He will has some substernal discomfort.  He does not have any difficulty swallowing.  He does not have any reflux.      History  Past Medical History:   Diagnosis Date   • Atrial fibrillation (CMS/HCC)    • CHF (congestive heart failure) (CMS/HCC)    • Hyperlipidemia    • Hypertension    • Myocardial infarction (CMS/HCC)      Past Surgical History:   Procedure Laterality Date   • CARDIAC CATHETERIZATION     • CARDIAC ELECTROPHYSIOLOGY PROCEDURE N/A 9/6/2019    Procedure: ICD DC generator change;  Surgeon:  Amalia Leal MD;  Location: [facility] CATH INVASIVE LOCATION;  Service: Cardiovascular   • CARDIAC ELECTROPHYSIOLOGY PROCEDURE N/A 9/6/2019    Procedure: Pocket Revision;  Surgeon: Amalia eLal MD;  Location: [facility] CATH INVASIVE LOCATION;  Service: Cardiovascular   • CORONARY ANGIOPLASTY WITH STENT PLACEMENT     • CORONARY ARTERY BYPASS GRAFT     • PACEMAKER REPLACEMENT       History reviewed. No pertinent family history.  Social History     Tobacco Use   • Smoking status: Current Every Day Smoker     Packs/day: 0.50     Types: Cigarettes   • Smokeless tobacco: Never Used   Substance Use Topics   • Alcohol use: No     Frequency: Never   • Drug use: Not on file     Medications Prior to Admission   Medication Sig Dispense Refill Last Dose   • amiodarone (PACERONE) 200 MG tablet Take 200 mg by mouth Daily.      • atorvastatin (LIPITOR) 10 MG tablet Take 10 mg by mouth Daily.      • budesonide-formoterol (SYMBICORT) 160-4.5 MCG/ACT inhaler Inhale 2 puffs 2 (Two) Times a Day.      • carvedilol (COREG) 12.5 MG tablet Take 12.5 mg by mouth 2 (Two) Times a Day.   Patient Taking Differently at Unknown time   • clopidogrel (PLAVIX) 75 MG tablet Take 75 mg by mouth Daily.      • furosemide (LASIX) 40 MG tablet Take 40 mg by mouth 2 (Two) Times a Day. Tues, Thur, Sat, Sun      • furosemide (LASIX) 40 MG tablet Take 40 mg by mouth Daily. Mon, Wed, Fri      • albuterol sulfate HFA (VENTOLIN HFA) 108 (90 Base) MCG/ACT inhaler Inhale 1 puff Every 6 (Six) Hours As Needed for Shortness of Air.   Not Taking   • aspirin (ASPIR-LOW) 81 MG EC tablet Take 81 mg by mouth Daily.   Taking   • FEROSUL 325 (65 Fe) MG tablet Take 325 mg by mouth Daily With Breakfast.   Taking   • spironolactone (ALDACTONE) 25 MG tablet Take 25 mg by mouth Daily.   Taking   • vitamin D (ERGOCALCIFEROL) 04731 units capsule capsule Take 50,000 Units by mouth Every 14 (Fourteen) Days.   Taking     Allergies:  Patient has no known allergies.    Objective  "    Vital Signs  /67   Pulse 74   Temp 97.3 °F (36.3 °C) (Oral)   Resp 16   Ht 180.3 cm (71\")   Wt 76.1 kg (167 lb 12.3 oz)   SpO2 98%   BMI 23.40 kg/m²       Physical Exam:      General Appearance:    Alert, cooperative, in no acute distress, Speaks full sentences there is no shortness of breath at the time of my examination.  He does not have tachypnea or cyanosis   Head:    Normocephalic, without obvious abnormality, atraumatic   Eyes:            Lids and lashes normal, conjunctivae and sclerae normal, no   icterus, no pallor, corneas clear, PERRLA   Ears:    Ears appear intact with no abnormalities noted   Throat:   No oral lesions, no thrush, oral mucosa moist , Dentures, tobacco stained mucosa   Neck:   No adenopathy, supple, trachea midline, no thyromegaly, no   carotid bruit, Elevated jugular venous pressure 0 degree angle.  On setting he did not have regular venous distention.   Lungs:   Bibasilar rales with expiratory rhonchi, prolongation of the expiratory phased    Heart:    Irregular irregular rhythm WCVR, normal S1 and S2, no            murmur, no gallop, no rub, no click   Chest Wall:    No abnormalities observed   Abdomen:     Normal bowel sounds, no masses, no organomegaly, soft        non-tender, non-distended, no guarding, no rebound                tenderness   Extremities:  +3 Pitting edema   Pulses:   Pulses palpable and equal bilaterally   Skin:   No bleeding, bruising or rash   Lymph nodes:   No palpable adenopathy   Neurologic:   Cranial nerves 2 - 12 grossly intact, sensation intact, DTR       present and equal bilaterally       Results Review:     Imaging Results (Last 24 Hours)     Procedure Component Value Units Date/Time    XR Chest 1 View [[id number]] Collected:  01/24/20 0715     Updated:  01/24/20 0718    Narrative:       DATE OF EXAM:  1/23/2020 11:21 PM     PROCEDURE:  XR CHEST 1 VW-     INDICATIONS:  chest pain     COMPARISON:  4/28/2019     TECHNIQUE:   Single " radiographic view of the chest was obtained.     FINDINGS:  Left subclavian AICD device is again noted. Sternotomy wires overlie the  midline. There is obscuration of the left base due to cardiomegaly.  Marked cardiomegaly is again present. There is mild pulmonary vascular  congestion. Question small left pleural effusion. Retrocardiac density  suggests a hiatal hernia.       Impression:          1. Cardiomegaly with mild pulmonary vascular congestion.  2. Question small left pleural effusion.     Electronically Signed By-Ethan Reynolds On:2020 7:16 AM  This report was finalized on  by  Ethan Reynolds, .             ECG/EMG Results (last 24 hours)     Procedure Component Value Units Date/Time    ECG 12 Lead [[id number]] Collected:  20     Updated:  20    Narrative:       HEART RATE= 116  bpm  RR Interval= 515  ms  GA Interval=   ms  P Horizontal Axis=   deg  P Front Axis=   deg  QRSD Interval= 137  ms  QT Interval= 372  ms  QRS Axis= 115  deg  T Wave Axis= -67  deg  - ABNORMAL ECG -  Atrial fibrillation  Nonspecific repol abnormality, lateral leads  When compared with ECG of 03-Sep-2019 16:06:10,  Significant repolarization change  Electronically Signed By: Satinder Campbell ([facility]) 2020 07:21:59  Date and Time of Study: 2020 23:27:24            Name: AREN BEAR EStudy Date: 2019 04:44 PM           BP: 102/72 mmHg  MRN: [id number]       Patient Location:   : 1940      Gender: Male                              Height: 71 in  Age: 79 yrs          Account#: [id number]                     Weight: 160 lb  Reason For Study: DYSPNEA, CHF, COPD                           BSA: 1.9 m2  Ordering Physician:  PRAVEEN GALLEGOS  Referring Physician:  NOEMY CUMMINGS  Performed By: SHELLIE        M-Mode/2-D Measurements:  LVIDd: 5.8 cm       (3.7-5.7) LVPWd: 1.1 cm        (0.8-1.2)  LVIDs: 5.5 cm       (2.3-3.9)  ACS: 2.2 cm         (1.6-3.7) IVSd: 1.6 cm         (0.7-1.2)  LA  dimension: 5.4 cm(1.9-4.0) RVDd: 3.7 cm         (0.7-2.4)  FS: 6.3 %           (21-40%)  Ao root diam: 4.0 cm (2.0-3.7)     Comments  Technically satisfactory study. Mitral valve is thickened with adequate  opening motion. Moderate mitral regurgitation is present. Tricuspid valve is  normal. Significant tricuspid regurgitation is present. Calculated pulmonary  artery pressure is 40 mm of mercury. Aortic valve is thickened with adequate  opening motion. Mild aortic regurgitation is present. Mild pulmonic  regurgitation is present. Significant biatrial enlargement. Left ventricle and  right ventricle are enlarged with severe and diffuse hypocontractility with  ejection fraction of 20%. No pericardial effusion or intracardiac thrombus is  seen.     Impression     Significant biventricular dysfunction with left ventricle ejection fraction of  20%. Findings consistent with cardiomyopathy.     Moderate mitral significant tricuspid mild aortic and pulmonic regurgitation.     Calculated pulmonary artery pressure is 40 mm of mercury.     Significant biatrial enlargement.     No pericardial effusion or intracardiac thrombus is seen.        Interpretation     MMode/2D Measurements & Calculations  ESV(Teich): 145.2 ml  EF(Teich): 13.8 %                      Ao root area: 12.7 cm2  Asc Aorta Diam: 3.5 cm                 LVOT diam: 2.4 cm     EDV(MOD-sp4): 93.5 ml  ESV(MOD-sp4): 64.0 ml  EF(MOD-sp4): 31.5 %     Doppler Measurements & Calculations  MV E max lázaro: 83.8 cm/sec               MV max PG: 3.6 mmHg                                          MV mean P.2 mmHg  MV dec time: 0.10 sec                   Ao V2 max: 111.9 cm/sec                                          Ao max P.0 mmHg                                          Ao V2 mean: 75.2 cm/sec                                          Ao mean P.5 mmHg                                          Ao V2 VTI: 16.7 cm                                          GENARO(I,D): 2.3  cm2                                             GENARO(V,D): 2.3 cm2  AI max lázaro: 375.8 cm/sec                LV V1 max P.2 mmHg  AI max P.6 mmHg                    LV V1 mean P.61 mmHg  AI dec slope: 188.6 cm/sec2             LV V1 max: 55.9 cm/sec  AI dec time: 2.0 sec                    LV V1 mean: 36.1 cm/sec  AI P1/2t: 583.5 msec                    LV V1 VTI: 8.5 cm  MR max lázaro: 423.7 cm/sec                MR PISA radius: 0.64 cm  MR max P.8 mmHg  MR VTI: 121.5 cm  PA max P.7 mmHg                     PI end-d lázaro: 172.4 cm/sec  TR max lázaro: 269.5 cm/sec  TR max P.1 mmHg  RVSP(TR): 39.1 mmHg        _______________________________________________________________________________     Electronically signed by: Amalia Leal MD  on 2019 11:02 AM    Lab Results (last 24 hours)     Procedure Component Value Units Date/Time    Lactic Acid, Reflex [[id number]]  (Normal) Collected:  20 0309    Specimen:  Blood Updated:  20 0332     Lactate 1.4 mmol/L     Lactic Acid, Reflex Timer (This will reflex a repeat order 3-3:15 hours after ordered.) [[id number]] Collected:  20 2352    Specimen:  Blood Updated:  20 0300     Extra Tube Hold for add-ons.     Comment: Auto resulted.       Extra Tubes [[id number]] Collected:  20 2341    Specimen:  Blood, Venous Line Updated:  20 0045    Narrative:       The following orders were created for panel order Extra Tubes.  Procedure                               Abnormality         Status                     ---------                               -----------         ------                     Gold Top - RUST[[id number]]                                   Final result                 Please view results for these tests on the individual orders.    Gold Top - SST [[id number]] Collected:  20 2341    Specimen:  Blood Updated:  20 0045     Extra Tube Hold for add-ons.     Comment: Auto resulted.       Blood Culture - Blood,  Arm, Left [[id number]] Collected:  01/24/20 0022    Specimen:  Blood from Arm, Left Updated:  01/24/20 0028    BNP [[id number]]  (Abnormal) Collected:  01/23/20 2341    Specimen:  Blood Updated:  01/24/20 0024     proBNP 34,244.0 pg/mL     Narrative:       Among patients with dyspnea, NT-proBNP is highly sensitive for the detection of acute congestive heart failure. In addition NT-proBNP of <300 pg/ml effectively rules out acute congestive heart failure with 99% negative predictive value.    Results may be falsely decreased if patient taking Biotin.      Protime-INR [[id number]]  (Abnormal) Collected:  01/23/20 2341    Specimen:  Blood Updated:  01/24/20 0021     Protime 12.2 Seconds      INR 1.19    aPTT [[id number]]  (Abnormal) Collected:  01/23/20 2341    Specimen:  Blood Updated:  01/24/20 0021     PTT 22.4 seconds     Troponin [[id number]]  (Abnormal) Collected:  01/23/20 2341    Specimen:  Blood Updated:  01/24/20 0018     Troponin T 0.059 ng/mL     Narrative:       Troponin T Reference Range:  <= 0.03 ng/mL-   Negative for AMI  >0.03 ng/mL-     Abnormal for myocardial necrosis.  Clinicians would have to utilize clinical acumen, EKG, Troponin and serial changes to determine if it is an Acute Myocardial Infarction or myocardial injury due to an underlying chronic condition.       Results may be falsely decreased if patient taking Biotin.      Basic Metabolic Panel [[id number]]  (Abnormal) Collected:  01/23/20 2341    Specimen:  Blood Updated:  01/24/20 0013     Glucose 124 mg/dL      BUN 49 mg/dL      Creatinine 2.67 mg/dL      Sodium 141 mmol/L      Potassium 4.7 mmol/L      Chloride 98 mmol/L      CO2 29.0 mmol/L      Calcium 9.4 mg/dL      eGFR Non African Amer 23 mL/min/1.73      BUN/Creatinine Ratio 18.4     Anion Gap 14.0 mmol/L     Narrative:       GFR Normal >60  Chronic Kidney Disease <60  Kidney Failure <15      POC Lactate [[id number]]  (Abnormal) Collected:  01/23/20 2352    Specimen:  Blood Updated:   01/23/20 2353     Lactate 2.1 mmol/L      Comment: Serial Number: [id number]:  [id number]       Blood Culture - Blood, Arm, Left [[id number]] Collected:  01/23/20 2349    Specimen:  Blood from Arm, Left Updated:  01/23/20 2352    CBC & Differential [[id number]] Collected:  01/23/20 2341    Specimen:  Blood Updated:  01/23/20 2349    Narrative:       The following orders were created for panel order CBC & Differential.  Procedure                               Abnormality         Status                     ---------                               -----------         ------                     CBC Auto Differential[[id number]]        Abnormal            Final result                 Please view results for these tests on the individual orders.    CBC Auto Differential [[id number]]  (Abnormal) Collected:  01/23/20 2341    Specimen:  Blood Updated:  01/23/20 2349     WBC 8.60 10*3/mm3      RBC 4.42 10*6/mm3      Hemoglobin 15.4 g/dL      Hematocrit 46.2 %      .7 fL      MCH 34.9 pg      MCHC 33.4 g/dL      RDW 15.4 %      RDW-SD 57.3 fl      MPV 10.0 fL      Platelets 135 10*3/mm3      Neutrophil % 84.2 %      Lymphocyte % 7.6 %      Monocyte % 6.9 %      Eosinophil % 0.9 %      Basophil % 0.4 %      Neutrophils, Absolute 7.30 10*3/mm3      Lymphocytes, Absolute 0.70 10*3/mm3      Monocytes, Absolute 0.60 10*3/mm3      Eosinophils, Absolute 0.10 10*3/mm3      Basophils, Absolute 0.00 10*3/mm3      nRBC 0.4 /100 WBC            I reviewed the patient's new clinical results.    Assessment/Plan     Angina Pectoris with elevated troponin  Acute on chronic systolic failure LVEF of 20%  Atrial fibrillation and controlled ventricular response with valvular disease  S/P  post pacemaker  Acute exacerbation of COPD  Continuous tobacco abuse  Acute kidney injury/chronic kidney disease  Elevated lactic acid  Essential hypertension  Mixed hyperlipidemia      Active Problems:    Chest pain      The patient was admitted through  the emergency room, he he will have diuretics and has been started on Rocephin 2 g IV.  He will continue his home medications. Cardiology consult.  Nephrology consult because of worsening of renal function.  DuoNebs every 6 hours per nasal cannula.  Nicotine patch 21 mg.  PT eval for home safety.    I discussed the patients findings and my recommendations with patient.     Yassine Haines MD  01/24/20  10:04 AM

## 2020-01-24 NOTE — CONSULTS
NEPHROLOGY CONSULTATION-----[facility]    [facility]  [phone]  Jennifer West MD    Patient Care Team:  Yassine Haines MD as PCP - General  Yassine Haines MD as PCP - Family Medicine  Amalia Leal MD as PCP - Claims Attributed  Amalia Leal MD as Consulting Physician (Cardiology)  Trang West MD as Consulting Physician (Nephrology)    CC/REASON FOR CONSULTATION: RENAL FAILURE/ELEVATED CREATININE  PHYSICIAN REQUESTING CONSULTATION:     History of Present Illness     HPI    Patient is a 80 y.o. WM, very well known to me as I actively follow him as an outpatient, whom I was asked to see in consultation for evaluation and management of renal failure/elevated serum creatinine. Patient was admitted with SOB and CP.   No NSAIDs or recent IV dye exposure. No known h/o hepatitis, TB, rheumatic fever, jaundice, SLE. Does bleed/bruise easily. Some urinary hesitancy. No edema.  +Compliance with home meds. Was on diuretics in the form of  Lasix and Aldactone prior to admission.   No herbal med use.      Review of Systems   Constitutional: Positive for activity change and fatigue. Negative for appetite change, chills, diaphoresis, fever and unexpected weight change.   HENT: Positive for congestion. Negative for dental problem, drooling, ear discharge, ear pain, facial swelling, hearing loss, mouth sores, nosebleeds, postnasal drip, rhinorrhea, sinus pressure, sinus pain, sneezing, sore throat, tinnitus, trouble swallowing and voice change.    Eyes: Negative for photophobia, pain, discharge, redness, itching and visual disturbance.   Respiratory: Positive for shortness of breath. Negative for apnea, cough, choking, chest tightness, wheezing and stridor.    Cardiovascular: Positive for chest pain and palpitations. Negative for leg swelling.   Gastrointestinal: Negative for abdominal distention, abdominal pain, anal bleeding, blood in stool,  constipation, diarrhea, nausea, rectal pain and vomiting.   Endocrine: Negative for cold intolerance, heat intolerance, polydipsia, polyphagia and polyuria.   Genitourinary: Positive for difficulty urinating. Negative for decreased urine volume, dysuria, enuresis, flank pain, frequency, genital sores, hematuria and urgency.   Musculoskeletal: Positive for arthralgias and back pain. Negative for gait problem, joint swelling, myalgias, neck pain and neck stiffness.   Skin: Negative for color change, pallor, rash and wound.   Allergic/Immunologic: Negative for environmental allergies, food allergies and immunocompromised state.   Neurological: Positive for weakness. Negative for dizziness, tremors, seizures, syncope, facial asymmetry, speech difficulty, light-headedness, numbness and headaches.   Hematological: Negative for adenopathy. Bruises/bleeds easily.   Psychiatric/Behavioral: Negative for agitation, behavioral problems, confusion, decreased concentration, dysphoric mood, hallucinations, self-injury, sleep disturbance and suicidal ideas. The patient is not nervous/anxious and is not hyperactive.           Past Medical History:   Diagnosis Date   • Atrial fibrillation (CMS/[facility], located [facility])    • CHF (congestive heart failure) (CMS/[facility], located within [facility])    • Hyperlipidemia    • Hypertension    • Myocardial infarction (CMS/[facility], located within [facility])        Past Surgical History:   Procedure Laterality Date   • CARDIAC CATHETERIZATION     • CARDIAC ELECTROPHYSIOLOGY PROCEDURE N/A 9/6/2019    Procedure: ICD DC generator change;  Surgeon: Amalia Leal MD;  Location: [facility] CATH INVASIVE LOCATION;  Service: Cardiovascular   • CARDIAC ELECTROPHYSIOLOGY PROCEDURE N/A 9/6/2019    Procedure: Pocket Revision;  Surgeon: Amalia Leal MD;  Location: [facility] CATH INVASIVE LOCATION;  Service: Cardiovascular   • CORONARY ANGIOPLASTY WITH STENT PLACEMENT     • CORONARY ARTERY BYPASS GRAFT     • PACEMAKER REPLACEMENT         History reviewed. No pertinent family  history.    Social History     Tobacco Use   • Smoking status: Current Every Day Smoker     Packs/day: 0.50     Types: Cigarettes   • Smokeless tobacco: Never Used   Substance Use Topics   • Alcohol use: No     Frequency: Never   • Drug use: Not on file       Home Meds:   Medications Prior to Admission   Medication Sig Dispense Refill Last Dose   • amiodarone (PACERONE) 200 MG tablet Take 200 mg by mouth Daily.      • atorvastatin (LIPITOR) 10 MG tablet Take 10 mg by mouth Daily.      • budesonide-formoterol (SYMBICORT) 160-4.5 MCG/ACT inhaler Inhale 2 puffs 2 (Two) Times a Day.      • carvedilol (COREG) 12.5 MG tablet Take 12.5 mg by mouth 2 (Two) Times a Day.   Patient Taking Differently at Unknown time   • clopidogrel (PLAVIX) 75 MG tablet Take 75 mg by mouth Daily.      • furosemide (LASIX) 40 MG tablet Take 40 mg by mouth 2 (Two) Times a Day. Tues, Thur, Sat, Sun      • furosemide (LASIX) 40 MG tablet Take 40 mg by mouth Daily. Mon, Wed, Fri      • albuterol sulfate HFA (VENTOLIN HFA) 108 (90 Base) MCG/ACT inhaler Inhale 1 puff Every 6 (Six) Hours As Needed for Shortness of Air.   Not Taking   • aspirin (ASPIR-LOW) 81 MG EC tablet Take 81 mg by mouth Daily.   Taking   • FEROSUL 325 (65 Fe) MG tablet Take 325 mg by mouth Daily With Breakfast.   Taking   • spironolactone (ALDACTONE) 25 MG tablet Take 25 mg by mouth Daily.   Taking   • vitamin D (ERGOCALCIFEROL) 50832 units capsule capsule Take 50,000 Units by mouth Every 14 (Fourteen) Days.   Taking       Scheduled Meds:    amiodarone 200 mg Oral Daily   aspirin 81 mg Oral Daily   atorvastatin 10 mg Oral Daily   budesonide-formoterol 2 puff Inhalation BID - RT   carvedilol 12.5 mg Oral BID   clopidogrel 75 mg Oral Daily   enoxaparin 30 mg Subcutaneous Q24H   furosemide 40 mg Oral Daily   sodium chloride 10 mL Intravenous Q12H   spironolactone 25 mg Oral Daily       Continuous Infusions:       PRN Meds:  [COMPLETED] Insert peripheral IV **AND** sodium chloride  •   sodium chloride    Allergies:  Patient has no known allergies.    OBJECTIVE    Vital Signs  Temp:  [97.3 °F (36.3 °C)-97.4 °F (36.3 °C)] 97.3 °F (36.3 °C)  Heart Rate:  [] 74  Resp:  [16-25] 16  BP: ()/(63-76) 108/67    I/O this shift:  In: 720 [P.O.:720]  Out: 400 [Urine:400]  No intake/output data recorded.    Physical Exam:  General Appearance: alert, appears stated age and cooperative  Head: normocephalic, without obvious abnormality and atraumatic  Eyes: conjunctivae and sclerae normal and no icterus  Neck: supple. +MILD JVD  Lungs: +FEW SCATTERED RHONCHI AND FINE L CRACKLES  Heart: IRREG IRREG +KRISHNA  Chest Wall: no abnormalities observed  Abdomen: normal bowel sounds and soft non-tender  Extremities: moves extremities well, no edema, no cyanosis and no redness  Skin: +SCATTERED ECCHYMOSIS  Neurologic: Alert, and oriented. No focal deficits    Results Review:    I reviewed the patient's new clinical results.    WBC WBC   Date Value Ref Range Status   01/23/2020 8.60 3.40 - 10.80 10*3/mm3 Final      HGB Hemoglobin   Date Value Ref Range Status   01/23/2020 15.4 13.0 - 17.7 g/dL Final      HCT Hematocrit   Date Value Ref Range Status   01/23/2020 46.2 37.5 - 51.0 % Final      Platlets No results found for: LABPLAT   MCV MCV   Date Value Ref Range Status   01/23/2020 104.7 (H) 79.0 - 97.0 fL Final          Sodium Sodium   Date Value Ref Range Status   01/23/2020 141 136 - 145 mmol/L Final      Potassium Potassium   Date Value Ref Range Status   01/23/2020 4.7 3.5 - 5.2 mmol/L Final      Chloride Chloride   Date Value Ref Range Status   01/23/2020 98 98 - 107 mmol/L Final      CO2 CO2   Date Value Ref Range Status   01/23/2020 29.0 22.0 - 29.0 mmol/L Final      BUN BUN   Date Value Ref Range Status   01/23/2020 49 (H) 8 - 23 mg/dL Final      Creatinine Creatinine   Date Value Ref Range Status   01/23/2020 2.67 (H) 0.76 - 1.27 mg/dL Final      Calcium Calcium   Date Value Ref Range Status   01/23/2020 9.4  8.6 - 10.5 mg/dL Final      PO4 No results found for: CAPO4   Albumin No results found for: ALBUMIN   Magnesium No results found for: MG   Uric Acid No results found for: URICACID       Imaging Results (Last 72 Hours)     Procedure Component Value Units Date/Time    XR Chest 1 View [[id number]] Collected:  01/24/20 0715     Updated:  01/24/20 0718    Narrative:       DATE OF EXAM:  1/23/2020 11:21 PM     PROCEDURE:  XR CHEST 1 VW-     INDICATIONS:  chest pain     COMPARISON:  4/28/2019     TECHNIQUE:   Single radiographic view of the chest was obtained.     FINDINGS:  Left subclavian AICD device is again noted. Sternotomy wires overlie the  midline. There is obscuration of the left base due to cardiomegaly.  Marked cardiomegaly is again present. There is mild pulmonary vascular  congestion. Question small left pleural effusion. Retrocardiac density  suggests a hiatal hernia.       Impression:          1. Cardiomegaly with mild pulmonary vascular congestion.  2. Question small left pleural effusion.     Electronically Signed ByCari Reynolds On:1/24/2020 7:16 AM  This report was finalized on [id number] by  Ethan Reynolds, .            Results for orders placed during the hospital encounter of 01/23/20   XR Chest 1 View    Narrative DATE OF EXAM:  1/23/2020 11:21 PM     PROCEDURE:  XR CHEST 1 VW-     INDICATIONS:  chest pain     COMPARISON:  4/28/2019     TECHNIQUE:   Single radiographic view of the chest was obtained.     FINDINGS:  Left subclavian AICD device is again noted. Sternotomy wires overlie the  midline. There is obscuration of the left base due to cardiomegaly.  Marked cardiomegaly is again present. There is mild pulmonary vascular  congestion. Question small left pleural effusion. Retrocardiac density  suggests a hiatal hernia.       Impression    1. Cardiomegaly with mild pulmonary vascular congestion.  2. Question small left pleural effusion.     Electronically Signed ByCari Reynolds On:1/24/2020 7:16  AM  This report was finalized on [id number] by  Ethan Reynolds, .              ASSESSMENT / PLAN      Chest pain    1. RENAL FAILURE--------Nonoliguric. +ARF/MASOUD on top of known CRF/CKD STG 3, with a baseline serum creatinine of 1.9. In the long run we may have to accept a higher baseline serum creatinine in order to keep euvolemic. +ARF/MASOUD on top of known CRF/CKD STG 3. CRF/CKD STG 3 secondary to HTN NS. +ARF/MASOUD appears to be secondary to decompensated CHF/CP state (cardiorenal) with some relative hypotension also. Avoid hypotension. Cautious diuresis. Check urine and serum studies and renal US and PVR. No NSAIDs or IV dye. Dose meds for CrCl less than 10 cc/min until ARF/MASOUD is resolved. Lytes, acid-base okay. No uremia. No acute indication for dialysis.    2. 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 Hold Aldactone given ARF/MASOUD. Check TSH and ECHO. Cardiology to see    3. HTN WITH CKD------BP low. Avoid hypotension. No ACE/ARB/DRI for now    4. ANEMIA OF CKD------Current H/H normal    5. OA/DJD------No NSAIDs. Check uric acid level    6. ATRIAL FIBRILLATION----------Rate controlled. Cardiology to see    7. HYPERLIPIDEMIA------On Statin. Check CK, TSH    8. VITAMIN D DEFICIENCY------On supplementation    9. COPD/TOBACCO ABUSE------Oxygen, nebs. Counseled on smoking cessation    I discussed the patients findings and my recommendations with patient, family and nursing staff    Will follow along closely. Thank you for allowing us to see this patient in renal consultation.    [facility] [place]  [phone]  MD Jennifer Dwyer MD  01/24/20  2:05 PM

## 2020-01-25 NOTE — PROGRESS NOTES
Referring Provider: Yassine Haines MD    Reason for follow-up:  Cardiomyopathy  Shortness of breath     Patient Care Team:  Yassine Haines MD as PCP - General  Yassine Haines MD as PCP - Family Medicine  Amalia Leal MD as PCP - Claims Attributed  Amalia Leal MD as Consulting Physician (Cardiology)  Trang West MD as Consulting Physician (Nephrology)    Subjective .  Feeling okay.  Wants to go home     ROS    Since I have last seen, the patient has been without any chest discomfort , unusual shortness of breath, palpitations, dizziness or syncope.  Denies having any headache ,abdominal pain ,nausea, vomiting , diarrhea constipation, loss of weight or loss of appetite.  Denies having any excessive bruising ,hematuria or blood in the stool.    Denies having any ICD shocks    Review of all systems negative except as indicated    History  Past Medical History:   Diagnosis Date   • Atrial fibrillation (CMS/HCC)    • CHF (congestive heart failure) (CMS/[facility])    • Hyperlipidemia    • Hypertension    • Myocardial infarction (CMS/[facility])        Past Surgical History:   Procedure Laterality Date   • CARDIAC CATHETERIZATION     • CARDIAC ELECTROPHYSIOLOGY PROCEDURE N/A 9/6/2019    Procedure: ICD DC generator change;  Surgeon: Amalia Leal MD;  Location: [facility] CATH INVASIVE LOCATION;  Service: Cardiovascular   • CARDIAC ELECTROPHYSIOLOGY PROCEDURE N/A 9/6/2019    Procedure: Pocket Revision;  Surgeon: Amalia Leal MD;  Location: [facility] CATH INVASIVE LOCATION;  Service: Cardiovascular   • CORONARY ANGIOPLASTY WITH STENT PLACEMENT     • CORONARY ARTERY BYPASS GRAFT     • PACEMAKER REPLACEMENT         History reviewed. No pertinent family history.    Social History     Tobacco Use   • Smoking status: Current Every Day Smoker     Packs/day: 0.50     Types: Cigarettes   • Smokeless tobacco: Never Used   Substance Use Topics   • Alcohol use: No     Frequency: Never   • Drug use: Not on file         Medications Prior to Admission   Medication Sig Dispense Refill Last Dose   • amiodarone (PACERONE) 200 MG tablet Take 200 mg by mouth Daily.      • atorvastatin (LIPITOR) 10 MG tablet Take 10 mg by mouth Daily.      • budesonide-formoterol (SYMBICORT) 160-4.5 MCG/ACT inhaler Inhale 2 puffs 2 (Two) Times a Day.      • carvedilol (COREG) 12.5 MG tablet Take 12.5 mg by mouth 2 (Two) Times a Day.   Patient Taking Differently at Unknown time   • clopidogrel (PLAVIX) 75 MG tablet Take 75 mg by mouth Daily.      • furosemide (LASIX) 40 MG tablet Take 40 mg by mouth 2 (Two) Times a Day. Tues, Thur, Sat, Sun      • furosemide (LASIX) 40 MG tablet Take 40 mg by mouth Daily. Mon, Wed, Fri      • albuterol sulfate HFA (VENTOLIN HFA) 108 (90 Base) MCG/ACT inhaler Inhale 1 puff Every 6 (Six) Hours As Needed for Shortness of Air.   Not Taking   • aspirin (ASPIR-LOW) 81 MG EC tablet Take 81 mg by mouth Daily.   Taking   • FEROSUL 325 (65 Fe) MG tablet Take 325 mg by mouth Daily With Breakfast.   Taking   • spironolactone (ALDACTONE) 25 MG tablet Take 25 mg by mouth Daily.   Taking   • vitamin D (ERGOCALCIFEROL) 43691 units capsule capsule Take 50,000 Units by mouth Every 14 (Fourteen) Days.   Taking       Allergies  Patient has no known allergies.    Scheduled Meds:  amiodarone 200 mg Oral Daily   aspirin 81 mg Oral Daily   atorvastatin 10 mg Oral Daily   budesonide-formoterol 2 puff Inhalation BID - RT   bumetanide 1 mg Intravenous Q8H   calcium gluconate 1 g Intravenous Once   carvedilol 3.125 mg Oral BID   chlorothiazide 250 mg Intravenous Once   clopidogrel 75 mg Oral Daily   enoxaparin 30 mg Subcutaneous Q24H   febuxostat 40 mg Oral Daily   levothyroxine 50 mcg Oral Q AM   sodium chloride 10 mL Intravenous Q12H     Continuous Infusions:   PRN Meds:.melatonin  •  [COMPLETED] Insert peripheral IV **AND** sodium chloride  •  sodium chloride    Objective     VITAL SIGNS  Vitals:    01/24/20 1932 01/24/20 1937 01/24/20 2014  "01/25/20 0430   BP:   116/76 101/73   BP Location:   Right arm Right arm   Patient Position:   Lying Lying   Pulse: 72 72 105 109   Resp: 16 16 14 16   Temp:   97.2 °F (36.2 °C) 97.2 °F (36.2 °C)   TempSrc:   Oral Oral   SpO2: 96% 96% 97% 93%   Weight:    74.7 kg (164 lb 10.9 oz)   Height:           Flowsheet Rows      First Filed Value   Admission Height  180.3 cm (71\") Documented at 01/23/2020 2328   Admission Weight  77.1 kg (170 lb) Documented at 01/23/2020 2328            Intake/Output Summary (Last 24 hours) at 1/25/2020 0747  Last data filed at 1/25/2020 0500  Gross per 24 hour   Intake 1020 ml   Output 1125 ml   Net -105 ml        TELEMETRY: Atrial fibrillation    Physical Exam:  The patient is alert, oriented and in no distress.  Vital signs as noted above.  Head and neck revealed no carotid bruits or jugular venous distention.  No thyromegaly or lymphadenopathy is present  Lungs clear.  No wheezing.  Breath sounds are normal bilaterally.  Heart normal first and second heart sounds.  No murmur. No precordial rub is present.  No gallop is present.  Abdomen soft and nontender.  No organomegaly is present.  Extremities with good peripheral pulses without any pedal edema.  Skin warm and dry.  ICD site looks normal.  Musculoskeletal system is grossly normal  CNS grossly normal      Results Review:   I reviewed the patient's new clinical results.  Lab Results (last 24 hours)     Procedure Component Value Units Date/Time    CBC & Differential [[id number]] Collected:  01/25/20 0320    Specimen:  Blood Updated:  01/25/20 0653    Narrative:       The following orders were created for panel order CBC & Differential.  Procedure                               Abnormality         Status                     ---------                               -----------         ------                     Manual Differential[[id number]]                                                         CBC Auto Differential[[id number]]        " Abnormal            Final result                 Please view results for these tests on the individual orders.    CBC Auto Differential [[id number]]  (Abnormal) Collected:  01/25/20 0320    Specimen:  Blood Updated:  01/25/20 0653     WBC 7.70 10*3/mm3      RBC 3.85 10*6/mm3      Hemoglobin 13.8 g/dL      Hematocrit 40.9 %      .4 fL      MCH 35.8 pg      MCHC 33.7 g/dL      RDW 15.3 %      RDW-SD 56.9 fl      MPV 10.4 fL      Platelets 111 10*3/mm3      Neutrophil % 81.5 %      Lymphocyte % 9.1 %      Monocyte % 8.1 %      Eosinophil % 1.1 %      Basophil % 0.2 %      Neutrophils, Absolute 6.30 10*3/mm3      Lymphocytes, Absolute 0.70 10*3/mm3      Monocytes, Absolute 0.60 10*3/mm3      Eosinophils, Absolute 0.10 10*3/mm3      Basophils, Absolute 0.00 10*3/mm3      nRBC 0.3 /100 WBC     Narrative:       Appended report. These results have been appended to a previously verified report.    Troponin [[id number]]  (Abnormal) Collected:  01/25/20 0320    Specimen:  Blood Updated:  01/25/20 0523     Troponin T 0.039 ng/mL     Narrative:       Troponin T Reference Range:  <= 0.03 ng/mL-   Negative for AMI  >0.03 ng/mL-     Abnormal for myocardial necrosis.  Clinicians would have to utilize clinical acumen, EKG, Troponin and serial changes to determine if it is an Acute Myocardial Infarction or myocardial injury due to an underlying chronic condition.       Results may be falsely decreased if patient taking Biotin.      Phosphorus [[id number]]  (Normal) Collected:  01/25/20 0320    Specimen:  Blood Updated:  01/25/20 0509     Phosphorus 4.4 mg/dL     Comprehensive Metabolic Panel [[id number]]  (Abnormal) Collected:  01/25/20 0320    Specimen:  Blood Updated:  01/25/20 0509     Glucose 110 mg/dL      BUN 48 mg/dL      Creatinine 2.60 mg/dL      Sodium 138 mmol/L      Potassium 4.5 mmol/L      Chloride 98 mmol/L      CO2 25.0 mmol/L      Calcium 9.3 mg/dL      Total Protein 5.7 g/dL      Albumin 3.30 g/dL      ALT (SGPT)  25 U/L      AST (SGOT) 21 U/L      Alkaline Phosphatase 84 U/L      Total Bilirubin 0.8 mg/dL      eGFR Non African Amer 24 mL/min/1.73      Globulin 2.4 gm/dL      A/G Ratio 1.4 g/dL      BUN/Creatinine Ratio 18.5     Anion Gap 15.0 mmol/L     Narrative:       GFR Normal >60  Chronic Kidney Disease <60  Kidney Failure <15      Magnesium [[id number]]  (Abnormal) Collected:  01/25/20 0320    Specimen:  Blood Updated:  01/25/20 0509     Magnesium 2.6 mg/dL     BNP [[id number]]  (Abnormal) Collected:  01/25/20 0320    Specimen:  Blood Updated:  01/25/20 0509     proBNP 30,044.0 pg/mL     Narrative:       Among patients with dyspnea, NT-proBNP is highly sensitive for the detection of acute congestive heart failure. In addition NT-proBNP of <300 pg/ml effectively rules out acute congestive heart failure with 99% negative predictive value.    Results may be falsely decreased if patient taking Biotin.      TSH [[id number]]  (Abnormal) Collected:  01/25/20 0320    Specimen:  Blood Updated:  01/25/20 0509     TSH 5.480 uIU/mL     Calcium, Ionized [[id number]]  (Abnormal) Collected:  01/25/20 0320    Specimen:  Blood Updated:  01/25/20 0443     Ionized Calcium 1.17 mmol/L     Blood Culture - Blood, Arm, Left [[id number]] Collected:  01/24/20 0022    Specimen:  Blood from Arm, Left Updated:  01/25/20 0030     Blood Culture No growth at 24 hours    Blood Culture - Blood, Arm, Left [[id number]] Collected:  01/23/20 2349    Specimen:  Blood from Arm, Left Updated:  01/25/20 0000     Blood Culture No growth at 24 hours    Eosinophil Smear - Urine, Urine, Clean Catch [[id number]]  (Normal) Collected:  01/24/20 1740    Specimen:  Urine, Clean Catch Updated:  01/24/20 1941     Eosinophil Smear 0 % EOS/100 Cells     Urinalysis With Culture If Indicated - Urine, Clean Catch [[id number]]  (Normal) Collected:  01/24/20 1740    Specimen:  Urine, Clean Catch Updated:  01/24/20 1904     Color, UA Yellow     Appearance, UA Clear     pH, UA 6.5      Specific Gravity, UA 1.010     Glucose, UA Negative     Ketones, UA Negative     Bilirubin, UA Negative     Blood, UA Negative     Protein, UA Negative     Leuk Esterase, UA Negative     Nitrite, UA Negative     Urobilinogen, UA 0.2 E.U./dL    Narrative:       Urine microscopic not indicated.    CK [[id number]]  (Normal) Collected:  01/24/20 1451    Specimen:  Blood Updated:  01/24/20 1606     Creatine Kinase 31 U/L     Uric Acid [[id number]]  (Abnormal) Collected:  01/24/20 1451    Specimen:  Blood Updated:  01/24/20 1606     Uric Acid 12.6 mg/dL           Imaging Results (Last 24 Hours)     Procedure Component Value Units Date/Time    US Renal Bilateral [[id number]] Collected:  01/24/20 1552     Updated:  01/24/20 1556    Narrative:       DATE OF EXAM:  1/24/2020 3:10 PM     PROCEDURE:  US RENAL BILATERAL-     INDICATIONS:  ARF/CKD; R07.9-Chest pain, unspecified; R06.00-Dyspnea, unspecified;  I50.9-Heart failure, unspecified; N17.9-Acute kidney failure,  unspecified; N18.9-Chronic kidney disease, unspecified; R79.89-Other  specified abnormal findings of blood chemistry     COMPARISON:  Renal ultrasound 04/02/2017.     TECHNIQUE:   Grayscale and color Doppler ultrasound evaluation of the kidneys and  urinary bladder was performed.     FINDINGS:  The right kidney measures 9.9 x 6.2 x 5.5 cm. Right renal cortical  echogenicity appears within normal limits. 2 right renal cysts measure  2.9 and 1.4 cm each. No hydronephrosis.     The left kidney measures 9.1 x 4.2 x 4.6 cm. Left renal cortical  echogenicity appears within normal limits. No renal mass or  hydronephrosis is seen.     The urinary bladder is distended and unremarkable. Estimated bladder  volume is 201 ml.        Impression:       1.Right renal cysts measuring up to 2.9 cm.  2.Otherwise, unremarkable sonographic appearance of the kidneys.     Electronically Signed By-Kimberly Green On:1/24/2020 3:54 PM  This report was finalized on [id number] by  Kimberly  Peter, .      LAB RESULTS (LAST 7 DAYS)    CBC  Results from last 7 days   Lab Units 01/25/20  0320 01/23/20  2341   WBC 10*3/mm3 7.70 8.60   RBC 10*6/mm3 3.85* 4.42   HEMOGLOBIN g/dL 13.8 15.4   HEMATOCRIT % 40.9 46.2   MCV fL 106.4* 104.7*   PLATELETS 10*3/mm3 111* 135*       BMP  Results from last 7 days   Lab Units 01/25/20  0320 01/23/20  2341   SODIUM mmol/L 138 141   POTASSIUM mmol/L 4.5 4.7   CHLORIDE mmol/L 98 98   CO2 mmol/L 25.0 29.0   BUN mg/dL 48* 49*   CREATININE mg/dL 2.60* 2.67*   GLUCOSE mg/dL 110* 124*   MAGNESIUM mg/dL 2.6*  --    PHOSPHORUS mg/dL 4.4  --        CMP Results from last 7 days   Lab Units 01/25/20  0320 01/23/20  2341   SODIUM mmol/L 138 141   POTASSIUM mmol/L 4.5 4.7   CHLORIDE mmol/L 98 98   CO2 mmol/L 25.0 29.0   BUN mg/dL 48* 49*   CREATININE mg/dL 2.60* 2.67*   GLUCOSE mg/dL 110* 124*   ALBUMIN g/dL 3.30*  --    BILIRUBIN mg/dL 0.8  --    ALK PHOS U/L 84  --    AST (SGOT) U/L 21  --    ALT (SGPT) U/L 25  --          BNP        TROPONIN  Results from last 7 days   Lab Units 01/25/20  0320 01/24/20  1451   CK TOTAL U/L  --  31   TROPONIN T ng/mL 0.039*  --        CoAg  Results from last 7 days   Lab Units 01/23/20  2341   INR  1.19*   APTT seconds 22.4*       Creatinine Clearance  Estimated Creatinine Clearance: 23.1 mL/min (A) (by C-G formula based on SCr of 2.6 mg/dL (H)).    ABG        Radiology  Xr Chest 1 View    Result Date: 1/24/2020   1. Cardiomegaly with mild pulmonary vascular congestion. 2. Question small left pleural effusion.  Electronically Signed By-Ethan Reynolds On:1/24/2020 7:16 AM This report was finalized on [id number] by  Ethan Reynolds, .    Us Renal Bilateral    Result Date: 1/24/2020  1.Right renal cysts measuring up to 2.9 cm. 2.Otherwise, unremarkable sonographic appearance of the kidneys.  Electronically Signed By-Kimberly Green On:1/24/2020 3:54 PM This report was finalized on [id number] by  Kimberly Green, .              EKG      I personally viewed  and interpreted the patient's EKG/Telemetry data:    ECHOCARDIOGRAM:             STRESS MYOVIEW:    Cardiolite (Tc-99m Sestamibi) stress test    CARDIAC CATHETERIZATION:            OTHER:         Assessment/Plan     Active Problems:    Chest pain        ////    Impression  ===   - ischemic cardiomyopathy.       - status post single-chamber ICD 03/05/2010.  Have repositioning of the ventricular lead 07/09/2010.  Status post ICD shocks for ventricular tachycardia.  Patient is doing better with amiodarone and diuresis.  VT zone was reduced to 142 beats per minute     Status post ICD pulse generator replacement 9/6/2019.  Revision of pocket and repositioning of the pulse generator more medially and superiorly.        - status post successful ICD shock for ventricular tachycardia 09/24/2018      -history of ICD shock with successful defibrillation.  04/07/2017. Status post ICD shock  x2 for sustained ventricular tachycardia 02/05/2018.  Unsuccessful at 25 joules followed by successful 35 joules shock.Patient was asymptomatic.     Echocardiogram showed left ventricular dysfunction with ejection fraction of 40%.  Left atrial enlargement is present.  10/10/2018.  Michelle scan Cardiolite test is abnormal with inferior infarction and significant poster lateral ischemia.      -status post CABG 01/2000.    Status post stent to SVG to RCA 10/17/2018   cardiac catheterization 10/17/2018.  LV-gram not performed due to renal dysfunction.  Total LAD and RCA. .  Marginal branch has 30% disease.  Villarreal is atretic.  SVG to marginal branch is patent.  SVG to diagonal branch is patent and is filling LAD also.  SVG to PDA had 95% disease just distal to the landing site and had intervention.      - dizziness  Probably due to low blood pressure.  -improved with reduce dose of lisinopril.     - compensated congestive heart failure     - history of atrial fibrillation.  Patient has converted and was maintaining sinus rhythm.  Patient is in  atrial fibrillation     -status post left iliofemoral thrombectomy and endarterectomy 04/01/2017.  Status post stent to right common iliac artery and left external iliac artery 04/04/2017 P     -dyslipidemia hypertension and history of smoking.  Renal dysfunctionBUN 17 creatinine 1.9     -status post hemorrhoidectomy and hernia repair     -status post impending inferior myocardial infarction prior to RCA stent placement 1997.  ==  Plan  ========    Patient has been having increased shortness of breath  Having mild chest discomfort.  Troponin level is slightly elevated at 0.059.  Patient has renal dysfunction with BUN of 49 and creatinine of 2.67.  BUN 26 and creatinine 2.8 today  EKG showed atrial fibrillation  Renal consultation in progress.  Patient did not have any ICD shocks.  Medications were reviewed and updated.  I had a lengthy discussion recently with the patient and family regarding anticoagulation.  Risks and benefits pros and cons were discussed.  Patient is not anxious to start on anticoagulation since he has excessive bruising already.  Patient's ventricular rate with atrial fibrillation is faster.  Current medications include amiodarone aspirin atorvastatin Bumex Coreg Plavix Lovenox Synthroid.  Patient is extremely anxious and quite decided to not to stay in the hospital any longer.  Have discussed with patient's wife and attending physician for coordination of care.  Follow-up labs ordered.  Further plan will depend on patient's progress.  [[[[[[[[[[[[[        Amalia Leal MD  01/25/20  7:47 AM

## 2020-01-25 NOTE — PLAN OF CARE
Problem: Fall Risk (Adult)  Goal: Absence of Fall  Outcome: Ongoing (interventions implemented as appropriate)     Problem: Patient Care Overview  Goal: Individualization and Mutuality  Outcome: Ongoing (interventions implemented as appropriate)  Goal: Discharge Needs Assessment  Outcome: Ongoing (interventions implemented as appropriate)  Goal: Interprofessional Rounds/Family Conf  Outcome: Ongoing (interventions implemented as appropriate)  Goal: Plan of Care Review  Outcome: Ongoing (interventions implemented as appropriate)  Flowsheets (Taken 1/25/2020 6709)  Progress: no change  Plan of Care Reviewed With: patient  Outcome Summary: Pt had no new complaints overnight.     Problem: Cardiac: ACS (Acute Coronary Syndrome) (Adult)  Goal: Signs and Symptoms of Listed Potential Problems Will be Absent, Minimized or Managed (Cardiac: ACS)  Outcome: Ongoing (interventions implemented as appropriate)

## 2020-01-25 NOTE — CONSULTS
Referring Provider: Yassine Haines MD  Reason for Consultation:  Shortness of breath  Cardiomyopathy  Status post CABG    Patient Care Team:  Yassine Haines MD as PCP - General  Yassine Haines MD as PCP - Family Medicine  Amlaia Leal MD as PCP - Claims Attributed  Amalia Leal MD as Consulting Physician (Cardiology)  Trang West MD as Consulting Physician (Nephrology)    Chief complaint  Shortness of breath  Chest discomfort    Subjective .     History of present illness:  Luis E Rahman is a 80 y.o. male who presents with history of having increased shortness of breath cough and wheezing for last couple of weeks.  Patient had lower extremity swelling patient has increased diuretics without major response.  Patient continues to smoke.  Patient has been using albuterol inhaler.  Today while he was at [facility] he had increased shortness of breath and had chest discomfort and used albuterol inhaler without major relief.  Patient came to the emergency room and BNP was elevated and was admitted to the hospital.  Patient was started on diuretics.  No other associated aggravating or elevating factors..     ROS      Since I have last seen, the patient has been without any , palpitations, dizziness or syncope.  Denies having any headache ,abdominal pain ,nausea, vomiting , diarrhea constipation, loss of weight or loss of appetite.  Denies having any excessive bruising ,hematuria or blood in the stool.    Review of systems negative except as indicated      History  Past Medical History:   Diagnosis Date   • Atrial fibrillation (CMS/HCC)    • CHF (congestive heart failure) (CMS/HCC)    • Hyperlipidemia    • Hypertension    • Myocardial infarction (CMS/HCC)        Past Surgical History:   Procedure Laterality Date   • CARDIAC CATHETERIZATION     • CARDIAC ELECTROPHYSIOLOGY PROCEDURE N/A 9/6/2019    Procedure: ICD DC generator change;  Surgeon: Amalia Leal MD;  Location: [facility] INVASIVE  LOCATION;  Service: Cardiovascular   • CARDIAC ELECTROPHYSIOLOGY PROCEDURE N/A 9/6/2019    Procedure: Pocket Revision;  Surgeon: Amalia Leal MD;  Location: [facility] INVASIVE LOCATION;  Service: Cardiovascular   • CORONARY ANGIOPLASTY WITH STENT PLACEMENT     • CORONARY ARTERY BYPASS GRAFT     • PACEMAKER REPLACEMENT         History reviewed. No pertinent family history.    Social History     Tobacco Use   • Smoking status: Current Every Day Smoker     Packs/day: 0.50     Types: Cigarettes   • Smokeless tobacco: Never Used   Substance Use Topics   • Alcohol use: No     Frequency: Never   • Drug use: Not on file        Medications Prior to Admission   Medication Sig Dispense Refill Last Dose   • amiodarone (PACERONE) 200 MG tablet Take 200 mg by mouth Daily.      • atorvastatin (LIPITOR) 10 MG tablet Take 10 mg by mouth Daily.      • budesonide-formoterol (SYMBICORT) 160-4.5 MCG/ACT inhaler Inhale 2 puffs 2 (Two) Times a Day.      • carvedilol (COREG) 12.5 MG tablet Take 12.5 mg by mouth 2 (Two) Times a Day.   Patient Taking Differently at Unknown time   • clopidogrel (PLAVIX) 75 MG tablet Take 75 mg by mouth Daily.      • furosemide (LASIX) 40 MG tablet Take 40 mg by mouth 2 (Two) Times a Day. Tues, Thur, Sat, Sun      • furosemide (LASIX) 40 MG tablet Take 40 mg by mouth Daily. Mon, Wed, Fri      • albuterol sulfate HFA (VENTOLIN HFA) 108 (90 Base) MCG/ACT inhaler Inhale 1 puff Every 6 (Six) Hours As Needed for Shortness of Air.   Not Taking   • aspirin (ASPIR-LOW) 81 MG EC tablet Take 81 mg by mouth Daily.   Taking   • FEROSUL 325 (65 Fe) MG tablet Take 325 mg by mouth Daily With Breakfast.   Taking   • spironolactone (ALDACTONE) 25 MG tablet Take 25 mg by mouth Daily.   Taking   • vitamin D (ERGOCALCIFEROL) 05414 units capsule capsule Take 50,000 Units by mouth Every 14 (Fourteen) Days.   Taking         Patient has no known allergies.    Scheduled Meds:  amiodarone 200 mg Oral Daily   aspirin 81 mg Oral  "Daily   atorvastatin 10 mg Oral Daily   budesonide-formoterol 2 puff Inhalation BID - RT   bumetanide 1 mg Intravenous Q12H   carvedilol 3.125 mg Oral BID   clopidogrel 75 mg Oral Daily   enoxaparin 30 mg Subcutaneous Q24H   sodium chloride 10 mL Intravenous Q12H     Continuous Infusions:   PRN Meds:.[COMPLETED] Insert peripheral IV **AND** sodium chloride  •  sodium chloride    Objective     VITAL SIGNS  Vitals:    01/24/20 0657 01/24/20 0746 01/24/20 1932 01/24/20 1937   BP: 97/69 108/67     Pulse: 93 74 72 72   Resp: 19 16 16 16   Temp:  97.3 °F (36.3 °C)     TempSrc:  Oral     SpO2: 96% 98% 96% 96%   Weight:  76.1 kg (167 lb 12.3 oz)     Height:  180.3 cm (71\")         Flowsheet Rows      First Filed Value   Admission Height  180.3 cm (71\") Documented at 01/23/2020 2328   Admission Weight  77.1 kg (170 lb) Documented at 01/23/2020 2328            Intake/Output Summary (Last 24 hours) at 1/24/2020 2010  Last data filed at 1/24/2020 1737  Gross per 24 hour   Intake 1020 ml   Output 625 ml   Net 395 ml        TELEMETRY:    Physical Exam:  The patient is alert, oriented and in no distress.  Vital signs as noted above.  Head and neck revealed no carotid bruits or jugular venous distention.  No thyromegaly or lymph adenopathy is present  Lungs clear.  No wheezing.  Breath sounds are normal bilaterally.  Heart normal first and second heart sounds.No murmur.  No precordial rub is present.  No gallop is present.  Abdomen soft and nontender.  No organomegaly is present.  Extremities with good peripheral pulses without any pedal edema.  Skin warm and dry.  Musculoskeletal system is grossly normal  CNS grossly normal      Results Review:   I reviewed the patient's new clinical results.  Lab Results (last 24 hours)     Procedure Component Value Units Date/Time    Eosinophil Smear - Urine, Urine, Clean Catch [[id number]]  (Normal) Collected:  01/24/20 1740    Specimen:  Urine, Clean Catch Updated:  01/24/20 1941     Eosinophil " Smear 0 % EOS/100 Cells     Urinalysis With Culture If Indicated - Urine, Clean Catch [[id number]]  (Normal) Collected:  01/24/20 1740    Specimen:  Urine, Clean Catch Updated:  01/24/20 1904     Color, UA Yellow     Appearance, UA Clear     pH, UA 6.5     Specific Gravity, UA 1.010     Glucose, UA Negative     Ketones, UA Negative     Bilirubin, UA Negative     Blood, UA Negative     Protein, UA Negative     Leuk Esterase, UA Negative     Nitrite, UA Negative     Urobilinogen, UA 0.2 E.U./dL    Narrative:       Urine microscopic not indicated.    CK [[id number]]  (Normal) Collected:  01/24/20 1451    Specimen:  Blood Updated:  01/24/20 1606     Creatine Kinase 31 U/L     Uric Acid [[id number]]  (Abnormal) Collected:  01/24/20 1451    Specimen:  Blood Updated:  01/24/20 1606     Uric Acid 12.6 mg/dL     Blood Culture - Blood, Arm, Left [[id number]] Collected:  01/24/20 0022    Specimen:  Blood from Arm, Left Updated:  01/24/20 1230     Blood Culture No growth at less than 24 hours    Blood Culture - Blood, Arm, Left [[id number]] Collected:  01/23/20 2349    Specimen:  Blood from Arm, Left Updated:  01/24/20 1201     Blood Culture No growth at less than 24 hours    Lactic Acid, Reflex [[id number]]  (Normal) Collected:  01/24/20 0309    Specimen:  Blood Updated:  01/24/20 0332     Lactate 1.4 mmol/L     Lactic Acid, Reflex Timer (This will reflex a repeat order 3-3:15 hours after ordered.) [[id number]] Collected:  01/23/20 2352    Specimen:  Blood Updated:  01/24/20 0300     Extra Tube Hold for add-ons.     Comment: Auto resulted.       Extra Tubes [[id number]] Collected:  01/23/20 2341    Specimen:  Blood, Venous Line Updated:  01/24/20 0045    Narrative:       The following orders were created for panel order Extra Tubes.  Procedure                               Abnormality         Status                     ---------                               -----------         ------                     [facility] - [facility][[id number]]                                    Final result                 Please view results for these tests on the individual orders.    Gold Top - [facility] [[id number]] Collected:  01/23/20 2341    Specimen:  Blood Updated:  01/24/20 0045     Extra Tube Hold for add-ons.     Comment: Auto resulted.       BNP [[id number]]  (Abnormal) Collected:  01/23/20 2341    Specimen:  Blood Updated:  01/24/20 0024     proBNP 34,244.0 pg/mL     Narrative:       Among patients with dyspnea, NT-proBNP is highly sensitive for the detection of acute congestive heart failure. In addition NT-proBNP of <300 pg/ml effectively rules out acute congestive heart failure with 99% negative predictive value.    Results may be falsely decreased if patient taking Biotin.      Protime-INR [[id number]]  (Abnormal) Collected:  01/23/20 2341    Specimen:  Blood Updated:  01/24/20 0021     Protime 12.2 Seconds      INR 1.19    aPTT [[id number]]  (Abnormal) Collected:  01/23/20 2341    Specimen:  Blood Updated:  01/24/20 0021     PTT 22.4 seconds     Troponin [[id number]]  (Abnormal) Collected:  01/23/20 2341    Specimen:  Blood Updated:  01/24/20 0018     Troponin T 0.059 ng/mL     Narrative:       Troponin T Reference Range:  <= 0.03 ng/mL-   Negative for AMI  >0.03 ng/mL-     Abnormal for myocardial necrosis.  Clinicians would have to utilize clinical acumen, EKG, Troponin and serial changes to determine if it is an Acute Myocardial Infarction or myocardial injury due to an underlying chronic condition.       Results may be falsely decreased if patient taking Biotin.      Basic Metabolic Panel [[id number]]  (Abnormal) Collected:  01/23/20 2341    Specimen:  Blood Updated:  01/24/20 0013     Glucose 124 mg/dL      BUN 49 mg/dL      Creatinine 2.67 mg/dL      Sodium 141 mmol/L      Potassium 4.7 mmol/L      Chloride 98 mmol/L      CO2 29.0 mmol/L      Calcium 9.4 mg/dL      eGFR Non African Amer 23 mL/min/1.73      BUN/Creatinine Ratio 18.4     Anion Gap 14.0 mmol/L      Narrative:       GFR Normal >60  Chronic Kidney Disease <60  Kidney Failure <15      POC Lactate [[id number]]  (Abnormal) Collected:  01/23/20 2352    Specimen:  Blood Updated:  01/23/20 2353     Lactate 2.1 mmol/L      Comment: Serial Number: [id number]:  [id number]       CBC & Differential [[id number]] Collected:  01/23/20 2341    Specimen:  Blood Updated:  01/23/20 2349    Narrative:       The following orders were created for panel order CBC & Differential.  Procedure                               Abnormality         Status                     ---------                               -----------         ------                     CBC Auto Differential[[id number]]        Abnormal            Final result                 Please view results for these tests on the individual orders.    CBC Auto Differential [[id number]]  (Abnormal) Collected:  01/23/20 2341    Specimen:  Blood Updated:  01/23/20 2349     WBC 8.60 10*3/mm3      RBC 4.42 10*6/mm3      Hemoglobin 15.4 g/dL      Hematocrit 46.2 %      .7 fL      MCH 34.9 pg      MCHC 33.4 g/dL      RDW 15.4 %      RDW-SD 57.3 fl      MPV 10.0 fL      Platelets 135 10*3/mm3      Neutrophil % 84.2 %      Lymphocyte % 7.6 %      Monocyte % 6.9 %      Eosinophil % 0.9 %      Basophil % 0.4 %      Neutrophils, Absolute 7.30 10*3/mm3      Lymphocytes, Absolute 0.70 10*3/mm3      Monocytes, Absolute 0.60 10*3/mm3      Eosinophils, Absolute 0.10 10*3/mm3      Basophils, Absolute 0.00 10*3/mm3      nRBC 0.4 /100 WBC           Imaging Results (Last 24 Hours)     Procedure Component Value Units Date/Time    US Renal Bilateral [[id number]] Collected:  01/24/20 1552     Updated:  01/24/20 1556    Narrative:       DATE OF EXAM:  1/24/2020 3:10 PM     PROCEDURE:  US RENAL BILATERAL-     INDICATIONS:  ARF/CKD; R07.9-Chest pain, unspecified; R06.00-Dyspnea, unspecified;  I50.9-Heart failure, unspecified; N17.9-Acute kidney failure,  unspecified; N18.9-Chronic kidney disease,  unspecified; R79.89-Other  specified abnormal findings of blood chemistry     COMPARISON:  Renal ultrasound 04/02/2017.     TECHNIQUE:   Grayscale and color Doppler ultrasound evaluation of the kidneys and  urinary bladder was performed.     FINDINGS:  The right kidney measures 9.9 x 6.2 x 5.5 cm. Right renal cortical  echogenicity appears within normal limits. 2 right renal cysts measure  2.9 and 1.4 cm each. No hydronephrosis.     The left kidney measures 9.1 x 4.2 x 4.6 cm. Left renal cortical  echogenicity appears within normal limits. No renal mass or  hydronephrosis is seen.     The urinary bladder is distended and unremarkable. Estimated bladder  volume is 201 ml.        Impression:       1.Right renal cysts measuring up to 2.9 cm.  2.Otherwise, unremarkable sonographic appearance of the kidneys.     Electronically Signed By-Kimberly Green On:1/24/2020 3:54 PM  This report was finalized on [id number] by  Kimberly Green, .    XR Chest 1 View [[id number]] Collected:  01/24/20 0715     Updated:  01/24/20 0718    Narrative:       DATE OF EXAM:  1/23/2020 11:21 PM     PROCEDURE:  XR CHEST 1 VW-     INDICATIONS:  chest pain     COMPARISON:  4/28/2019     TECHNIQUE:   Single radiographic view of the chest was obtained.     FINDINGS:  Left subclavian AICD device is again noted. Sternotomy wires overlie the  midline. There is obscuration of the left base due to cardiomegaly.  Marked cardiomegaly is again present. There is mild pulmonary vascular  congestion. Question small left pleural effusion. Retrocardiac density  suggests a hiatal hernia.       Impression:          1. Cardiomegaly with mild pulmonary vascular congestion.  2. Question small left pleural effusion.     Electronically Signed By-Ethan Reynolds On:1/24/2020 7:16 AM  This report was finalized on [id number] by  Ethan Reynolds, .      LAB RESULTS (LAST 7 DAYS)    CBC  Results from last 7 days   Lab Units 01/23/20  2341   WBC 10*3/mm3 8.60   RBC 10*6/mm3  4.42   HEMOGLOBIN g/dL 15.4   HEMATOCRIT % 46.2   MCV fL 104.7*   PLATELETS 10*3/mm3 135*       BMP  Results from last 7 days   Lab Units 01/23/20  2341   SODIUM mmol/L 141   POTASSIUM mmol/L 4.7   CHLORIDE mmol/L 98   CO2 mmol/L 29.0   BUN mg/dL 49*   CREATININE mg/dL 2.67*   GLUCOSE mg/dL 124*       CMP Results from last 7 days   Lab Units 01/23/20  2341   SODIUM mmol/L 141   POTASSIUM mmol/L 4.7   CHLORIDE mmol/L 98   CO2 mmol/L 29.0   BUN mg/dL 49*   CREATININE mg/dL 2.67*   GLUCOSE mg/dL 124*         BNP        TROPONIN  Results from last 7 days   Lab Units 01/24/20  1451 01/23/20  2341   CK TOTAL U/L 31  --    TROPONIN T ng/mL  --  0.059*       CoAg  Results from last 7 days   Lab Units 01/23/20  2341   INR  1.19*   APTT seconds 22.4*       Creatinine Clearance  Estimated Creatinine Clearance: 23 mL/min (A) (by C-G formula based on SCr of 2.67 mg/dL (H)).    ABG        Radiology  Xr Chest 1 View    Result Date: 1/24/2020   1. Cardiomegaly with mild pulmonary vascular congestion. 2. Question small left pleural effusion.  Electronically Signed By-Ethan Reynolds On:1/24/2020 7:16 AM This report was finalized on [id number] by  Ethan Reynolds, .    Us Renal Bilateral    Result Date: 1/24/2020  1.Right renal cysts measuring up to 2.9 cm. 2.Otherwise, unremarkable sonographic appearance of the kidneys.  Electronically Signed ByDanilo Green On:1/24/2020 3:54 PM This report was finalized on [id number] by  Kimberly Green, .              EKG          I personally viewed and interpreted the patient's EKG/Telemetry data: Atrial fibrillation  ECHOCARDIOGRAM:                 Cardiolite (Tc-99m Sestamibi) stress test      OTHER:     Assessment/Plan     Active Problems:    Chest pain    ////    Impression  ===   - ischemic cardiomyopathy.       - status post single-chamber ICD 03/05/2010.  Have repositioning of the ventricular lead 07/09/2010.  Status post ICD shocks for ventricular tachycardia.  Patient is doing better  with amiodarone and diuresis.  VT zone was reduced to 142 beats per minute     Status post ICD pulse generator replacement 9/6/2019.  Revision of pocket and repositioning of the pulse generator more medially and superiorly.        - status post successful ICD shock for ventricular tachycardia 09/24/2018      -history of ICD shock with successful defibrillation.  04/07/2017. Status post ICD shock  x2 for sustained ventricular tachycardia 02/05/2018.  Unsuccessful at 25 joules followed by successful 35 joules shock.Patient was asymptomatic.     Echocardiogram showed left ventricular dysfunction with ejection fraction of 40%.  Left atrial enlargement is present.  10/10/2018.  Michelle scan Cardiolite test is abnormal with inferior infarction and significant poster lateral ischemia.      -status post CABG 01/2000.    Status post stent to SVG to RCA 10/17/2018   cardiac catheterization 10/17/2018.  LV-gram not performed due to renal dysfunction.  Total LAD and RCA. .  Marginal branch has 30% disease.  Villarreal is atretic.  SVG to marginal branch is patent.  SVG to diagonal branch is patent and is filling LAD also.  SVG to PDA had 95% disease just distal to the landing site and had intervention.      - dizziness  Probably due to low blood pressure.  -improved with reduce dose of lisinopril.     - compensated congestive heart failure     - history of atrial fibrillation.  Patient has converted and was maintaining sinus rhythm.  Patient is in atrial fibrillation     -status post left iliofemoral thrombectomy and endarterectomy 04/01/2017.  Status post stent to right common iliac artery and left external iliac artery 04/04/2017 P     -dyslipidemia hypertension and history of smoking.  Renal dysfunctionBUN 17 creatinine 1.9     -status post hemorrhoidectomy and hernia repair     -status post impending inferior myocardial infarction prior to RCA stent placement 1997.  ==  Plan  ========  Patient has been having increased shortness of  breath  Having mild chest discomfort.  Troponin level is slightly elevated at 0.059.    Patient has renal dysfunction with BUN of 49 and creatinine of 2.67.  EKG showed atrial fibrillation  Renal consultation in progress.  Patient did not have any ICD shocks.  Medications were reviewed and updated.  I had a lengthy discussion recently with the patient and family regarding anticoagulation.  Risks and benefits pros and cons were discussed.  Patient is not anxious to start on anticoagulation since he has excessive bruising already.  Patient's ventricular rate with atrial fibrillation is faster.  Recently increased carvedilol to 12.5 mg p.o. twice daily or 1-1/2 tablets twice a day.  Hopefully rate is better controlled.  Nephrologist would prefer him to have less diuretic due to renal dysfunction.  Follow-up labs ordered.  Further plan will depend on patient's progress.  [[[[[[[[[[[[[       Amalia Leal MD  01/24/20  8:10 PM

## 2020-01-25 NOTE — DISCHARGE SUMMARY
Date of Discharge:  1/25/2020    Discharge Diagnosis:      Angina Pectoris with elevated troponin  Acute on chronic systolic failure LVEF of 40%, compensated congestive heart failure at the time od discharge  Ischemic cardiomyopathy.  Atrial fibrillation and controlled ventricular response with valvular disease  Acute exacerbation of COPD  Continuous tobacco abuse  Acute Kidney Failure/Acute kidney injury/chronic kidney disease Stage 3  Elevated lactic acid  Essential hypertension  Mixed hyperlipidemia  Status post single-chamber ICD 03/05/2010.    Michelle scan Cardiolite test is abnormal with inferior infarction and significant poster lateral ischemia.   Status post CABG 01/2000.    Status post stent to SVG to RCA 10/17/2018  Satus post left iliofemoral thrombectomy and endarterectomy 04/01/2017.  Status post stent to right common iliac artery and left external iliac artery 04/04/2017   Anemia of CKD stable  Primary osteoarthritis of  multiple joints  Vitamin D deficiency  Tobacco use disorder  Hypocalcemia  Defient Personality and noncomplaint      Presenting Problem/History of Present Illness  Active Hospital Problems    Diagnosis  POA   • Chest pain [R07.9]  Yes      Resolved Hospital Problems   No resolved problems to display.          Hospital Course   Patient is a 80 y.o. male was in his Patient was in his usual state of health 2 weeks ago.  The patient says about 2 weeks ago he started having some cough, expectoration increased shortness of breath.  And since then he has increased swelling in both lower extremities.  He has been increasing his diuretics.  Unortunately he continues to smoke.  Says that he has been using is his albuterol inhaler frequently than before.  Today while he was at [employer] he had some increased shortness of breath.  When coming out to the cold air had severe shortness of breath and sternal chest pain started using his albuterol inhaler he was unable to take deep breaths.   Came to the emergency room and was evaluated found that his BNP is elevated as well as his troponin.  Been started on nitroglycerin patch and given diuretics.  I saw him this morning he was chest pain-free.  Not have any potation or fluttering.  Was laying in the bed at 0 degrees angle doubt any orthopnea.  He was speaking full sentences.  He definitely has a cough with some expectoration sputum is green in color.  Denies any hemoptysis.  He denies any painful breathing.  He will has some substernal discomfort.  He does not have any difficulty swallowing.  He does not have any reflux.  Patient was admitted to the emergency room.  He was seen by cardiology and nephrology.  Patient's medications were adjusted.  She was advised to stay in the hospital unfortunately to go home.  Was discharged to home with instructions to follow closely with  next week.    Procedures Performed         Consults:   Consults     Date and Time Order Name Status Description    1/24/2020 0951 Inpatient Nephrology Consult Completed     1/24/2020 0951 Inpatient Cardiology Consult Completed     1/24/2020 0831 Inpatient Cardiology Consult Completed     1/24/2020 0028 Family Medicine Consult Completed           Pertinent Test Results:    Lab Results (most recent)     Procedure Component Value Units Date/Time    CBC & Differential [[id number]] Collected:  01/25/20 0320    Specimen:  Blood Updated:  01/25/20 0653    Narrative:       The following orders were created for panel order CBC & Differential.  Procedure                               Abnormality         Status                     ---------                               -----------         ------                     Manual Differential[[id number]]                                                         CBC Auto Differential[[id number]]        Abnormal            Final result                 Please view results for these tests on the individual orders.    CBC Auto Differential  [[id number]]  (Abnormal) Collected:  01/25/20 0320    Specimen:  Blood Updated:  01/25/20 0653     WBC 7.70 10*3/mm3      RBC 3.85 10*6/mm3      Hemoglobin 13.8 g/dL      Hematocrit 40.9 %      .4 fL      MCH 35.8 pg      MCHC 33.7 g/dL      RDW 15.3 %      RDW-SD 56.9 fl      MPV 10.4 fL      Platelets 111 10*3/mm3      Neutrophil % 81.5 %      Lymphocyte % 9.1 %      Monocyte % 8.1 %      Eosinophil % 1.1 %      Basophil % 0.2 %      Neutrophils, Absolute 6.30 10*3/mm3      Lymphocytes, Absolute 0.70 10*3/mm3      Monocytes, Absolute 0.60 10*3/mm3      Eosinophils, Absolute 0.10 10*3/mm3      Basophils, Absolute 0.00 10*3/mm3      nRBC 0.3 /100 WBC     Narrative:       Appended report. These results have been appended to a previously verified report.    Troponin [[id number]]  (Abnormal) Collected:  01/25/20 0320    Specimen:  Blood Updated:  01/25/20 0523     Troponin T 0.039 ng/mL     Narrative:       Troponin T Reference Range:  <= 0.03 ng/mL-   Negative for AMI  >0.03 ng/mL-     Abnormal for myocardial necrosis.  Clinicians would have to utilize clinical acumen, EKG, Troponin and serial changes to determine if it is an Acute Myocardial Infarction or myocardial injury due to an underlying chronic condition.       Results may be falsely decreased if patient taking Biotin.      Phosphorus [[id number]]  (Normal) Collected:  01/25/20 0320    Specimen:  Blood Updated:  01/25/20 0509     Phosphorus 4.4 mg/dL     Comprehensive Metabolic Panel [[id number]]  (Abnormal) Collected:  01/25/20 0320    Specimen:  Blood Updated:  01/25/20 0509     Glucose 110 mg/dL      BUN 48 mg/dL      Creatinine 2.60 mg/dL      Sodium 138 mmol/L      Potassium 4.5 mmol/L      Chloride 98 mmol/L      CO2 25.0 mmol/L      Calcium 9.3 mg/dL      Total Protein 5.7 g/dL      Albumin 3.30 g/dL      ALT (SGPT) 25 U/L      AST (SGOT) 21 U/L      Alkaline Phosphatase 84 U/L      Total Bilirubin 0.8 mg/dL      eGFR Non African Amer 24 mL/min/1.73       Globulin 2.4 gm/dL      A/G Ratio 1.4 g/dL      BUN/Creatinine Ratio 18.5     Anion Gap 15.0 mmol/L     Narrative:       GFR Normal >60  Chronic Kidney Disease <60  Kidney Failure <15      Magnesium [[id number]]  (Abnormal) Collected:  01/25/20 0320    Specimen:  Blood Updated:  01/25/20 0509     Magnesium 2.6 mg/dL     BNP [[id number]]  (Abnormal) Collected:  01/25/20 0320    Specimen:  Blood Updated:  01/25/20 0509     proBNP 30,044.0 pg/mL     Narrative:       Among patients with dyspnea, NT-proBNP is highly sensitive for the detection of acute congestive heart failure. In addition NT-proBNP of <300 pg/ml effectively rules out acute congestive heart failure with 99% negative predictive value.    Results may be falsely decreased if patient taking Biotin.      TSH [[id number]]  (Abnormal) Collected:  01/25/20 0320    Specimen:  Blood Updated:  01/25/20 0509     TSH 5.480 uIU/mL     Calcium, Ionized [[id number]]  (Abnormal) Collected:  01/25/20 0320    Specimen:  Blood Updated:  01/25/20 0443     Ionized Calcium 1.17 mmol/L     Blood Culture - Blood, Arm, Left [[id number]] Collected:  01/24/20 0022    Specimen:  Blood from Arm, Left Updated:  01/25/20 0030     Blood Culture No growth at 24 hours    Blood Culture - Blood, Arm, Left [[id number]] Collected:  01/23/20 2349    Specimen:  Blood from Arm, Left Updated:  01/25/20 0000     Blood Culture No growth at 24 hours    Eosinophil Smear - Urine, Urine, Clean Catch [[id number]]  (Normal) Collected:  01/24/20 1740    Specimen:  Urine, Clean Catch Updated:  01/24/20 1941     Eosinophil Smear 0 % EOS/100 Cells     Urinalysis With Culture If Indicated - Urine, Clean Catch [[id number]]  (Normal) Collected:  01/24/20 1740    Specimen:  Urine, Clean Catch Updated:  01/24/20 1904     Color, UA Yellow     Appearance, UA Clear     pH, UA 6.5     Specific Gravity, UA 1.010     Glucose, UA Negative     Ketones, UA Negative     Bilirubin, UA Negative     Blood, UA Negative      Protein, UA Negative     Leuk Esterase, UA Negative     Nitrite, UA Negative     Urobilinogen, UA 0.2 E.U./dL    Narrative:       Urine microscopic not indicated.    CK [[id number]]  (Normal) Collected:  01/24/20 1451    Specimen:  Blood Updated:  01/24/20 1606     Creatine Kinase 31 U/L     Uric Acid [[id number]]  (Abnormal) Collected:  01/24/20 1451    Specimen:  Blood Updated:  01/24/20 1606     Uric Acid 12.6 mg/dL     Lactic Acid, Reflex [[id number]]  (Normal) Collected:  01/24/20 0309    Specimen:  Blood Updated:  01/24/20 0332     Lactate 1.4 mmol/L     Lactic Acid, Reflex Timer (This will reflex a repeat order 3-3:15 hours after ordered.) [[id number]] Collected:  01/23/20 2352    Specimen:  Blood Updated:  01/24/20 0300     Extra Tube Hold for add-ons.     Comment: Auto resulted.       Extra Tubes [[id number]] Collected:  01/23/20 2341    Specimen:  Blood, Venous Line Updated:  01/24/20 0045    Narrative:       The following orders were created for panel order Extra Tubes.  Procedure                               Abnormality         Status                     ---------                               -----------         ------                     Gold Top - SST[[id number]]                                   Final result                 Please view results for these tests on the individual orders.    Gold Top - SST [[id number]] Collected:  01/23/20 2341    Specimen:  Blood Updated:  01/24/20 0045     Extra Tube Hold for add-ons.     Comment: Auto resulted.       BNP [[id number]]  (Abnormal) Collected:  01/23/20 2341    Specimen:  Blood Updated:  01/24/20 0024     proBNP 34,244.0 pg/mL     Narrative:       Among patients with dyspnea, NT-proBNP is highly sensitive for the detection of acute congestive heart failure. In addition NT-proBNP of <300 pg/ml effectively rules out acute congestive heart failure with 99% negative predictive value.    Results may be falsely decreased if patient taking Biotin.      Protime-INR  [[id number]]  (Abnormal) Collected:  01/23/20 2341    Specimen:  Blood Updated:  01/24/20 0021     Protime 12.2 Seconds      INR 1.19    aPTT [[id number]]  (Abnormal) Collected:  01/23/20 2341    Specimen:  Blood Updated:  01/24/20 0021     PTT 22.4 seconds     Troponin [[id number]]  (Abnormal) Collected:  01/23/20 2341    Specimen:  Blood Updated:  01/24/20 0018     Troponin T 0.059 ng/mL     Narrative:       Troponin T Reference Range:  <= 0.03 ng/mL-   Negative for AMI  >0.03 ng/mL-     Abnormal for myocardial necrosis.  Clinicians would have to utilize clinical acumen, EKG, Troponin and serial changes to determine if it is an Acute Myocardial Infarction or myocardial injury due to an underlying chronic condition.       Results may be falsely decreased if patient taking Biotin.      Basic Metabolic Panel [[id number]]  (Abnormal) Collected:  01/23/20 2341    Specimen:  Blood Updated:  01/24/20 0013     Glucose 124 mg/dL      BUN 49 mg/dL      Creatinine 2.67 mg/dL      Sodium 141 mmol/L      Potassium 4.7 mmol/L      Chloride 98 mmol/L      CO2 29.0 mmol/L      Calcium 9.4 mg/dL      eGFR Non African Amer 23 mL/min/1.73      BUN/Creatinine Ratio 18.4     Anion Gap 14.0 mmol/L     Narrative:       GFR Normal >60  Chronic Kidney Disease <60  Kidney Failure <15      POC Lactate [[id number]]  (Abnormal) Collected:  01/23/20 2352    Specimen:  Blood Updated:  01/23/20 2353     Lactate 2.1 mmol/L      Comment: Serial Number: [id number]:  [id number]       CBC & Differential [[id number]] Collected:  01/23/20 2341    Specimen:  Blood Updated:  01/23/20 2349    Narrative:       The following orders were created for panel order CBC & Differential.  Procedure                               Abnormality         Status                     ---------                               -----------         ------                     CBC Auto Differential[[id number]]        Abnormal            Final result                 Please view  results for these tests on the individual orders.    CBC Auto Differential [[id number]]  (Abnormal) Collected:  01/23/20 2341    Specimen:  Blood Updated:  01/23/20 2349     WBC 8.60 10*3/mm3      RBC 4.42 10*6/mm3      Hemoglobin 15.4 g/dL      Hematocrit 46.2 %      .7 fL      MCH 34.9 pg      MCHC 33.4 g/dL      RDW 15.4 %      RDW-SD 57.3 fl      MPV 10.0 fL      Platelets 135 10*3/mm3      Neutrophil % 84.2 %      Lymphocyte % 7.6 %      Monocyte % 6.9 %      Eosinophil % 0.9 %      Basophil % 0.4 %      Neutrophils, Absolute 7.30 10*3/mm3      Lymphocytes, Absolute 0.70 10*3/mm3      Monocytes, Absolute 0.60 10*3/mm3      Eosinophils, Absolute 0.10 10*3/mm3      Basophils, Absolute 0.00 10*3/mm3      nRBC 0.4 /100 WBC            Imaging Results (Last 72 Hours)     Procedure Component Value Units Date/Time    US Renal Bilateral [[id number]] Collected:  01/24/20 1552     Updated:  01/24/20 1556    Narrative:       DATE OF EXAM:  1/24/2020 3:10 PM     PROCEDURE:  US RENAL BILATERAL-     INDICATIONS:  ARF/CKD; R07.9-Chest pain, unspecified; R06.00-Dyspnea, unspecified;  I50.9-Heart failure, unspecified; N17.9-Acute kidney failure,  unspecified; N18.9-Chronic kidney disease, unspecified; R79.89-Other  specified abnormal findings of blood chemistry     COMPARISON:  Renal ultrasound 04/02/2017.     TECHNIQUE:   Grayscale and color Doppler ultrasound evaluation of the kidneys and  urinary bladder was performed.     FINDINGS:  The right kidney measures 9.9 x 6.2 x 5.5 cm. Right renal cortical  echogenicity appears within normal limits. 2 right renal cysts measure  2.9 and 1.4 cm each. No hydronephrosis.     The left kidney measures 9.1 x 4.2 x 4.6 cm. Left renal cortical  echogenicity appears within normal limits. No renal mass or  hydronephrosis is seen.     The urinary bladder is distended and unremarkable. Estimated bladder  volume is 201 ml.        Impression:       1.Right renal cysts measuring up to 2.9  cm.  2.Otherwise, unremarkable sonographic appearance of the kidneys.     Electronically Signed By-Kimberly Green On:1/24/2020 3:54 PM  This report was finalized on [id number] by  Kimberly Green, .    XR Chest 1 View [[id number]] Collected:  01/24/20 0715     Updated:  01/24/20 0718    Narrative:       DATE OF EXAM:  1/23/2020 11:21 PM     PROCEDURE:  XR CHEST 1 VW-     INDICATIONS:  chest pain     COMPARISON:  4/28/2019     TECHNIQUE:   Single radiographic view of the chest was obtained.     FINDINGS:  Left subclavian AICD device is again noted. Sternotomy wires overlie the  midline. There is obscuration of the left base due to cardiomegaly.  Marked cardiomegaly is again present. There is mild pulmonary vascular  congestion. Question small left pleural effusion. Retrocardiac density  suggests a hiatal hernia.       Impression:          1. Cardiomegaly with mild pulmonary vascular congestion.  2. Question small left pleural effusion.     Electronically Signed By-Ethan Reynolds On:1/24/2020 7:16 AM  This report was finalized on [id number] by  Ethan Reynolds, .                  ECG/EMG Results (last 24 hours)     Procedure Component Value Units Date/Time    Adult Transthoracic Echo Complete W/ Cont if Necessary Per Protocol [[id number]] Collected:  01/24/20 1545     Updated:  01/24/20 1626      CV ECHO MAR - RVDD 3.1 cm      IVSd 1.6 cm      LVIDd 5.9 cm      LVIDs 5.8 cm      LVPWd 1.5 cm      IVS/LVPW 1.0     FS 2.5 %      EDV(Teich) 173.2 ml      ESV(Teich) 163.5 ml      EF(Teich) 5.6 %      EDV(cubed) 205.4 ml      ESV(cubed) 190.4 ml      EF(cubed) 7.3 %      LV mass(C)d 448.2 grams      SV(Teich) 9.8 ml      SV(cubed) 15.0 ml      Ao root diam 4.0 cm      Ao root area 12.7 cm^2      ACS 2.5 cm      asc Aorta Diam 3.8 cm      LVOT diam 2.3 cm      LVOT area 4.3 cm^2      RVOT diam 3.1 cm      RVOT area 7.4 cm^2      EDV(MOD-sp4) 116.0 ml      ESV(MOD-sp4) 97.3 ml      EF(MOD-sp4) 16.1 %      SV(MOD-sp4) 18.7 ml       MV V2 max 57.7 cm/sec      MV max PG 1.3 mmHg      MV V2 mean 34.8 cm/sec      MV mean PG 0.54 mmHg      MV V2 VTI 16.2 cm      MVA(VTI) 2.7 cm^2      Ao pk lázaro 88.5 cm/sec      Ao max PG 3.1 mmHg      Ao max PG (full) 1.5 mmHg      Ao V2 mean 60.8 cm/sec      Ao mean PG 1.7 mmHg      Ao mean PG (full) 1.0 mmHg      Ao V2 VTI 14.0 cm      GENARO(I,A) 3.1 cm^2      GENARO(I,D) 3.1 cm^2      GENARO(V,A) 3.1 cm^2      GENARO(V,D) 3.1 cm^2      AI max lázaro 376.3 cm/sec      AI max PG 56.6 mmHg      AI dec slope 161.4 cm/sec^2      AI dec time 2.3 sec      AI P1/2t 682.8 msec      LV V1 max PG 1.6 mmHg      LV V1 mean PG 0.64 mmHg      LV V1 max 64.1 cm/sec      LV V1 mean 35.4 cm/sec      LV V1 VTI 10.1 cm      MR max lázaro 350.0 cm/sec      MR max PG 49.0 mmHg      SV(Ao) 178.8 ml      SV(LVOT) 43.3 ml      SV(RVOT) 56.9 ml      PA V2 max 59.8 cm/sec      PA max PG 1.4 mmHg      PA max PG (full) 0.83 mmHg      PA V2 mean 40.3 cm/sec      PA mean PG 0.77 mmHg      PA mean PG (full) 0.39 mmHg      PA V2 VTI 9.5 cm      PVA(I,A) 6.0 cm^2      BH CV ECHO MAR - PVA(I,D) 6.0 cm^2      BH CV ECHO MAR - PVA(V,A) 4.8 cm^2      BH CV ECHO MAR - PVA(V,D) 4.8 cm^2      PA acc time 0.08 sec      PI end-d lázaro 126.2 cm/sec      PI max lázaro 214.4 cm/sec      PI max PG 18.4 mmHg      RV V1 max PG 0.6 mmHg      RV V1 mean PG 0.38 mmHg      RV V1 max 38.7 cm/sec      RV V1 mean 29.2 cm/sec      RV V1 VTI 7.7 cm      TR max lázaro 236.4 cm/sec      RVSP(TR) 37.3 mmHg      RAP systole 15.0 mmHg      PA pr(Accel) 43.7 mmHg      Qp/Qs 1.3     Target HR (85%) 117 bpm      Max. Pred. HR (100%) 138 bpm      EF(MOD-bp) 16.0 %      LA dimension(2D) 5.5 cm     ECG 12 Lead [[id number]] Collected:  01/25/20 0010     Updated:  01/25/20 0013    Narrative:       HEART RATE= 108  bpm  RR Interval= 552  ms  NJ Interval= 148  ms  P Horizontal Axis=   deg  P Front Axis= 0  deg  QRSD Interval= 141  ms  QT Interval= 383  ms  QRS Axis= 119  deg  T Wave Axis= -59  deg  -  "ABNORMAL ECG -  Sinus tachycardia with irregular rate  Consider anteroseptal infarct  When compared with ECG of 23-Jan-2020 23:27:24,  New or worsened ischemia or infarction  Significant repolarization change  Electronically Signed By:   Date and Time of Study: 2020-01-25 00:10:36          Pulmonary Functions Testing Results:    No results found for: FEV1, FVC, HNY2DDI, TLC, DLCO       Condition on Discharge: Improved some    Vital Signs  /73 (BP Location: Right arm, Patient Position: Lying)   Pulse 105   Temp 97.2 °F (36.2 °C) (Oral)   Resp 16   Ht 180.3 cm (71\")   Wt 74.7 kg (164 lb 10.9 oz)   SpO2 100%   BMI 22.97 kg/m²       Physical Exam:     General Appearance:    Alert, cooperative, in no acute distress   Head:    Normocephalic, without obvious abnormality, atraumatic   Eyes:            Lids and lashes normal, conjunctivae and sclerae normal, no   icterus, no pallor, corneas clear, PERRLA   Ears:    Ears appear intact with no abnormalities noted   Throat:   No oral lesions, no thrush, oral mucosa moist   Neck:   No adenopathy, supple, trachea midline, no thyromegaly, no   carotid bruit, no JVD   Lungs:    Fine bibasilar rales expiratory rhonchi    Heart:   Irregular irregular rhythm and normal rate,    Chest Wall:    No abnormalities observed   Abdomen:     Normal bowel sounds, no masses, no organomegaly, soft        non-tender, non-distended, no guarding, no rebound                tenderness   Extremities:  Referral edema has improved considerably   Pulses:   Pulses palpable and +1 bilaterally   Skin:   No bleeding, bruising or rash very impressive tattoos   Lymph nodes:   No palpable adenopathy   Neurologic:   Cranial nerves 2 - 12 grossly intact, sensation intact, DTR       present and equal bilaterally       Discharge Disposition  Home or Self Care    Discharge Medications     Discharge Medications      New Medications      Instructions Start Date   bumetanide 2 MG tablet  Commonly known as:  " BUMEX   2 mg, Oral, 2 Times Daily      febuxostat 40 MG tablet  Commonly known as:  ULORIC   40 mg, Oral, Daily   Start Date:  January 26, 2020     levothyroxine 50 MCG tablet  Commonly known as:  SYNTHROID, LEVOTHROID   50 mcg, Oral, Daily         Changes to Medications      Instructions Start Date   carvedilol 3.125 MG tablet  Commonly known as:  COREG  What changed:    · medication strength  · how much to take   3.125 mg, Oral, 2 Times Daily         Continue These Medications      Instructions Start Date   amiodarone 200 MG tablet  Commonly known as:  PACERONE   200 mg, Oral, Daily      ASPIR-LOW 81 MG EC tablet  Generic drug:  aspirin   81 mg, Oral, Daily      atorvastatin 10 MG tablet  Commonly known as:  LIPITOR   10 mg, Oral, Daily      budesonide-formoterol 160-4.5 MCG/ACT inhaler  Commonly known as:  SYMBICORT   2 puffs, Inhalation, 2 Times Daily - RT      clopidogrel 75 MG tablet  Commonly known as:  PLAVIX   75 mg, Oral, Daily      FEROSUL 325 (65 FE) MG tablet  Generic drug:  ferrous sulfate   325 mg, Oral, Daily With Breakfast      VENTOLIN  (90 Base) MCG/ACT inhaler  Generic drug:  albuterol sulfate HFA   1 puff, Inhalation, Every 6 Hours PRN      vitamin D 1.25 MG (88979 UT) capsule capsule  Commonly known as:  ERGOCALCIFEROL   50,000 Units, Oral, Every 14 Days         Stop These Medications    furosemide 40 MG tablet  Commonly known as:  LASIX     spironolactone 25 MG tablet  Commonly known as:  ALDACTONE            Discharge Diet:     Activity at Discharge:   Activity Instructions     Activity as Tolerated            Follow-up Appointments  Future Appointments   Date Time Provider Department Center   4/14/2020  6:00 AM [facility]   7/9/2020  3:30 PM [facility]   7/9/2020  3:50 PM Amalia Leal MD [facility]         Test Results Pending at Discharge       Yassine Haines  MD  01/25/20  11:59 AM    Time: Discharge 45 minutes

## 2020-01-25 NOTE — PROGRESS NOTES
"NEPHROLOGY PROGRESS NOTE------[facility]    [facility]  [phone]  Jennifer West MD      Patient Care Team:  Yassine Haines MD as PCP - General  Yassine Haines MD as PCP - Family Medicine  Amalia Leal MD as PCP - Claims Attributed  Amalia Leal MD as Consulting Physician (Cardiology)  Trang West MD as Consulting Physician (Nephrology)      Provider:  Jennifer West MD  Patient Name: Luis E Rahman  :  1940    SUBJECTIVE:    Patient adamant to leave today, although we cautioned against it due to his fluid status and respiratory status.   Negative for CP/ SOA. Lungs with scattered crackles bilaterally and trace edema noted.     Medication:    amiodarone 200 mg Oral Daily   aspirin 81 mg Oral Daily   atorvastatin 10 mg Oral Daily   budesonide-formoterol 2 puff Inhalation BID - RT   bumetanide 1 mg Intravenous Q12H   carvedilol 3.125 mg Oral BID   clopidogrel 75 mg Oral Daily   enoxaparin 30 mg Subcutaneous Q24H   sodium chloride 10 mL Intravenous Q12H          OBJECTIVE    Vital Sign Min/Max for last 24 hours  Temp  Min: 97.2 °F (36.2 °C)  Max: 97.3 °F (36.3 °C)   BP  Min: 101/73  Max: 116/76   Pulse  Min: 72  Max: 109   Resp  Min: 14  Max: 16   SpO2  Min: 93 %  Max: 98 %   No data recorded   Weight  Min: 74.7 kg (164 lb 10.9 oz)  Max: 76.1 kg (167 lb 12.3 oz)     Flowsheet Rows      First Filed Value   Admission Height  180.3 cm (71\") Documented at 20208   Admission Weight  77.1 kg (170 lb) Documented at 2020 2328          No intake/output data recorded.  I/O last 3 completed shifts:  In: 1020 [P.O.:1020]  Out: 1125 [Urine:1125]    Physical Exam:  General Appearance: alert, appears stated age and cooperative  Head: normocephalic, without obvious abnormality and atraumatic  Eyes: conjunctivae and sclerae normal and no icterus  Neck: supple and no JVD  Lungs: clear to auscultation and respirations " regular  Heart: regular rhythm & normal rate and normal S1, S2  Chest: Wall no abnormalities observed  Abdomen: normal bowel sounds and soft non-tender  Extremities: moves extremities well, no edema, no cyanosis and no redness  Skin: no bleeding, bruising or rash, turgor normal, color normal and no lesions noted  Neurologic: Alert, and oriented. No focal deficits    Labs:    WBC WBC   Date Value Ref Range Status   01/25/2020 7.70 3.40 - 10.80 10*3/mm3 Final   01/23/2020 8.60 3.40 - 10.80 10*3/mm3 Final      HGB Hemoglobin   Date Value Ref Range Status   01/25/2020 13.8 13.0 - 17.7 g/dL Final   01/23/2020 15.4 13.0 - 17.7 g/dL Final      HCT Hematocrit   Date Value Ref Range Status   01/25/2020 40.9 37.5 - 51.0 % Final   01/23/2020 46.2 37.5 - 51.0 % Final      Platlets No results found for: LABPLAT   MCV MCV   Date Value Ref Range Status   01/25/2020 106.4 (H) 79.0 - 97.0 fL Final   01/23/2020 104.7 (H) 79.0 - 97.0 fL Final          Sodium Sodium   Date Value Ref Range Status   01/25/2020 138 136 - 145 mmol/L Final   01/23/2020 141 136 - 145 mmol/L Final      Potassium Potassium   Date Value Ref Range Status   01/25/2020 4.5 3.5 - 5.2 mmol/L Final   01/23/2020 4.7 3.5 - 5.2 mmol/L Final      Chloride Chloride   Date Value Ref Range Status   01/25/2020 98 98 - 107 mmol/L Final   01/23/2020 98 98 - 107 mmol/L Final      CO2 CO2   Date Value Ref Range Status   01/25/2020 25.0 22.0 - 29.0 mmol/L Final   01/23/2020 29.0 22.0 - 29.0 mmol/L Final      BUN BUN   Date Value Ref Range Status   01/25/2020 48 (H) 8 - 23 mg/dL Final   01/23/2020 49 (H) 8 - 23 mg/dL Final      Creatinine Creatinine   Date Value Ref Range Status   01/25/2020 2.60 (H) 0.76 - 1.27 mg/dL Final   01/23/2020 2.67 (H) 0.76 - 1.27 mg/dL Final      Calcium Calcium   Date Value Ref Range Status   01/25/2020 9.3 8.6 - 10.5 mg/dL Final   01/23/2020 9.4 8.6 - 10.5 mg/dL Final      PO4 No components found for: PO4   Albumin Albumin   Date Value Ref Range  Status   01/25/2020 3.30 (L) 3.50 - 5.20 g/dL Final      Magnesium Magnesium   Date Value Ref Range Status   01/25/2020 2.6 (H) 1.6 - 2.4 mg/dL Final      Uric Acid No components found for: URIC ACID     Imaging Results (Last 72 Hours)     Procedure Component Value Units Date/Time    US Renal Bilateral [[id number]] Collected:  01/24/20 1552     Updated:  01/24/20 1556    Narrative:       DATE OF EXAM:  1/24/2020 3:10 PM     PROCEDURE:  US RENAL BILATERAL-     INDICATIONS:  ARF/CKD; R07.9-Chest pain, unspecified; R06.00-Dyspnea, unspecified;  I50.9-Heart failure, unspecified; N17.9-Acute kidney failure,  unspecified; N18.9-Chronic kidney disease, unspecified; R79.89-Other  specified abnormal findings of blood chemistry     COMPARISON:  Renal ultrasound 04/02/2017.     TECHNIQUE:   Grayscale and color Doppler ultrasound evaluation of the kidneys and  urinary bladder was performed.     FINDINGS:  The right kidney measures 9.9 x 6.2 x 5.5 cm. Right renal cortical  echogenicity appears within normal limits. 2 right renal cysts measure  2.9 and 1.4 cm each. No hydronephrosis.     The left kidney measures 9.1 x 4.2 x 4.6 cm. Left renal cortical  echogenicity appears within normal limits. No renal mass or  hydronephrosis is seen.     The urinary bladder is distended and unremarkable. Estimated bladder  volume is 201 ml.        Impression:       1.Right renal cysts measuring up to 2.9 cm.  2.Otherwise, unremarkable sonographic appearance of the kidneys.     Electronically Signed By-Kimberly Green On:1/24/2020 3:54 PM  This report was finalized on [id number] by  Kimberly Green, .    XR Chest 1 View [[id number]] Collected:  01/24/20 0715     Updated:  01/24/20 0718    Narrative:       DATE OF EXAM:  1/23/2020 11:21 PM     PROCEDURE:  XR CHEST 1 VW-     INDICATIONS:  chest pain     COMPARISON:  4/28/2019     TECHNIQUE:   Single radiographic view of the chest was obtained.     FINDINGS:  Left subclavian AICD device is again  noted. Sternotomy wires overlie the  midline. There is obscuration of the left base due to cardiomegaly.  Marked cardiomegaly is again present. There is mild pulmonary vascular  congestion. Question small left pleural effusion. Retrocardiac density  suggests a hiatal hernia.       Impression:          1. Cardiomegaly with mild pulmonary vascular congestion.  2. Question small left pleural effusion.     Electronically Signed ByCari Reynolds On:1/24/2020 7:16 AM  This report was finalized on [id number] by  Ethan Reynolds, .          Results for orders placed during the hospital encounter of 01/23/20   XR Chest 1 View    Narrative DATE OF EXAM:  1/23/2020 11:21 PM     PROCEDURE:  XR CHEST 1 VW-     INDICATIONS:  chest pain     COMPARISON:  4/28/2019     TECHNIQUE:   Single radiographic view of the chest was obtained.     FINDINGS:  Left subclavian AICD device is again noted. Sternotomy wires overlie the  midline. There is obscuration of the left base due to cardiomegaly.  Marked cardiomegaly is again present. There is mild pulmonary vascular  congestion. Question small left pleural effusion. Retrocardiac density  suggests a hiatal hernia.       Impression    1. Cardiomegaly with mild pulmonary vascular congestion.  2. Question small left pleural effusion.     Electronically Signed ByCari Reynolds On:1/24/2020 7:16 AM  This report was finalized on [id number] by  Ethan Reynolds, .              ASSESSMENT / PLAN      Chest pain    1. ARF/MASOUD/CRF/CKD STG 3--------Nonoliguric. Creat stable. +ARF/MASOUD on top of known CRF/CKD STG 3, with a baseline serum creatinine of 1.9. In the long run we may have to accept a higher baseline serum creatinine in order to keep euvolemic. +ARF/MASOUD on top of known CRF/CKD STG 3. CRF/CKD STG 3 secondary to HTN NS. +ARF/MASOUD appears to be secondary to decompensated CHF/CP state (cardiorenal) with some relative hypotension also. Avoid hypotension. Cautious diuresis. In the long run we may have to  accept a higher baseline serum creatinine in order to keep euvolemic and patient may have progressed to CRF/CKD STG 4.  No NSAIDs or IV dye. Dose meds for CrCl less than 10 cc/min until ARF/MASOUD is resolved. Lytes, acid-base okay. No uremia. No acute indication for dialysis.     2. 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 Bumex 2mg q 12 hours. Continue to hold acdactone at this time.       3. HTN WITH CKD------BP low. Avoid hypotension. No ACE/ARB/DRI for now     4. ANEMIA OF CKD------Current H/H normal     5. OA/DJD------No NSAIDs. Uloric for hyperuricemia     6. ATRIAL FIBRILLATION----------Rate controlled. Cardiology to see     7. HYPERLIPIDEMIA------On Statin. CK okay. Tx Hypothyroidism     8. VITAMIN D DEFICIENCY------On supplementation     9. COPD/TOBACCO ABUSE------Oxygen, nebs. Counseled on smoking cessation    10. HYPOCALCEMIA------Replace IV. Follow ionized levels.        Jennifer West MD  [facility]  [phone]  01/25/20  7:02 AM

## 2020-01-26 NOTE — OUTREACH NOTE
Prep Survey      Responses   Facility patient discharged from?  [facility]   Is patient eligible?  No   What are the reasons patient is not eligible?  Other [Defient Personality and noncomplaint]   Discharge diagnosis       Does the patient have one of the following disease processes/diagnoses(primary or secondary)?  CHF   Prep survey completed?  Yes          Mora Chance RN

## 2020-02-01 NOTE — PROGRESS NOTES
"Pharmacy Antimicrobial Dosing Service    Subjective:  Luis E Rahman is a 80 y.o.male with MASOUD + elevated troponin + hypoxia + dyspnea. Patient started on empiric abx and pharmacy dosing vancomycin.    PMH: COPD, CAD s/p CABG, CKD, WINDY, HLD, HTN, Afib      Assessment/Plan    1. Day #1 Vancomycin: Pulse dosing d/t renal dysfxn. Pt received 1250 mg IV x1 late last night. Will obtain random level tomorrow with am labs and plan to redose when level is expected to be less than 20 mcg/mL.    2. Day #1 Ceftriaxone: 1g IV q24h.    Will continue to monitor drug levels, renal function, culture and sensitivities, and patient clinical status.       Objective:  Relevant clinical data and objective history reviewed:  180.3 cm (71\")   67.9 kg (149 lb 11.1 oz)   Ideal body weight: 75.3 kg (166 lb 0.1 oz)  Body mass index is 20.88 kg/m².        Results from last 7 days   Lab Units 02/01/20 0227 01/1940 01/28/20  0853   CREATININE mg/dL 4.34* 4.06* 3.28*     Estimated Creatinine Clearance: 12.6 mL/min (A) (by C-G formula based on SCr of 4.34 mg/dL (H)).  I/O last 3 completed shifts:  In: 0   Out: 100 [Urine:100]    Results from last 7 days   Lab Units 01/1940   WBC 10*3/mm3 9.60     Temperature    02/01/20 0235 02/01/20 0550 02/01/20 1121   Temp: 97.3 °F (36.3 °C) 97.4 °F (36.3 °C) 97.4 °F (36.3 °C)     Baseline culture/source/susceptibility:  Microbiology Results (last 10 days)       Procedure Component Value - Date/Time    Blood Culture - Blood, Arm, Right [[id number]] Collected:  01/31/20 1949    Lab Status:  Preliminary result Specimen:  Blood from Arm, Right Updated:  02/01/20 0801     Blood Culture No growth at less than 24 hours    Respiratory Panel, PCR - Swab, Nasopharynx [[id number]]  (Normal) Collected:  01/31/20 1949    Lab Status:  Final result Specimen:  Swab from Nasopharynx Updated:  01/31/20 212     ADENOVIRUS, PCR Not Detected     Coronavirus 229E Not Detected     Coronavirus HKU1 Not Detected     " Coronavirus NL63 Not Detected     Coronavirus OC43 Not Detected     Human Metapneumovirus Not Detected     Human Rhinovirus/Enterovirus Not Detected     Influenza B PCR Not Detected     Parainfluenza Virus 1 Not Detected     Parainfluenza Virus 2 Not Detected     Parainfluenza Virus 3 Not Detected     Parainfluenza Virus 4 Not Detected     Bordetella pertussis pcr Not Detected     Influenza A H1 2009 PCR Not Detected     Chlamydophila pneumoniae PCR Not Detected     Mycoplasma pneumo by PCR Not Detected     Influenza A PCR Not Detected     Influenza A H3 Not Detected     Influenza A H1 Not Detected     RSV, PCR Not Detected    Blood Culture - Blood, Arm, Left [[id number]] Collected:  01/1940    Lab Status:  Preliminary result Specimen:  Blood from Arm, Left Updated:  02/01/20 0745     Blood Culture No growth at less than 24 hours    Eosinophil Smear - Urine, Urine, Clean Catch [[id number]]  (Normal) Collected:  01/24/20 1740    Lab Status:  Final result Specimen:  Urine, Clean Catch Updated:  01/24/20 1941     Eosinophil Smear 0 % EOS/100 Cells     Blood Culture - Blood, Arm, Left [[id number]] Collected:  01/24/20 0022    Lab Status:  Final result Specimen:  Blood from Arm, Left Updated:  01/29/20 0030     Blood Culture No growth at 5 days    Blood Culture - Blood, Arm, Left [[id number]] Collected:  01/23/20 2349    Lab Status:  Final result Specimen:  Blood from Arm, Left Updated:  01/29/20 0000     Blood Culture No growth at 5 days             Anti-Infectives (From admission, onward)      Ordered     Dose/Rate Route Frequency Start Stop    02/01/20 1124  cefTRIAXone (ROCEPHIN) 1 g in sodium chloride 0.9 % 100 mL IVPB     Ordering Provider:  Yassine Haines MD    1 g  200 mL/hr over 30 Minutes Intravenous Every 24 Hours 02/01/20 2000 02/06/20 1959 02/01/20 1124  Pharmacy to dose vancomycin     Ordering Provider:  Yassine Haines MD     Does not apply Continuous PRN 02/01/20 1124 02/06/20 1123    01/31/20 2021   vancomycin 1250 mg/250 mL 0.9% NS IVPB ([facility])     Ordering Provider:  Kaila Ojeda PA    1,250 mg Intravenous Once 01/31/20 2045 01/31/20 2257 01/31/20 2021  ceFEPime (MAXIPIME) in SWFI 2g/10ml IV PUSH syringe     Ordering Provider:  Kaila Ojeda PA    2 g  over 5 Minutes Intravenous Once 01/31/20 2022 01/31/20 2048          Yuliana Olmos PharmD, BCPS  02/01/20 11:25 AM

## 2020-02-01 NOTE — CONSULTS
Cardiology Consult Note      Requesting Physician    Yassine Haines MD    PATIENT IDENTIFICATION    Name: Luis E Rahman Age: 80 y.o. Sex: male :  1940 MRN: [id number]       Cardiology assessment and plan    Severe ischemic cardiomyopathy  Severe LV dysfunction  Congestive heart failure NYHA class IV  Cardiogenic shock  Respiratory failure secondary to pulmonary edema  Atrial flutter with rapid ventricle response  Elevated troponin likely demand ischemia  Prior known coronary artery disease with prior coronary artery bypass surgery and prior PCI and stenting  Hypotension  Lactic acidosis  Worsening renal failure  Prior history of ICD shocks for ventricular tachycardia   -status post CABG 2000.    Status post stent to SVG to RCA 10/17/2018   cardiac catheterization 10/17/2018.  LV-gram not performed due to renal dysfunction.  Total LAD and RCA. .  Marginal branch has 30% disease.  Villarreal is atretic.  SVG to marginal branch is patent.  SVG to diagonal branch is patent and is filling LAD also.  SVG to PDA had 95% disease just distal to the landing site and had intervention.  History of peripheral vascular disease status post peripheral intervention  History of hypertension hyperlipidemia    Discussed with the patient and family at bedside  Overall prognosis is poor  Likely will benefit from a cardiac catheterization at some point but with ongoing lactic acidosis and her cardiogenic shock I am not sure if it is going to make a meaningful difference at this time  Recent echocardiogram with severe LV dysfunction and mitral regurgitation  Plan to admit the patient to ICU for close observation  Start patient on amiodarone for better rate control and hopefully switching back into sinus rhythm  Start patient on IV dobutamine to help with cardiac output  Continue anticoagulation therapy if there is no contraindication  Consider starting central venous line plus or minus a Tolleson-Shiloh catheter for better  idea about the volume status  Further recommendations based on patient hospital course  Risk benefits and alternatives discussed with the patient and family            REASON FOR CONSULTATION:  - ischemic cardiomyopathy.       - status post single-chamber ICD 03/05/2010.  Have repositioning of the ventricular lead 07/09/2010.  Status post ICD shocks for ventricular tachycardia.  Patient is doing better with amiodarone and diuresis.  VT zone was reduced to 142 beats per minute     Status post ICD pulse generator replacement 9/6/2019.  Revision of pocket and repositioning of the pulse generator more medially and superiorly.        - status post successful ICD shock for ventricular tachycardia 09/24/2018      -history of ICD shock with successful defibrillation.  04/07/2017. Status post ICD shock  x2 for sustained ventricular tachycardia 02/05/2018.  Unsuccessful at 25 joules followed by successful 35 joules shock.Patient was asymptomatic.     Echocardiogram showed left ventricular dysfunction with ejection fraction of 40%.  Left atrial enlargement is present.  10/10/2018.  Michelle scan Cardiolite test is abnormal with inferior infarction and significant poster lateral ischemia.      -status post CABG 01/2000.    Status post stent to SVG to RCA 10/17/2018   cardiac catheterization 10/17/2018.  LV-gram not performed due to renal dysfunction.  Total LAD and RCA. .  Marginal branch has 30% disease.  Villarreal is atretic.  SVG to marginal branch is patent.  SVG to diagonal branch is patent and is filling LAD also.  SVG to PDA had 95% disease just distal to the landing site and had intervention.      - dizziness  Probably due to low blood pressure.  -improved with reduce dose of lisinopril.     - compensated congestive heart failure     - history of atrial fibrillation.  Patient has converted and was maintaining sinus rhythm.  Patient is in atrial fibrillation     -status post left iliofemoral thrombectomy and endarterectomy  04/01/2017.  Status post stent to right common iliac artery and left external iliac artery 04/04/2017 P     -dyslipidemia hypertension and history of smoking.  Renal dysfunctionBUN 17 creatinine 1.9     -status post hemorrhoidectomy and hernia repair     -status post impending inferior myocardial infarction prior to RCA stent placement 1997.              CC:  Cough, shortness of breath  A. fib RVR  Elevated cardiac enzymes  Congestive heart failure    HISTORY OF PRESENT ILLNESS:   Patient presented to the emergency department with report of cough and shortness of breath.  He states his symptoms began approximately 3 weeks ago and have slowly progressed in severity.  He denies any actual chest pains, pressure or tightness.  He also reported lower extremity edema-he has been increasing his diuretics.  He does continue to smoke.  He has been using SVN more frequently.  Patient was recently admitted to the hospital 1/24-1/25 with shortness of breath as well.  In the emergency department on this admission BNP was elevated at 66,299.  Troponin was elevated as well.  He has been given IV diuretics and was on Bumex drip as well.  Upon my evaluation, patient is lying in bed and appears comfortable.  He denies any complaints at the present time.  He reports the chest discomfort prior to admission to be a tightness with no further discomfort.  Labs include troponin 0 0.253, 0.265, 0.286.  proBNP 66,299.  EKG shows A. fib with RVR.  Chest x-ray shows abnormal medial left basilar consolidation suspicious for either chronic scarring or atelectasis.  No pulmonary edema reported.  2D echo 1/20/2020 shows EF 20%.    REVIEW OF SYSTEMS:  Pertinent items are noted in HPI, all other systems reviewed and negative  Positive for shortness of breath fatigue weakness abdominal pain nausea vomiting shortness of breath  Denies any hematemesis melena no musculoskeletal symptoms no  symptoms  OBJECTIVE       ASSESSMENT/PLAN    Chest  "pain  Atrial fibrillation  Abnormal cardiac enzymes  Elevated BNP  Severe ischemic cardiomyopathy  ICD in situ  Status post CABG    Plan  Patient remains in A. fib, suboptimally controlled at 118.  He is having no further chest discomfort.  Troponin elevation at 0.2×3 total- possibly due to: acute on chronic systolic heart failure versus A. fib RVR versus worsening of underlying coronary disease.  Patient is currently on heparin drip-low-dose due to elevated troponin  He is receiving gentle hydration  Nephrology is following as well  Further recommendations per cardiologist          Vital Signs  Visit Vitals  BP 95/71 (Patient Position: Lying)   Pulse 118   Temp 97.4 °F (36.3 °C) (Oral)   Resp 16   Ht 180.3 cm (71\")   Wt 67.9 kg (149 lb 11.1 oz)   SpO2 95%   BMI 20.88 kg/m²     Oxygen Therapy  SpO2: 95 %  Pulse Oximetry Type: Continuous  Device (Oxygen Therapy): nasal cannula  Flow (L/min): 4  Flowsheet Rows      First Filed Value   Admission Height  180.3 cm (71\") Documented at 01/31/2020 1921   Admission Weight  73.5 kg (162 lb) Documented at 01/31/2020 1921        Intake & Output (last 3 days)       01/29 0701 - 01/30 0700 01/30 0701 - 01/31 0700 01/31 0701 - 02/01 0700 02/01 0701 - 02/02 0700    P.O.   0     Total Intake(mL/kg)   0 (0)     Urine (mL/kg/hr)   100     Total Output   100     Net   -100                 Lines, Drains & Airways    Active LDAs     Name:   Placement date:   Placement time:   Site:   Days:    Peripheral IV 01/31/20 1929 Left Antecubital   01/31/20 1929    Antecubital   less than 1    Peripheral IV 01/31/20 2055 Right Antecubital   01/31/20 2055    Antecubital   less than 1                Medical History    Past Medical History:   Diagnosis Date   • Atrial fibrillation (CMS/HCC)    • CHF (congestive heart failure) (CMS/HCC)    • Hyperlipidemia    • Hypertension    • Myocardial infarction (CMS/HCC)         Surgical History    Past Surgical History:   Procedure Laterality Date   • " "CARDIAC CATHETERIZATION     • CARDIAC ELECTROPHYSIOLOGY PROCEDURE N/A 9/6/2019    Procedure: ICD DC generator change;  Surgeon: Amalia Leal MD;  Location: [facility] CATH INVASIVE LOCATION;  Service: Cardiovascular   • CARDIAC ELECTROPHYSIOLOGY PROCEDURE N/A 9/6/2019    Procedure: Pocket Revision;  Surgeon: Amalia Leal MD;  Location: [facility] CATH INVASIVE LOCATION;  Service: Cardiovascular   • CORONARY ANGIOPLASTY WITH STENT PLACEMENT     • CORONARY ARTERY BYPASS GRAFT     • PACEMAKER REPLACEMENT          Family History    No family history on file.    Social History    Social History     Tobacco Use   • Smoking status: Current Every Day Smoker     Packs/day: 0.50     Types: Cigarettes   • Smokeless tobacco: Never Used   Substance Use Topics   • Alcohol use: No     Frequency: Never        Allergies    No Known Allergies           BP 95/71 (Patient Position: Lying)   Pulse 118   Temp 97.4 °F (36.3 °C) (Oral)   Resp 16   Ht 180.3 cm (71\")   Wt 67.9 kg (149 lb 11.1 oz)   SpO2 95%   BMI 20.88 kg/m²   Intake/Output last 3 shifts:  I/O last 3 completed shifts:  In: 0   Out: 100 [Urine:100]  Intake/Output this shift:  No intake/output data recorded.    PHYSICAL EXAM:    General: Frail and ill-looking, disheveled appearing 80-year-old  male who is alert, cooperative, no distress, appears stated age  Head:  Normocephalic, atraumatic, mucous membranes moist  Eyes:  Conjunctiva/corneas clear, EOM's intact     Neck:  Supple,  no adenopathy;      Lungs: Decreased breath sounds bilateral bases with few crackles  Chest wall: No tenderness  Heart::  Irregularly irregular rate and rhythm, S1 and S2 normal, soft systolic murmur/placed LV apical impulse with loud P2   abdomen: Soft, non-tender, nondistended bowel sounds active  Extremities: No cyanosis, clubbing, or edema groin soft no hematoma.  Pulses: 2+ and symmetric all extremities  Skin:  No rashes or lesions  Neuro/psych: A&O x3. CN II through XII are " grossly intact with appropriate affect      Scheduled Meds:        Acetylcysteine 1,200 mg Oral Q12H   midodrine 5 mg Oral TID AC       Continuous Infusions:      bumetanide 1 mg/hr Last Rate: 1 mg/hr (02/01/20 0758)   heparin 12 Units/kg/hr Last Rate: 10 Units/kg/hr (02/01/20 0727)       PRN Meds:    heparin  •  heparin  •  sodium chloride  •  sodium chloride        Results Review:     I reviewed the patient's new clinical results.    CBC    Results from last 7 days   Lab Units 01/1940   WBC 10*3/mm3 9.60   HEMOGLOBIN g/dL 16.1   PLATELETS 10*3/mm3 111*     Cr Clearance Estimated Creatinine Clearance: 12.6 mL/min (A) (by C-G formula based on SCr of 4.34 mg/dL (H)).  Coag   Results from last 7 days   Lab Units 02/01/20 0713 02/01/20 0227 01/1940   INR  1.50*  --  1.44*   APTT seconds 76.7* 92.5* 23.3*     HbA1C No results found for: HGBA1C  Blood Glucose No results found for: POCGLU  Infection Results from last 7 days   Lab Units 01/31/20 1949 01/1940   BLOODCX  No growth at less than 24 hours No growth at less than 24 hours     CMP Results from last 7 days   Lab Units 02/01/20 0227 01/1940 01/28/20  0853   SODIUM mmol/L 133* 133* 136   POTASSIUM mmol/L 5.3* 5.0 4.5   CHLORIDE mmol/L 92* 91* 89*   CO2 mmol/L 25.0 23.0 26.5   BUN mg/dL 83* 77* 58*   CREATININE mg/dL 4.34* 4.06* 3.28*   GLUCOSE mg/dL 114* 118* 188*   ALBUMIN g/dL  --  4.00  --    BILIRUBIN mg/dL  --  1.8*  --    ALK PHOS U/L  --  133*  --    AST (SGOT) U/L  --  51*  --    ALT (SGPT) U/L  --  49*  --      ABG  Results from last 7 days   Lab Units 01/31/20 2004   PH, ARTERIAL pH units 7.453*   PCO2, ARTERIAL mm Hg 32.0*   PO2 ART mm Hg 116.1*   O2 SATURATION ART % 98.8*   BASE EXCESS ART mmol/L -0.7*     UA      JOSEFINA  No results found for: POCMETH, POCAMPHET, POCBARBITUR, POCBENZO, POCCOCAINE, POCOPIATES, POCOXYCODO, POCPHENCYC, POCPROPOXY, POCTHC, POCTRICYC  Lysis Labs Results from last 7 days   Lab Units 02/01/20  0773  02/01/20 0227 01/1940 01/28/20  0853   INR  1.50*  --  1.44*  --    APTT seconds 76.7* 92.5* 23.3*  --    HEMOGLOBIN g/dL  --   --  16.1  --    PLATELETS 10*3/mm3  --   --  111*  --    CREATININE mg/dL  --  4.34* 4.06* 3.28*     Radiology(recent) Xr Chest 1 View    Result Date: 1/31/2020   1. Cardiomegaly. 2. Abnormal medial left basilar consolidation suspicious for either chronic scarring or atelectasis. There is also a hiatal hernia.  Electronically Signed By-Adrian Morris On:1/31/2020 8:27 PM This report was finalized on [id number] by  Adrian Morris, .        Results from last 7 days   Lab Units 02/01/20 0227   TROPONIN T ng/mL 0.286*       Xrays, labs reviewed personally by physician.    ECG/EMG Results (most recent)     Procedure Component Value Units Date/Time    ECG 12 Lead [[id number]]  (Abnormal) Resulted:  01/31/20 2130     Updated:  01/31/20 2130    ECG 12 Lead [[id number]] Collected:  02/01/20 0116     Updated:  02/01/20 0117    Narrative:       HEART RATE= 111  bpm  RR Interval= 557  ms  GA Interval=   ms  P Horizontal Axis=   deg  P Front Axis=   deg  QRSD Interval= 171  ms  QT Interval= 408  ms  QRS Axis= 127  deg  T Wave Axis= -52  deg  - ABNORMAL ECG -  Atrial flutter with predominant 2:1 AV block  Nonspecific intraventricular conduction delay  Probable anterolateral infarct, old  ST depr, consider ischemia, inferior leads  Electronically Signed By:   Date and Time of Study: 2020-02-01 01:16:37    ECG 12 Lead [[id number]] Collected:  01/1940     Updated:  02/01/20 0733    Narrative:       HEART RATE= 115  bpm  RR Interval= 522  ms  GA Interval=   ms  P Horizontal Axis=   deg  P Front Axis=   deg  QRSD Interval= 144  ms  QT Interval= 387  ms  QRS Axis= 113  deg  T Wave Axis= -62  deg  - ABNORMAL ECG -  Atrial fibrillation  Borderline repol abnormality, diffuse leads  When compared with ECG of 25-Jan-2020 0:10:36,  Significant change in rhythm  Electronically Signed By: Satinder Campbell (Geoff)  01-Feb-2020 07:33:07  Date and Time of Study: 2020-01-31 19:38:18            Medication Review:   I have reviewed the patient's current medication list  Scheduled Meds:  Acetylcysteine 1,200 mg Oral Q12H   midodrine 5 mg Oral TID AC     Continuous Infusions:  bumetanide 1 mg/hr Last Rate: 1 mg/hr (02/01/20 0758)   heparin 12 Units/kg/hr Last Rate: 10 Units/kg/hr (02/01/20 0727)     PRN Meds:.heparin  •  heparin  •  sodium chloride  •  sodium chloride    Imaging:  Imaging Results (Last 72 Hours)     Procedure Component Value Units Date/Time    XR Chest 1 View [[id number]] Collected:  01/31/20 2014     Updated:  01/31/20 2030    Narrative:       DATE OF EXAM:  1/31/2020 8:08 PM     PROCEDURE:  XR CHEST 1 VW-     INDICATIONS:  Chest pain, tobacco abuse, shortness of breath, heart disease.      COMPARISON:  01/23/2020.     TECHNIQUE:   Single radiographic view of the chest was obtained.     FINDINGS:  The heart is enlarged. There are postsurgical changes apparent which  includes a left-sided transvenous pacemaker/intracardiac defibrillator.  There is abnormal medial left basilar consolidation suspicious for  chronic scarring or atelectasis. The patient has a large hiatal hernia.  The right lung is clear. There are no pleural effusions.  There are  chronic age-related changes involving the bony thorax and thoracic  aorta.       Impression:          1. Cardiomegaly.  2. Abnormal medial left basilar consolidation suspicious for either  chronic scarring or atelectasis. There is also a hiatal hernia.     Electronically Signed By-Adrian Morris On:1/31/2020 8:27 PM  This report was finalized on [id number] by  Adrian Morris, .            JAME Johnson  02/01/20  9:08 AM      Electronically signed by JAME Johnson, 02/01/20, 11:09 AM.

## 2020-02-01 NOTE — CONSULTS
NEPHROLOGY CONSULTATION-----[facility]    [facility]  [phone]  Tony Cordero MD    Patient Care Team:  Yassine Haines MD as PCP - General  Yassine Haines MD as PCP - Family Medicine  Amalia Leal MD as PCP - Claims Attributed  Amalia Leal MD as Consulting Physician (Cardiology)  Trang West MD as Consulting Physician (Nephrology)    CC/REASON FOR CONSULTATION: Elevated creatinine    History of Present Illness     80 year old male with PMHx CKD4, HTN, CAD presented with chest pain, SOA, and cough. He has been feeling week and almost passed out. His BP was low. His cr up at 4. Recent creatinine in the low 2s. Says bumex was increased recently to 4 mg BID. He was seen in office yesterday, midodrine was added for low BP. No vomiting or diarrhea, No dysuria, gross hematuria. Recent UA negative. His BNP was > 60k, troponin was elevated as well.    Review of Systems   As noted above, otherwise 10 systems reviewed and were negative.    Past Medical History:   Diagnosis Date   • Atrial fibrillation (CMS/HCC)    • CHF (congestive heart failure) (CMS/HCC)    • Hyperlipidemia    • Hypertension    • Myocardial infarction (CMS/HCC)        Past Surgical History:   Procedure Laterality Date   • CARDIAC CATHETERIZATION     • CARDIAC ELECTROPHYSIOLOGY PROCEDURE N/A 9/6/2019    Procedure: ICD DC generator change;  Surgeon: Amalia Leal MD;  Location: [facility] CATH INVASIVE LOCATION;  Service: Cardiovascular   • CARDIAC ELECTROPHYSIOLOGY PROCEDURE N/A 9/6/2019    Procedure: Pocket Revision;  Surgeon: Amalia Leal MD;  Location: [facility] CATH INVASIVE LOCATION;  Service: Cardiovascular   • CORONARY ANGIOPLASTY WITH STENT PLACEMENT     • CORONARY ARTERY BYPASS GRAFT     • PACEMAKER REPLACEMENT         No family history on file.    Social History     Tobacco Use   • Smoking status: Current Every Day Smoker     Packs/day: 0.50     Types: Cigarettes   •  Smokeless tobacco: Never Used   Substance Use Topics   • Alcohol use: No     Frequency: Never   • Drug use: Not on file       Home Meds:   Medications Prior to Admission   Medication Sig Dispense Refill Last Dose   • albuterol sulfate HFA (VENTOLIN HFA) 108 (90 Base) MCG/ACT inhaler Inhale 1 puff Every 6 (Six) Hours As Needed for Shortness of Air.   Not Taking   • amiodarone (PACERONE) 200 MG tablet Take 200 mg by mouth Daily.      • aspirin (ASPIR-LOW) 81 MG EC tablet Take 81 mg by mouth Daily.   Taking   • atorvastatin (LIPITOR) 10 MG tablet Take 10 mg by mouth Daily.      • budesonide-formoterol (SYMBICORT) 160-4.5 MCG/ACT inhaler Inhale 2 puffs 2 (Two) Times a Day.      • bumetanide (BUMEX) 2 MG tablet Take 1 tablet by mouth 2 (Two) Times a Day for 30 days. 60 tablet 0    • carvedilol (COREG) 3.125 MG tablet Take 1 tablet by mouth 2 (Two) Times a Day for 30 days. 60 tablet 3    • clopidogrel (PLAVIX) 75 MG tablet Take 75 mg by mouth Daily.      • febuxostat (ULORIC) 40 MG tablet Take 1 tablet by mouth Daily for 30 days. 30 tablet 5    • FEROSUL 325 (65 Fe) MG tablet Take 325 mg by mouth Daily With Breakfast.   Taking   • levothyroxine (SYNTHROID, LEVOTHROID) 50 MCG tablet Take 1 tablet by mouth Daily for 30 days. 30 tablet 3    • vitamin D (ERGOCALCIFEROL) 01398 units capsule capsule Take 50,000 Units by mouth Every 14 (Fourteen) Days.   Taking       Scheduled Meds:    Acetylcysteine 1,200 mg Oral Q12H   cefTRIAXone 1 g Intravenous Q24H   midodrine 5 mg Oral TID AC       Continuous Infusions:    heparin 12 Units/kg/hr Last Rate: 10 Units/kg/hr (02/01/20 1103)   Pharmacy to dose vancomycin     sodium chloride 125 mL/hr Last Rate: 125 mL/hr (02/01/20 1152)       PRN Meds:  heparin  •  heparin  •  ondansetron  •  Pharmacy to dose vancomycin  •  sodium chloride  •  sodium chloride    Allergies:  Patient has no known allergies.    OBJECTIVE    Vital Signs  Temp:  [97.3 °F (36.3 °C)-98.6 °F (37 °C)] 97.4 °F (36.3  °C)  Heart Rate:  [104-120] 118  Resp:  [16-20] 16  BP: ()/(55-84) 97/55    No intake/output data recorded.  I/O last 3 completed shifts:  In: 0   Out: 100 [Urine:100]    Physical Exam:  General Appearance: alert, appears stated age and cooperative  Head: normocephalic, without obvious abnormality and atraumatic  Eyes: conjunctivae and sclerae normal and no icterus  Neck: supple and no JVD  Lungs: clear to auscultation and respirations regular  Heart: regular rhythm & normal rate and normal S1, S2  Chest Wall: no abnormalities observed  Abdomen: normal bowel sounds and soft non-tender  Extremities: moves extremities well, trace edema, no cyanosis and no redness  Skin: no bleeding, bruising or rash  Neurologic: Alert, and oriented. No focal deficits    Results Review:    I reviewed the patient's new clinical results.    WBC WBC   Date Value Ref Range Status   01/31/2020 9.60 3.40 - 10.80 10*3/mm3 Final      HGB Hemoglobin   Date Value Ref Range Status   01/31/2020 16.1 13.0 - 17.7 g/dL Final      HCT Hematocrit   Date Value Ref Range Status   01/31/2020 48.9 37.5 - 51.0 % Final      Platlets No results found for: LABPLAT   MCV MCV   Date Value Ref Range Status   01/31/2020 107.1 (H) 79.0 - 97.0 fL Final          Sodium Sodium   Date Value Ref Range Status   02/01/2020 133 (L) 136 - 145 mmol/L Final   01/31/2020 133 (L) 136 - 145 mmol/L Final      Potassium Potassium   Date Value Ref Range Status   02/01/2020 5.3 (H) 3.5 - 5.2 mmol/L Final   01/31/2020 5.0 3.5 - 5.2 mmol/L Final      Chloride Chloride   Date Value Ref Range Status   02/01/2020 92 (L) 98 - 107 mmol/L Final   01/31/2020 91 (L) 98 - 107 mmol/L Final      CO2 CO2   Date Value Ref Range Status   02/01/2020 25.0 22.0 - 29.0 mmol/L Final   01/31/2020 23.0 22.0 - 29.0 mmol/L Final      BUN BUN   Date Value Ref Range Status   02/01/2020 83 (H) 8 - 23 mg/dL Final   01/31/2020 77 (H) 8 - 23 mg/dL Final      Creatinine Creatinine   Date Value Ref Range  Status   02/01/2020 4.34 (H) 0.76 - 1.27 mg/dL Final   01/31/2020 4.06 (H) 0.76 - 1.27 mg/dL Final      Calcium Calcium   Date Value Ref Range Status   02/01/2020 10.1 8.6 - 10.5 mg/dL Final   01/31/2020 10.7 (H) 8.6 - 10.5 mg/dL Final      PO4 No results found for: CAPO4   Albumin Albumin   Date Value Ref Range Status   01/31/2020 4.00 3.50 - 5.20 g/dL Final      Magnesium No results found for: MG   Uric Acid No results found for: URICACID       Imaging Results (Last 72 Hours)     Procedure Component Value Units Date/Time    XR Chest 1 View [[id number]] Collected:  01/31/20 2014     Updated:  01/31/20 2030    Narrative:       DATE OF EXAM:  1/31/2020 8:08 PM     PROCEDURE:  XR CHEST 1 VW-     INDICATIONS:  Chest pain, tobacco abuse, shortness of breath, heart disease.      COMPARISON:  01/23/2020.     TECHNIQUE:   Single radiographic view of the chest was obtained.     FINDINGS:  The heart is enlarged. There are postsurgical changes apparent which  includes a left-sided transvenous pacemaker/intracardiac defibrillator.  There is abnormal medial left basilar consolidation suspicious for  chronic scarring or atelectasis. The patient has a large hiatal hernia.  The right lung is clear. There are no pleural effusions.  There are  chronic age-related changes involving the bony thorax and thoracic  aorta.       Impression:          1. Cardiomegaly.  2. Abnormal medial left basilar consolidation suspicious for either  chronic scarring or atelectasis. There is also a hiatal hernia.     Electronically Signed By-Adrian Morris On:1/31/2020 8:27 PM  This report was finalized on [id number] by  Adrian Morris, .            Results for orders placed during the hospital encounter of 01/31/20   XR Chest 1 View    Narrative DATE OF EXAM:  1/31/2020 8:08 PM     PROCEDURE:  XR CHEST 1 VW-     INDICATIONS:  Chest pain, tobacco abuse, shortness of breath, heart disease.      COMPARISON:  01/23/2020.     TECHNIQUE:   Single radiographic view  of the chest was obtained.     FINDINGS:  The heart is enlarged. There are postsurgical changes apparent which  includes a left-sided transvenous pacemaker/intracardiac defibrillator.  There is abnormal medial left basilar consolidation suspicious for  chronic scarring or atelectasis. The patient has a large hiatal hernia.  The right lung is clear. There are no pleural effusions.  There are  chronic age-related changes involving the bony thorax and thoracic  aorta.       Impression    1. Cardiomegaly.  2. Abnormal medial left basilar consolidation suspicious for either  chronic scarring or atelectasis. There is also a hiatal hernia.     Electronically Signed By-Adrian Morris On:1/31/2020 8:27 PM  This report was finalized on [id number] by  Adrian Morris, .              ASSESSMENT / PLAN      Chest pain    · MASOUD  · CKD4  · Hyperkalemia--low K diet, No ACEi/ARBs  · HTN--BP low. Continue midodrine  · Hx CAD/ angina--per cards. Would hold off cath until renal function improves  · Ischemic cardiomyopathy    Gently hydrate  Hold diuretics  Will follow.      I discussed the patients findings and my recommendations with patient, family and primary care team    Will follow along closely. Thank you for allowing us to see this patient in renal consultation.    [facility]  [phone]  MD Tony Main MD  02/01/20  12:15 PM

## 2020-02-01 NOTE — PROCEDURES
Central Venous Catheter Placement  Date: 1/20/2020  Time: 5:30 PM    Indication: Hemodynamic monitoring/Intravenous access    Attending: Dr. Ariza  A time-out was completed verifying correct patient, procedure, site, positioning. The patient was placed in a dependent position appropriate for central line placement based on the vein to be cannulated. The patient’s right IJ pain was identified via ultrasound and the right neck was prepped and draped in sterile fashion. 1% Lidocaine was used to anesthetize the surrounding skin area and subcutaneous tissue.  An 18-gauge needle was then introduced to the vein under ultrasound guidance at which time dark red, nonpulsatile blood was visualized.  A guidewire was then passed smoothly through the needle and the needle removed.  The vein skin and subcutaneous tissue and dilated with use of the supplied dilator.  A triple lumen 9-Green[place]ic catheter was introduced into the the right IJ vein using the Seldinger technique. The catheter was threaded smoothly over the guide wire and then the wire was removed and visualized to be intact.  Immediate return of dark red, nonpulsatile blood was obtained from each port which were then flushed with 10 mL of sterile saline. The catheter was then sutured in place to the skin and a sterile dressing applied.     Estimated Blood Loss: 5 mL  The patient tolerated the procedure well and there were no complications.     Chest x-ray pending to verify placement and assess for pneumothorax    Attending physician statement:  Above note scribed by nurse practitioner for me and later reviewed for accuracy. I've examined the patient and reviewed all labs and images.   I have directly participated in the evaluation and management of this patient.  Devon Ariza MD  Pulmonary and [facility]  [facility]

## 2020-02-01 NOTE — ED PROVIDER NOTES
"Subjective   Patient is an 80-year-old  male with history of hypertension, A. fib, CHF presents to the ER per EMS with complaint of chest pain, cough and shortness of breath.  Patient is a poor historian, unable to tell me how long he has been feeling bad.  Patient states that he has had chest pain \"for couple months\".  Patient scribes the chest pain as a pressure in his chest that is intermittent, does not radiate and rates it a 7/10.  Patient reports history of previous stent placement, currently takes Plavix.  Patient states the cough, congestion started within the last few days.  Patient also complains of some abdominal pain, nausea and vomiting for the past few days, reports 5 episodes of vomiting today.  Patient also reports some dizziness.  Patient states that he was recently admitted to the hospital, last week, discharged on 1/25/2020.  Patient is a current smoker.      History provided by:  Patient      Review of Systems   Constitutional: Positive for fatigue. Negative for fever.   HENT: Negative for congestion, rhinorrhea, sinus pressure, sinus pain, sore throat and trouble swallowing.    Respiratory: Positive for cough and shortness of breath. Negative for wheezing.    Cardiovascular: Positive for chest pain. Negative for palpitations and leg swelling.   Gastrointestinal: Positive for abdominal pain, nausea and vomiting. Negative for diarrhea.   Genitourinary: Negative for dysuria.   Musculoskeletal: Negative for myalgias.   Skin: Positive for color change. Negative for rash.        Bruising   Neurological: Positive for dizziness and weakness. Negative for headaches.   Psychiatric/Behavioral: Negative for behavioral problems.   All other systems reviewed and are negative.      Past Medical History:   Diagnosis Date   • Atrial fibrillation (CMS/HCC)    • CHF (congestive heart failure) (CMS/HCC)    • Hyperlipidemia    • Hypertension    • Myocardial infarction (CMS/HCC)        No Known " Allergies    Past Surgical History:   Procedure Laterality Date   • CARDIAC CATHETERIZATION     • CARDIAC ELECTROPHYSIOLOGY PROCEDURE N/A 9/6/2019    Procedure: ICD DC generator change;  Surgeon: Amalia Leal MD;  Location: [facility] CATH INVASIVE LOCATION;  Service: Cardiovascular   • CARDIAC ELECTROPHYSIOLOGY PROCEDURE N/A 9/6/2019    Procedure: Pocket Revision;  Surgeon: Amalia Leal MD;  Location: [facility] CATH INVASIVE LOCATION;  Service: Cardiovascular   • CORONARY ANGIOPLASTY WITH STENT PLACEMENT     • CORONARY ARTERY BYPASS GRAFT     • PACEMAKER REPLACEMENT         No family history on file.    Social History     Socioeconomic History   • Marital status: Single     Spouse name: Not on file   • Number of children: Not on file   • Years of education: Not on file   • Highest education level: Not on file   Tobacco Use   • Smoking status: Current Every Day Smoker     Packs/day: 0.50     Types: Cigarettes   • Smokeless tobacco: Never Used   Substance and Sexual Activity   • Alcohol use: No     Frequency: Never           Objective   Physical Exam   Constitutional: He is oriented to person, place, and time. He appears well-developed and well-nourished. He appears ill.   Patient appears chronically ill   HENT:   Head: Normocephalic and atraumatic.   Neck: Normal range of motion.   Cardiovascular: An irregularly irregular rhythm present.   No murmur heard.  Pulmonary/Chest: Effort normal. No stridor. He has decreased breath sounds. He has wheezes. He has no rhonchi.   Abdominal: Soft.   Musculoskeletal:        Right lower leg: He exhibits edema. He exhibits no tenderness.        Left lower leg: He exhibits edema. He exhibits no tenderness.   Lymphadenopathy:     He has no cervical adenopathy.   Neurological: He is alert and oriented to person, place, and time. He is not disoriented.   Skin: Skin is warm. Ecchymosis noted. No rash noted. He is not diaphoretic. No erythema.   Multiple ecchymosis in different stages  "of healing, no active bleeding   Psychiatric: He has a normal mood and affect. His behavior is normal.   Nursing note and vitals reviewed.      ECG 12 Lead    Date/Time: 1/31/2020 7:56 PM  Performed by: Kaila Ojeda PA  Authorized by: Kaila Ojeda PA   Interpreted by physician (Satinder Campbell)  Comparison: compared with previous ECG from 1/25/2020  Comparison to previous ECG: A fib, 108  Rhythm: atrial fibrillation  Rate: normal  BPM: 115  Conduction: conduction normal  ST Segments: ST segments normal  T Waves: T waves normal  Clinical impression: abnormal ECG                 ED Course  ED Course as of Jan 31 2133 Fri Jan 31, 2020 2026 Spoke with Dr. Satinder Campbell regarding patient HPI and labwork. Placed consult for cardiology, Dr. Leal    [MM]   2041 I and Dr. Satinder Campbell spoke with Dr. Shirley who agreed with plan of care of starting heparin here in the ER.  He did not feel like patient needed to go to the Cath Lab at this current time.    [MM]   2049 Consult placed to hospitalist, Spoke with Dr. BERTO Almonte who agreed to accept patient for admission    [MM]      ED Course User Index  [MM] Kaila Ojeda PA    /84   Pulse 115   Temp 98.6 °F (37 °C) (Oral)   Resp 20   Ht 180.3 cm (71\")   Wt 73.5 kg (162 lb)   SpO2 94%   BMI 22.59 kg/m²   Labs Reviewed   COMPREHENSIVE METABOLIC PANEL - Abnormal; Notable for the following components:       Result Value    Glucose 118 (*)     BUN 77 (*)     Creatinine 4.06 (*)     Sodium 133 (*)     Chloride 91 (*)     Calcium 10.7 (*)     ALT (SGPT) 49 (*)     AST (SGOT) 51 (*)     Alkaline Phosphatase 133 (*)     Total Bilirubin 1.8 (*)     eGFR Non  Amer 14 (*)     Anion Gap 19.0 (*)     All other components within normal limits    Narrative:     GFR Normal >60  Chronic Kidney Disease <60  Kidney Failure <15     CBC WITH AUTO DIFFERENTIAL - Abnormal; Notable for the following components:    .1 (*)     MCH 35.3 (*)     RDW 15.8 (*)     RDW-SD " 59.5 (*)     Platelets 111 (*)     Neutrophil % 84.3 (*)     Lymphocyte % 6.6 (*)     Eosinophil % 0.2 (*)     Neutrophils, Absolute 8.10 (*)     Lymphocytes, Absolute 0.60 (*)     nRBC 2.0 (*)     All other components within normal limits   TROPONIN (IN-HOUSE) - Abnormal; Notable for the following components:    Troponin T 0.253 (*)     All other components within normal limits    Narrative:     Troponin T Reference Range:  <= 0.03 ng/mL-   Negative for AMI  >0.03 ng/mL-     Abnormal for myocardial necrosis.  Clinicians would have to utilize clinical acumen, EKG, Troponin and serial changes to determine if it is an Acute Myocardial Infarction or myocardial injury due to an underlying chronic condition.       Results may be falsely decreased if patient taking Biotin.     PROTIME-INR - Abnormal; Notable for the following components:    Protime 14.3 (*)     INR 1.44 (*)     All other components within normal limits   BNP (IN-HOUSE) - Abnormal; Notable for the following components:    proBNP 66,299.0 (*)     All other components within normal limits    Narrative:     Among patients with dyspnea, NT-proBNP is highly sensitive for the detection of acute congestive heart failure. In addition NT-proBNP of <300 pg/ml effectively rules out acute congestive heart failure with 99% negative predictive value.    Results may be falsely decreased if patient taking Biotin.     LACTIC ACID, PLASMA - Abnormal; Notable for the following components:    Lactate 4.2 (*)     All other components within normal limits   BLOOD GAS, ARTERIAL - Abnormal; Notable for the following components:    pH, Arterial 7.453 (*)     pCO2, Arterial 32.0 (*)     pO2, Arterial 116.1 (*)     Base Excess, Arterial -0.7 (*)     O2 Saturation, Arterial 98.8 (*)     All other components within normal limits   APTT - Abnormal; Notable for the following components:    PTT 23.3 (*)     All other components within normal limits   POC LACTATE - Abnormal; Notable for  the following components:    Lactate 2.9 (*)     All other components within normal limits   RESPIRATORY PANEL, PCR - Normal   BLOOD CULTURE   BLOOD CULTURE   BLOOD GAS, ARTERIAL   RAINBOW DRAW    Narrative:     The following orders were created for panel order [place] Draw.  Procedure                               Abnormality         Status                     ---------                               -----------         ------                     Light Blue Top[[id number]]                                                              Green Top (Gel)[[id number]]                                                             Lavender Top[[id number]]                                     Final result               Gold Top - SST[[id number]]                                   Final result                 Please view results for these tests on the individual orders.   RAINBOW DRAW    Narrative:     The following orders were created for panel order [place] Draw.  Procedure                               Abnormality         Status                     ---------                               -----------         ------                     Light Blue Top[[id number]]                                   Final result               Green Top (Gel)[[id number]]                                  Final result               Lavender Top[[id number]]                                                                Gold Top - SST[[id number]]                                                                Please view results for these tests on the individual orders.   TROPONIN (IN-HOUSE)   LACTIC ACID REFLEX TIMER   CBC AND DIFFERENTIAL    Narrative:     The following orders were created for panel order CBC & Differential.  Procedure                               Abnormality         Status                     ---------                               -----------         ------                     CBC Auto Differential[[id number]]        Abnormal            Final  result                 Please view results for these tests on the individual orders.   LAVENDER TOP   GOLD TOP - SST   LIGHT BLUE TOP   GREEN TOP   LIGHT BLUE TOP   GREEN TOP   LAVENDER TOP   GOLD TOP - SST     Medications   sodium chloride 0.9 % flush 10 mL (has no administration in time range)   sodium chloride 0.9 % flush 10 mL (has no administration in time range)   heparin 61403 units/250 ml (100 units/ml) in D5W (12 Units/kg/hr × 73.5 kg Intravenous New Bag 1/31/20 2055)   heparin bolus from bag 2,200 Units (has no administration in time range)   heparin bolus from bag 4,400 Units (has no administration in time range)   ipratropium-albuterol (DUO-NEB) nebulizer solution 3 mL (3 mL Nebulization Given 1/31/20 2000)   albuterol (PROVENTIL) nebulizer solution 0.083% 2.5 mg/3mL (2.5 mg Nebulization Given 1/31/20 1955)   ondansetron (ZOFRAN) injection 4 mg (4 mg Intravenous Given 1/31/20 1955)   ceFEPime (MAXIPIME) in SWFI 2g/10ml IV PUSH syringe (2 g Intravenous Given 1/31/20 2043)   vancomycin 1250 mg/250 mL 0.9% NS IVPB ([facility]) (1,250 mg Intravenous New Bag 1/31/20 2043)   heparin bolus from bag 4,400 Units (4,400 Units Intravenous Bolus from Bag 1/31/20 2059)     Xr Chest 1 View    Result Date: 1/31/2020   1. Cardiomegaly. 2. Abnormal medial left basilar consolidation suspicious for either chronic scarring or atelectasis. There is also a hiatal hernia.  Electronically Signed By-Adrian Morris On:1/31/2020 8:27 PM This report was finalized on [id number] by  Adrian Morris, .                                               MDM  Number of Diagnoses or Management Options  Acute on chronic congestive heart failure, unspecified heart failure type (CMS/HCC):   Acute renal failure, unspecified acute renal failure type (CMS/HCC):   Chest pain, unspecified type:   Dyspnea, unspecified type:   Diagnosis management comments: MEDICAL DECISION  Epic Chart Review: Patient was noted to have been admitted to [facility] on  1/23/2020, discharged on 1/25/2020 for similar complaints of chest pain, cough, shortness of breath.  Patient had 2D echo done during this visit on 1/24/2020 which showed:  Impression  Moderate to significant mitral regurgitation.  Moderate tricuspid regurgitation  Biatrial enlargement  Significant biventricular enlargement and dysfunction with ejection fraction of 20%.  Findings consistent with cardiomyopathy.  ICD leads are present in the right ventricle.  No pericardial effusion or intracardiac thrombus is seen.  Comorbidities: Hypertension, hyperlipidemia, CHF, atrial fibrillation  Differentials: Myocardial ischemia, angina, no pulmonary embolism, pneumothorax, pleural effusion, pneumonia; this list is not all inclusive and does not constitute the entirety of considered causes  Radiology interpretation:  Images reviewed by me and interpreted by radiologist,   XR Chest  Final result by Adrian Morris MD (01/31/20 20:27:58)    Impression:        1. Cardiomegaly.  2. Abnormal medial left basilar consolidation suspicious for either  chronic scarring or atelectasis. There is also a hiatal hernia.     Electronically Signed By-Adrian Morris On:1/31/2020 8:27 PM  This report was finalized on [id number] by  Adrian Morris, .    Narrative:     DATE OF EXAM:  1/31/2020 8:08 PM     PROCEDURE:  XR CHEST 1 VW-     INDICATIONS:  Chest pain, tobacco abuse, shortness of breath, heart disease.      COMPARISON:  01/23/2020.     TECHNIQUE:   Single radiographic view of the chest was obtained.     FINDINGS:  The heart is enlarged. There are postsurgical changes apparent which  includes a left-sided transvenous pacemaker/intracardiac defibrillator.  There is abnormal medial left basilar consolidation suspicious for  chronic scarring or atelectasis. The patient has a large hiatal hernia.  The right lung is clear. There are no pleural effusions.  There are  chronic age-related changes involving the bony thorax and thoracic  aorta.    Lab  interpretation:  Labs viewed by me significant for, POC lactate 2.9.  CMP glucose 119, BUN 27, creatinine 4.09, sodium 133, chloride 91, alk phos 133.  CBC WBC count normal, MCV elevated 107.1, platelet low 111.  Troponin elevated 0.253.  PT/INR 13.3, 1.44.  BNP 66,299.  APTT 23.3    Patient was evaluated by myself and Dr. Satinder Campbell in the emergency room.  Patient had peripheral IV placed, lab work obtained.  Respiratory was called immediately for ABG as well as DuoNeb and albuterol treatments.  ABG significant for pH 7.45, CO2 low at 32.0, PO2 elevated 116.1.  Patient triggered sepsis for hypoxia, tachycardia and protocol was initiated.  POC lactate came back at 2.9, patient was given cefepime and Vanco while here in the ER.  Patient was placed on nasal cannula, high flow SPO2 increased from 81% to 97%.  Lab work significant for elevated BNP of 66, 299, significantly elevated from his last BMP which was done 6 days ago of 30,044.  Patient troponin elevated 0.253, troponin 6 days ago was 0.039.  Immediate cardiology consult was placed, I and Dr. Satinder Campbell spoke with Dr. Shirley who did not feel intervention was necessary at this time.  Started patient on heparin while here in the ER.   Patient's renal function also mildly elevated compared to admission 6 days ago, today's BUN/creatinine 77 and 4.06 respectively, 3 days ago BUN and creatinine were 58 and 3.58 respectively.  Patient currently sees Dr. West.  On reevaluation patient reports chest pain has improved, denies worsening shortness of breath, no episodes of emesis while here in the ER.  EKG interpretation, reviewed myself, interpreted by Dr. Satinder Campbell, atrial fibrillation rate of 115, no ST elevations or depressions.  Patient vital signs remained stable, afebrile, blood pressure 105/84, slightly elevated heart rate at 115.  Consult was to hospitalist, spoke with Dr. Edmar Haines who agreed accept patient for admission.       Amount and/or  Complexity of Data Reviewed  Clinical lab tests: reviewed and ordered  Tests in the radiology section of CPT®: reviewed and ordered  Tests in the medicine section of CPT®: reviewed    Patient Progress  Patient progress: stable      Final diagnoses:   Chest pain, unspecified type   Dyspnea, unspecified type   Acute on chronic congestive heart failure, unspecified heart failure type (CMS/HCC)   Acute renal failure, unspecified acute renal failure type (CMS/HCC)                   Kaila Ojeda PA  01/31/20 [id number]

## 2020-02-01 NOTE — PLAN OF CARE
Pt admitted from ED with CP. Pt noted to have EKG changes during shift. EKG showed pt to be in and out of A-flutter. ST depression present. Troponins trending upwards. Dr. Shirley notified. Will monitor.

## 2020-02-01 NOTE — PROCEDURES
Arterial catheter Placement  Date:  2/1/2020  Time:  6 PM     Indication: Hemodynamic monitoring        A time-out was completed verifying correct patient, procedure, site, positioning.  The patient’s  right radial artery was identified via ultrasound and the right wrist was prepped and draped in sterile fashion. 1% Lidocaine was used to anesthetize the surrounding skin area and subcutaneous tissue.  An 18-gauge needle was then introduced into the artery under ultrasound guidance at which time bright red pulsatile blood was visualized.  A guidewire was then passed smoothly through the needle and the needle removed.  An 18 arterial catheter was threaded over the wire smoothly using the Seldinger technique. The wire was removed and visualized to be intact.  Immediate return of bright red pulsatile blood was obtained from the catheter which was then connected to pressure tubing and a dicrotic notched waveform was viewed on the bedside monitor. The catheter was then sutured in place to the skin and a sterile dressing applied.      Estimated Blood Loss: 3 mL  The patient tolerated the procedure well and there were no complications.     Attending physician: Dr. Ariza

## 2020-02-01 NOTE — CONSULTS
"Pulmonary/ Critical Care/ sleep medicine Consult Note        Patient Name:  Luis E Rahman    :  1940    Medical Record:  [id number]    Requesting Physician    Yassine Haines MD    Primary Care Physician     Yassine Haines MD    Reason for consultation    Luis E Rahman is a 80 y.o. male who was referred for consultation for critical care management    History of present illness  The patient initially presented to the emergency department for evaluation of midsternal chest pain, cough, shortness of air, and hypotension.  He reports that he developed midsternal chest pain which \"feels like thousand needles\".  He also states he could not get his breath which led to a panic attack which led to increasing shortness of air.  He states that he has just generally felt bad for a couple of months and has had intermittent vomiting for a couple of days.  He is a very poor historian and cannot report that he has had any relieving or aggravating qualities to his symptoms.  He has also noted he states that his chest pain does not radiate into his arms, back, neck, or jaw.  He denies that he has had diarrhea, hematemesis, melena.  Reports that he has occasional dizziness.  He denies fever or ill contacts.  Of note the patient had been admitted to this facility on 2020 for evaluation of increased shortness of air.  At that time he was noted to have an elevated BNP and troponin.  Reports that he has had increasing bilateral lower extremity edema and that his diuretics had recently been increased.  He is a current, every day smoker and reports that he has had to increase his inhaler use recently.  He denies orthopnea.    The patient's wife states that he followed up with Dr. West in the office the day of admission and was found to be hypotensive.  He was given a prescription for \"a medicine to keep his blood pressure up\" however later in the day he continued to feel bad with generalized weakness and passing out and " came to the emergency department for further evaluation and was admitted.  Nephrology and cardiology were consulted.  Per cardiology the patient has severe ischemic cardiomyopathy, severe left ventricular dysfunction, congestive heart failure NYHA class IV, possibly with cardiogenic shock.  Echocardiogram revealed severe left ventricular dysfunction and mitral regurgitation.  The patient developed a rapid heart rate which was a flutter/A. fib with RVR.  He also developed hypotension with a systolic blood pressure of 80.  Dobutamine and amiodarone were initiated and the patient moved to the intensive care unit for further evaluation and treatment.    Review of Systems    as above    Medical History    Past Medical History:   Diagnosis Date   • Atrial fibrillation (CMS/[facility])    • CHF (congestive heart failure) (CMS/[facility])    • Hyperlipidemia    • Hypertension    • Myocardial infarction (CMS/[facility])         Surgical History    Past Surgical History:   Procedure Laterality Date   • CARDIAC CATHETERIZATION     • CARDIAC ELECTROPHYSIOLOGY PROCEDURE N/A 9/6/2019    Procedure: ICD DC generator change;  Surgeon: Amalia Leal MD;  Location: [facility] CATH INVASIVE LOCATION;  Service: Cardiovascular   • CARDIAC ELECTROPHYSIOLOGY PROCEDURE N/A 9/6/2019    Procedure: Pocket Revision;  Surgeon: Amalia Leal MD;  Location: [facility] CATH INVASIVE LOCATION;  Service: Cardiovascular   • CORONARY ANGIOPLASTY WITH STENT PLACEMENT     • CORONARY ARTERY BYPASS GRAFT     • PACEMAKER REPLACEMENT          Family History    No family history on file.    Social History    Social History     Tobacco Use   • Smoking status: Current Every Day Smoker     Packs/day: 0.50     Types: Cigarettes   • Smokeless tobacco: Never Used   Substance Use Topics   • Alcohol use: No     Frequency: Never        Allergies    No Known Allergies    Medications    Scheduled Meds:  Acetylcysteine 1,200 mg Oral Q12H   cefTRIAXone 1 g Intravenous Q24H   midodrine 5 mg  Oral TID AC     Continuous Infusions:  amiodarone 1 mg/min Last Rate: 1 mg/min (20)   Followed by     amiodarone 0.5 mg/min    DOBUTamine 2.5 mcg/kg/min Last Rate: 2.5 mcg/kg/min (20)   heparin 12 Units/kg/hr Last Rate: 10 Units/kg/hr (20 1305)   Pharmacy to dose vancomycin     sodium chloride 75 mL/hr Last Rate: 75 mL/hr (20 1459)     PRN Meds:.heparin  •  heparin  •  ondansetron  •  Pharmacy to dose vancomycin  •  sodium chloride  •  sodium chloride      Physical Exam    tMax 24 hrs:  Temp (24hrs), Av.7 °F (36.5 °C), Min:97.3 °F (36.3 °C), Max:98.6 °F (37 °C)    Vitals Ranges:  Temp:  [97.3 °F (36.3 °C)-98.6 °F (37 °C)] 98.2 °F (36.8 °C)  Heart Rate:  [] 99  Resp:  [16-20] 16  BP: ()/(52-84) 85/52  Intake and Output Last 3 Shifts:  I/O last 3 completed shifts:  In: 0   Out: 100 [Urine:100]    Constitutional:  Well developed, well nourished, mild anxiety, chronically ill-appearing  Eyes:  PERRL, conjunctiva normal   HENT:  Atraumatic, external ears normal, nose normal, oropharynx dry  Neck- normal range of motion, no tenderness, supple.  No JVD  Respiratory:  No respiratory distress, normal breath sounds, no rales, no wheezing   Cardiovascular: A. fib with RVR, systolic ejection murmur, no gallops, no rubs   GI:  Soft, nondistended, normal bowel sounds, nontender,  no rebound, no guarding   : Deferred  Musculoskeletal:  No edema, no tenderness, no deformities.  Integument: Poor turgor, no rash   Neurologic:  Alert & oriented x 3, no lateralizing deficits  Psychiatric:  Speech and behavior appropriate     labs    Lab Results (last 24 hours)     Procedure Component Value Units Date/Time    MRSA Screen, PCR - Swab, Nares [[id number]] Collected:  20    Specimen:  Swab from Nares Updated:  20    Hepatic Function Panel [[id number]] Collected:  20    Specimen:  Blood Updated:  20    POC Glucose Once [[id number]]  (Abnormal)  Collected:  02/01/20 1620    Specimen:  Blood Updated:  02/01/20 1623     Glucose 138 mg/dL      Comment: Serial Number: [id number]:  [id number]       Basic Metabolic Panel [[id number]]  (Abnormal) Collected:  02/01/20 1404    Specimen:  Blood Updated:  02/01/20 1440     Glucose 167 mg/dL      BUN 93 mg/dL      Creatinine 4.72 mg/dL      Sodium 133 mmol/L      Potassium 5.6 mmol/L      Chloride 92 mmol/L      CO2 22.0 mmol/L      Calcium 9.3 mg/dL      eGFR   Amer --     Comment: <15 Indicative of kidney failure.        eGFR Non African Amer 12 mL/min/1.73      Comment: <15 Indicative of kidney failure.        BUN/Creatinine Ratio 19.7     Anion Gap 19.0 mmol/L     Narrative:       GFR Normal >60  Chronic Kidney Disease <60  Kidney Failure <15      aPTT [[id number]]  (Abnormal) Collected:  02/01/20 1404    Specimen:  Blood Updated:  02/01/20 1427     PTT 59.6 seconds      Comment: Result checked        Uric Acid [[id number]]  (Abnormal) Collected:  02/01/20 0227    Specimen:  Blood Updated:  02/01/20 1300     Uric Acid 12.5 mg/dL     Blood Culture - Blood, Arm, Right [[id number]] Collected:  01/31/20 1949    Specimen:  Blood from Arm, Right Updated:  02/01/20 0801     Blood Culture No growth at less than 24 hours    Blood Culture - Blood, Arm, Left [[id number]] Collected:  01/1940    Specimen:  Blood from Arm, Left Updated:  02/01/20 0745     Blood Culture No growth at less than 24 hours    aPTT [[id number]]  (Abnormal) Collected:  02/01/20 0713    Specimen:  Blood Updated:  02/01/20 0730     PTT 76.7 seconds     Protime-INR [[id number]]  (Abnormal) Collected:  02/01/20 0713    Specimen:  Blood Updated:  02/01/20 0730     Protime 14.8 Seconds      INR 1.50    Troponin [[id number]]  (Abnormal) Collected:  02/01/20 0227    Specimen:  Blood Updated:  02/01/20 0322     Troponin T 0.286 ng/mL     Narrative:       Troponin T Reference Range:  <= 0.03 ng/mL-   Negative for AMI  >0.03 ng/mL-     Abnormal for  myocardial necrosis.  Clinicians would have to utilize clinical acumen, EKG, Troponin and serial changes to determine if it is an Acute Myocardial Infarction or myocardial injury due to an underlying chronic condition.       Results may be falsely decreased if patient taking Biotin.      Basic Metabolic Panel [[id number]]  (Abnormal) Collected:  02/01/20 0227    Specimen:  Blood Updated:  02/01/20 0319     Glucose 114 mg/dL      BUN 83 mg/dL      Creatinine 4.34 mg/dL      Sodium 133 mmol/L      Potassium 5.3 mmol/L      Chloride 92 mmol/L      CO2 25.0 mmol/L      Calcium 10.1 mg/dL      eGFR   Amer --     Comment: <15 Indicative of kidney failure.        eGFR Non African Amer 13 mL/min/1.73      Comment: <15 Indicative of kidney failure.        BUN/Creatinine Ratio 19.1     Anion Gap 16.0 mmol/L     Narrative:       GFR Normal >60  Chronic Kidney Disease <60  Kidney Failure <15      aPTT [[id number]]  (Abnormal) Collected:  02/01/20 0227    Specimen:  Blood Updated:  02/01/20 0315     PTT 92.5 seconds     Lactic Acid, Reflex [[id number]]  (Abnormal) Collected:  01/31/20 2339    Specimen:  Blood Updated:  02/01/20 0007     Lactate 3.3 mmol/L     Lactic Acid, Reflex Timer (This will reflex a repeat order 3-3:15 hours after ordered.) [[id number]] Collected:  01/1940    Specimen:  Blood Updated:  01/31/20 2330     Extra Tube Hold for add-ons.     Comment: Auto resulted.       Troponin [[id number]]  (Abnormal) Collected:  01/31/20 2123    Specimen:  Blood from Arm, Right Updated:  01/31/20 2232     Troponin T 0.265 ng/mL     Narrative:       Troponin T Reference Range:  <= 0.03 ng/mL-   Negative for AMI  >0.03 ng/mL-     Abnormal for myocardial necrosis.  Clinicians would have to utilize clinical acumen, EKG, Troponin and serial changes to determine if it is an Acute Myocardial Infarction or myocardial injury due to an underlying chronic condition.       Results may be falsely decreased if patient taking  Biotin.      Respiratory Panel, PCR - Swab, Nasopharynx [[id number]]  (Normal) Collected:  01/31/20 1949    Specimen:  Swab from Nasopharynx Updated:  01/31/20 2127     ADENOVIRUS, PCR Not Detected     Coronavirus 229E Not Detected     Coronavirus HKU1 Not Detected     Coronavirus NL63 Not Detected     Coronavirus OC43 Not Detected     Human Metapneumovirus Not Detected     Human Rhinovirus/Enterovirus Not Detected     Influenza B PCR Not Detected     Parainfluenza Virus 1 Not Detected     Parainfluenza Virus 2 Not Detected     Parainfluenza Virus 3 Not Detected     Parainfluenza Virus 4 Not Detected     Bordetella pertussis pcr Not Detected     Influenza A H1 2009 PCR Not Detected     Chlamydophila pneumoniae PCR Not Detected     Mycoplasma pneumo by PCR Not Detected     Influenza A PCR Not Detected     Influenza A H3 Not Detected     Influenza A H1 Not Detected     RSV, PCR Not Detected    aPTT [[id number]]  (Abnormal) Collected:  01/1940    Specimen:  Blood Updated:  01/31/20 2053     PTT 23.3 seconds     [place] Draw [[id number]] Collected:  01/1940    Specimen:  Blood Updated:  01/31/20 2045    Narrative:       The following orders were created for panel order [place] Draw.  Procedure                               Abnormality         Status                     ---------                               -----------         ------                     Light Blue Top[[id number]]                                                              Green Top (Gel)[[id number]]                                                             Lavender Top[[id number]]                                     Final result               Gold Top - SST[[id number]]                                   Final result                 Please view results for these tests on the individual orders.    Lavender Top [[id number]] Collected:  01/1940    Specimen:  Blood Updated:  01/31/20 2045     Extra Tube hold for add-on     Comment: Auto resulted        Gold Top - SST [[id number]] Collected:  01/1940    Specimen:  Blood Updated:  01/31/20 2045     Extra Tube Hold for add-ons.     Comment: Auto resulted.       [place] Draw [[id number]] Collected:  01/1940    Specimen:  Blood Updated:  01/31/20 2045    Narrative:       The following orders were created for panel order [place] Draw.  Procedure                               Abnormality         Status                     ---------                               -----------         ------                     Light Blue Top[[id number]]                                   Final result               Green Top (Gel)[[id number]]                                  Final result               Lavender Top[[id number]]                                                                Gold Top - SST[[id number]]                                                                Please view results for these tests on the individual orders.    Light Blue Top [[id number]] Collected:  01/1940    Specimen:  Blood Updated:  01/31/20 2045     Extra Tube hold for add-on     Comment: Auto resulted       Green Top (Gel) [[id number]] Collected:  01/1940    Specimen:  Blood Updated:  01/31/20 2045     Extra Tube Hold for add-ons.     Comment: Auto resulted.       BNP [[id number]]  (Abnormal) Collected:  01/1940    Specimen:  Blood Updated:  01/31/20 2027     proBNP 66,299.0 pg/mL     Narrative:       Among patients with dyspnea, NT-proBNP is highly sensitive for the detection of acute congestive heart failure. In addition NT-proBNP of <300 pg/ml effectively rules out acute congestive heart failure with 99% negative predictive value.    Results may be falsely decreased if patient taking Biotin.      POC Lactate [[id number]]  (Abnormal) Collected:  01/31/20 2004    Specimen:  Blood Updated:  01/31/20 2022     Lactate 2.9 mmol/L      Comment: Serial Number: [id number]:  [id number]       Troponin [[id number]]  (Abnormal) Collected:   01/1940    Specimen:  Blood Updated:  01/31/20 2022     Troponin T 0.253 ng/mL     Narrative:       Troponin T Reference Range:  <= 0.03 ng/mL-   Negative for AMI  >0.03 ng/mL-     Abnormal for myocardial necrosis.  Clinicians would have to utilize clinical acumen, EKG, Troponin and serial changes to determine if it is an Acute Myocardial Infarction or myocardial injury due to an underlying chronic condition.       Results may be falsely decreased if patient taking Biotin.      Comprehensive Metabolic Panel [[id number]]  (Abnormal) Collected:  01/1940    Specimen:  Blood Updated:  01/31/20 2020     Glucose 118 mg/dL      BUN 77 mg/dL      Creatinine 4.06 mg/dL      Sodium 133 mmol/L      Potassium 5.0 mmol/L      Chloride 91 mmol/L      CO2 23.0 mmol/L      Calcium 10.7 mg/dL      Total Protein 7.1 g/dL      Albumin 4.00 g/dL      ALT (SGPT) 49 U/L      AST (SGOT) 51 U/L      Comment: Specimen hemolyzed.  Results may be affected.        Alkaline Phosphatase 133 U/L      Total Bilirubin 1.8 mg/dL      eGFR Non African Amer 14 mL/min/1.73      Comment: <15 Indicative of kidney failure.        eGFR   Amer --     Comment: <15 Indicative of kidney failure.        Globulin 3.1 gm/dL      A/G Ratio 1.3 g/dL      BUN/Creatinine Ratio 19.0     Anion Gap 19.0 mmol/L     Narrative:       GFR Normal >60  Chronic Kidney Disease <60  Kidney Failure <15      Lactic Acid, Plasma [[id number]]  (Abnormal) Collected:  01/1940    Specimen:  Blood Updated:  01/31/20 2020     Lactate 4.2 mmol/L     Blood Gas, Arterial [[id number]]  (Abnormal) Collected:  01/31/20 2004    Specimen:  Arterial Blood Updated:  01/31/20 2008     Site Left Brachial     Binh's Test N/A     pH, Arterial 7.453 pH units      pCO2, Arterial 32.0 mm Hg      pO2, Arterial 116.1 mm Hg      HCO3, Arterial 22.4 mmol/L      Base Excess, Arterial -0.7 mmol/L      Comment: Serial Number: [id number]:  [id number]        O2 Saturation, Arterial 98.8 %       CO2 Content 23.4 mmol/L      Barometric Pressure for Blood Gas --     Comment: N/A        Modality Cannula     FIO2 44 %      Hemodilution No    Protime-INR [[id number]]  (Abnormal) Collected:  01/1940    Specimen:  Blood Updated:  01/31/20 1952     Protime 14.3 Seconds      INR 1.44    CBC & Differential [[id number]] Collected:  01/1940    Specimen:  Blood Updated:  01/31/20 1949    Narrative:       The following orders were created for panel order CBC & Differential.  Procedure                               Abnormality         Status                     ---------                               -----------         ------                     CBC Auto Differential[[id number]]        Abnormal            Final result                 Please view results for these tests on the individual orders.    CBC Auto Differential [[id number]]  (Abnormal) Collected:  01/1940    Specimen:  Blood Updated:  01/31/20 1949     WBC 9.60 10*3/mm3      RBC 4.57 10*6/mm3      Hemoglobin 16.1 g/dL      Hematocrit 48.9 %      .1 fL      MCH 35.3 pg      MCHC 32.9 g/dL      RDW 15.8 %      RDW-SD 59.5 fl      MPV 10.7 fL      Platelets 111 10*3/mm3      Neutrophil % 84.3 %      Lymphocyte % 6.6 %      Monocyte % 8.2 %      Eosinophil % 0.2 %      Basophil % 0.7 %      Neutrophils, Absolute 8.10 10*3/mm3      Lymphocytes, Absolute 0.60 10*3/mm3      Monocytes, Absolute 0.80 10*3/mm3      Eosinophils, Absolute 0.00 10*3/mm3      Basophils, Absolute 0.10 10*3/mm3      nRBC 2.0 /100 WBC           Imaging & Other Studies    Imaging Results (Last 72 Hours)     Procedure Component Value Units Date/Time    XR Chest 1 View [[id number]] Collected:  01/31/20 2014     Updated:  01/31/20 2030    Narrative:       DATE OF EXAM:  1/31/2020 8:08 PM     PROCEDURE:  XR CHEST 1 VW-     INDICATIONS:  Chest pain, tobacco abuse, shortness of breath, heart disease.      COMPARISON:  01/23/2020.     TECHNIQUE:   Single radiographic view of the  chest was obtained.     FINDINGS:  The heart is enlarged. There are postsurgical changes apparent which  includes a left-sided transvenous pacemaker/intracardiac defibrillator.  There is abnormal medial left basilar consolidation suspicious for  chronic scarring or atelectasis. The patient has a large hiatal hernia.  The right lung is clear. There are no pleural effusions.  There are  chronic age-related changes involving the bony thorax and thoracic  aorta.       Impression:          1. Cardiomegaly.  2. Abnormal medial left basilar consolidation suspicious for either  chronic scarring or atelectasis. There is also a hiatal hernia.     Electronically Signed By-Adrian Morris On:1/31/2020 8:27 PM  This report was finalized on [id number] by  Adrian Morris, .          Assessment    Hypotension, etiology possibly cardiogenic shock  -Dobutamine initiated  -Blood cultures pending  -Lactate 3.3    Acute hypoxemic respiratory failure  -Etiology likely fluid volume overload secondary to cardiogenic shock  -Respiratory viral panel negative  -Procalcitonin pending  -Titrate O2 as tolerated to maintain a saturation of 90-91%  -Empiric antibiotics with ceftriaxone, will add Zithromax    Severe ischemic cardiomyopathy, EF 20% by echocardiogram 1/24/2020  -Cardiology following closely    CHF  -Diurese as tolerated by blood pressure    A. fib/a flutter with RVR  -Amiodarone drip per cardiology  -Heparin drip    COPD, not in acute exacerbation  -Duo nebs PRN  -Continue Symbicort    Chronic kidney disease stage III  -Nephrology following closely    Coronary artery disease with history of CABG and stents  -Cardiology following  -Restart home medications when clinically appropriate    History of hypertension  -Antihypertensives on hold due to hypotension.  Restart when clinically appropriate    AICD in situ    Tobacco abuse 1 to 2 packs/day x 70 years  -Recommend cessation    PUD prophylaxis with Protonix  DVT prophylaxis with heparin  drip      Plan    See orders. The plan was discussed with the patient and his family      I thank you for this opportunity to take part in this patient's care and will follow the patient along with you.    Attending physician statement:  Patient remains critically ill.  Total critical care time spent is 32 minutes which does not include any time for procedures.  Critical care time is exclusive of time spent by the nurse practitioner.  Above note scribed by nurse practitioner for me and later reviewed/modified by me for accuracy . I've examined the patient and reviewed all labs and images.  I have directly participated in the evaluation and management of this patient.  Devon Ariza MD  Pulmonary and [facility]  [facility]

## 2020-02-01 NOTE — H&P
"    Patient Care Team:  Yassine Haines MD as PCP - General  Yassine Haines MD as PCP - Family Medicine  Amalia Leal MD as PCP - Claims Attributed  Amalia Leal MD as Consulting Physician (Cardiology)  Trang West MD as Consulting Physician (Nephrology)    Chief complaint \" I had chest pain. I was puking, I was lightheaded and dizzy, called EMS brought me to the hospital\"  Subjective     The patient is a 80 y.o. male was in his Patient was in his usual state of health 3 weeks ago.  The patient says about 3 weeks ago he started having some cough, expectoration increased shortness of breath.  And since then he has increased swelling in both lower extremities.  He has been increasing his diuretics.  Unortunately he continues to smoke.  Says that he has been using is his albuterol inhale rmore frequently than before. He was admitted to this hospital on 1/25/2020 while he was at [employer] he had some increased shortness of breath.  When coming out to the cold air had severe shortness of breath and sternal chest pain started using his albuterol inhaler he was unable to take deep breaths.  Came to the emergency room and was evaluated found that his BNP is elevated as well as his troponin.  Been started on nitroglycerin patch and given diuretics.  I saw him this morning he was chest pain-free.  Not have any potation or fluttering.  Was laying in the bed at 0 degrees angle doubt any orthopnea.  He was speaking full sentences.  He definitely has a cough with some expectoration sputum is green in color.  Denies any hemoptysis.  He denies any painful breathing.  He will has some substernal discomfort.  He does not have any difficulty swallowing.  He does not have any reflux.  Patient was admitted to the emergency room.  He was seen by cardiology and nephrology.  Patient's medications were adjusted.  He was advised to stay in the hospital unfortunately to go home.  Was discharged to home with " instructions to follow closely with  next week.    The patient was brought to the emergency room by EMS complaints of chest pain, cough, shortness of breath.  He says that he has exertional dyspnea.  Not have a cough or expectoration.  His appetite has been poor.  He has not been eating and of food.  Fortunately he continues to smoke.  Yesterday his chest pain was in of the pressure sternal.  It did not have any association with exertion.  Did not have any hemoptysis.  He does not have any painful breathing.  Today he also had some dry heaves and vomiting.  Is been very dizzy and lightheaded.  Not a good historian.  He denies any fever or chills.  He denies any sputum.  Feels very lightheaded.    Review of Systems   Constitutional: Positive for activity change, appetite change, fatigue and unexpected weight change. Negative for chills, diaphoresis and fever.   HENT: Positive for dental problem.    Eyes: Negative.    Respiratory: Positive for chest tightness and shortness of breath. Negative for apnea, cough, choking, wheezing and stridor.    Cardiovascular: Positive for chest pain and palpitations. Negative for leg swelling.   Gastrointestinal: Positive for nausea and vomiting. Negative for abdominal distention, abdominal pain, anal bleeding, blood in stool, constipation, diarrhea and rectal pain.   Endocrine: Negative.    Genitourinary: Negative.    Musculoskeletal: Positive for back pain and myalgias.   Skin: Negative.    Allergic/Immunologic: Negative.    Neurological: Positive for dizziness, weakness and light-headedness. Negative for tremors, seizures, syncope, facial asymmetry, speech difficulty, numbness and headaches.   Hematological: Negative.    Psychiatric/Behavioral: Negative.           History  Past Medical History:   Diagnosis Date   • Atrial fibrillation (CMS/HCC)    • CHF (congestive heart failure) (CMS/HCC)    • Hyperlipidemia    • Hypertension    • Myocardial infarction (CMS/HCC)       Past Surgical History:   Procedure Laterality Date   • CARDIAC CATHETERIZATION     • CARDIAC ELECTROPHYSIOLOGY PROCEDURE N/A 9/6/2019    Procedure: ICD DC generator change;  Surgeon: Amalia Leal MD;  Location: [facility] CATH INVASIVE LOCATION;  Service: Cardiovascular   • CARDIAC ELECTROPHYSIOLOGY PROCEDURE N/A 9/6/2019    Procedure: Pocket Revision;  Surgeon: Amalia Leal MD;  Location: [facility] CATH INVASIVE LOCATION;  Service: Cardiovascular   • CORONARY ANGIOPLASTY WITH STENT PLACEMENT     • CORONARY ARTERY BYPASS GRAFT     • PACEMAKER REPLACEMENT       No family history on file.  Social History     Tobacco Use   • Smoking status: Current Every Day Smoker     Packs/day: 0.50     Types: Cigarettes   • Smokeless tobacco: Never Used   Substance Use Topics   • Alcohol use: No     Frequency: Never   • Drug use: Not on file     Medications Prior to Admission   Medication Sig Dispense Refill Last Dose   • albuterol sulfate HFA (VENTOLIN HFA) 108 (90 Base) MCG/ACT inhaler Inhale 1 puff Every 6 (Six) Hours As Needed for Shortness of Air.   Not Taking   • amiodarone (PACERONE) 200 MG tablet Take 200 mg by mouth Daily.      • aspirin (ASPIR-LOW) 81 MG EC tablet Take 81 mg by mouth Daily.   Taking   • atorvastatin (LIPITOR) 10 MG tablet Take 10 mg by mouth Daily.      • budesonide-formoterol (SYMBICORT) 160-4.5 MCG/ACT inhaler Inhale 2 puffs 2 (Two) Times a Day.      • bumetanide (BUMEX) 2 MG tablet Take 1 tablet by mouth 2 (Two) Times a Day for 30 days. 60 tablet 0    • carvedilol (COREG) 3.125 MG tablet Take 1 tablet by mouth 2 (Two) Times a Day for 30 days. 60 tablet 3    • clopidogrel (PLAVIX) 75 MG tablet Take 75 mg by mouth Daily.      • febuxostat (ULORIC) 40 MG tablet Take 1 tablet by mouth Daily for 30 days. 30 tablet 5    • FEROSUL 325 (65 Fe) MG tablet Take 325 mg by mouth Daily With Breakfast.   Taking   • levothyroxine (SYNTHROID, LEVOTHROID) 50 MCG tablet Take 1 tablet by mouth Daily for 30 days. 30  "tablet 3    • vitamin D (ERGOCALCIFEROL) 75026 units capsule capsule Take 50,000 Units by mouth Every 14 (Fourteen) Days.   Taking     Allergies:  Patient has no known allergies.    Objective     Vital Signs  BP 95/71 (Patient Position: Lying)   Pulse 118   Temp 97.4 °F (36.3 °C) (Oral)   Resp 16   Ht 180.3 cm (71\")   Wt 67.9 kg (149 lb 11.1 oz)   SpO2 95%   BMI 20.88 kg/m²       Physical Exam:      General Appearance:    Alert, cooperative, in no acute distress   Head:    Normocephalic, without obvious abnormality, atraumatic   Eyes:            Lids and lashes normal, conjunctivae and sclerae normal, no   icterus, no pallor, corneas clear, PERRLA   Ears:    Ears appear intact with no abnormalities noted   Throat:   No oral lesions, no thrush, oral mucosa moist   Neck:   No adenopathy, supple, trachea midline, no thyromegaly, no   carotid bruit, no JVD   Lungs:     Clear to auscultation,respirations regular, even and                  unlabored    Heart:    Regular rhythm and normal rate, normal S1 and S2, no            murmur, no gallop, no rub, no click   Chest Wall:    No abnormalities observed   Abdomen:     Normal bowel sounds, no masses, no organomegaly, soft        non-tender, non-distended, no guarding, no rebound                tenderness   Extremities:   Moves all extremities well, no edema, no cyanosis, no             redness   Pulses:   Pulses palpable and equal bilaterally   Skin:   No bleeding, bruising or rash   Lymph nodes:   No palpable adenopathy   Neurologic:   Cranial nerves 2 - 12 grossly intact, sensation intact, DTR       present and equal bilaterally       Results Review:     Imaging Results (Last 24 Hours)     Procedure Component Value Units Date/Time    XR Chest 1 View [[id number]] Collected:  01/31/20 2014     Updated:  01/31/20 2030    Narrative:       DATE OF EXAM:  1/31/2020 8:08 PM     PROCEDURE:  XR CHEST 1 VW-     INDICATIONS:  Chest pain, tobacco abuse, shortness of breath, " heart disease.      COMPARISON:  01/23/2020.     TECHNIQUE:   Single radiographic view of the chest was obtained.     FINDINGS:  The heart is enlarged. There are postsurgical changes apparent which  includes a left-sided transvenous pacemaker/intracardiac defibrillator.  There is abnormal medial left basilar consolidation suspicious for  chronic scarring or atelectasis. The patient has a large hiatal hernia.  The right lung is clear. There are no pleural effusions.  There are  chronic age-related changes involving the bony thorax and thoracic  aorta.       Impression:          1. Cardiomegaly.  2. Abnormal medial left basilar consolidation suspicious for either  chronic scarring or atelectasis. There is also a hiatal hernia.     Electronically Signed By-Adrian Morris On:1/31/2020 8:27 PM  This report was finalized on [id number] by  Adrian Morris, .             ECG/EMG Results (last 24 hours)     Procedure Component Value Units Date/Time    ECG 12 Lead [[id number]]  (Abnormal) Resulted:  01/31/20 2130     Updated:  01/31/20 2130    ECG 12 Lead [[id number]] Collected:  02/01/20 0116     Updated:  02/01/20 0117    Narrative:       HEART RATE= 111  bpm  RR Interval= 557  ms  IL Interval=   ms  P Horizontal Axis=   deg  P Front Axis=   deg  QRSD Interval= 171  ms  QT Interval= 408  ms  QRS Axis= 127  deg  T Wave Axis= -52  deg  - ABNORMAL ECG -  Atrial flutter with predominant 2:1 AV block  Nonspecific intraventricular conduction delay  Probable anterolateral infarct, old  ST depr, consider ischemia, inferior leads  Electronically Signed By:   Date and Time of Study: 2020-02-01 01:16:37    ECG 12 Lead [[id number]] Collected:  01/1940     Updated:  02/01/20 0733    Narrative:       HEART RATE= 115  bpm  RR Interval= 522  ms  IL Interval=   ms  P Horizontal Axis=   deg  P Front Axis=   deg  QRSD Interval= 144  ms  QT Interval= 387  ms  QRS Axis= 113  deg  T Wave Axis= -62  deg  - ABNORMAL ECG -  Atrial  fibrillation  Borderline repol abnormality, diffuse leads  When compared with ECG of 2020 0:10:36,  Significant change in rhythm  Electronically Signed By: Satinder Campbell (Geoff) 2020 07:33:07  Date and Time of Study: 2020 19:38:18        Patient Information     Patient Name  Luis E Rahman MRN  [id number] Sex  Male  (Age)  1940 (80 y.o.)   Sedation Narrator Report     Sedation Narrator Report   Interpretation Summary        · Estimated EF = 20%.  · Left ventricular systolic function is severely decreased.     Indications  Shortness of breath     Technically satisfactory study.  Mitral valve is structurally normal.  Moderate to significant mitral regurgitation is present.  Tricuspid valve is structurally normal.  Moderate tricuspid regurgitation  Aortic valve is structurally normal.  Mild aortic regurgitation  Pulmonic valve could not be well visualized.  Biatrial enlargement  Significant biventricular enlargement  Significant biventricular diffuse hypocontractility with ejection fraction of 20%.  Atrial septum is intact.  Aorta is normal.  No pericardial effusion or intracardiac thrombus is seen.     Impression  Moderate to significant mitral regurgitation.  Moderate tricuspid regurgitation  Biatrial enlargement  Significant biventricular enlargement and dysfunction with ejection fraction of 20%.  Findings consistent with cardiomyopathy.  ICD leads are present in the right ventricle.  No pericardial effusion or intracardiac thrombus is seen.         Lab Results (last 24 hours)     Procedure Component Value Units Date/Time    Blood Culture - Blood, Arm, Right [[id number]] Collected:  20    Specimen:  Blood from Arm, Right Updated:  20 0801     Blood Culture No growth at less than 24 hours    Blood Culture - Blood, Arm, Left [[id number]] Collected:  20    Specimen:  Blood from Arm, Left Updated:  20 0745     Blood Culture No growth at less than 24 hours    aPTT  [[id number]]  (Abnormal) Collected:  02/01/20 0713    Specimen:  Blood Updated:  02/01/20 0730     PTT 76.7 seconds     Protime-INR [[id number]]  (Abnormal) Collected:  02/01/20 0713    Specimen:  Blood Updated:  02/01/20 0730     Protime 14.8 Seconds      INR 1.50    Troponin [[id number]]  (Abnormal) Collected:  02/01/20 0227    Specimen:  Blood Updated:  02/01/20 0322     Troponin T 0.286 ng/mL     Narrative:       Troponin T Reference Range:  <= 0.03 ng/mL-   Negative for AMI  >0.03 ng/mL-     Abnormal for myocardial necrosis.  Clinicians would have to utilize clinical acumen, EKG, Troponin and serial changes to determine if it is an Acute Myocardial Infarction or myocardial injury due to an underlying chronic condition.       Results may be falsely decreased if patient taking Biotin.      Basic Metabolic Panel [[id number]]  (Abnormal) Collected:  02/01/20 0227    Specimen:  Blood Updated:  02/01/20 0319     Glucose 114 mg/dL      BUN 83 mg/dL      Creatinine 4.34 mg/dL      Sodium 133 mmol/L      Potassium 5.3 mmol/L      Chloride 92 mmol/L      CO2 25.0 mmol/L      Calcium 10.1 mg/dL      eGFR   Amer --     Comment: <15 Indicative of kidney failure.        eGFR Non African Amer 13 mL/min/1.73      Comment: <15 Indicative of kidney failure.        BUN/Creatinine Ratio 19.1     Anion Gap 16.0 mmol/L     Narrative:       GFR Normal >60  Chronic Kidney Disease <60  Kidney Failure <15      aPTT [[id number]]  (Abnormal) Collected:  02/01/20 0227    Specimen:  Blood Updated:  02/01/20 0315     PTT 92.5 seconds     Lactic Acid, Reflex [[id number]]  (Abnormal) Collected:  01/31/20 2339    Specimen:  Blood Updated:  02/01/20 0007     Lactate 3.3 mmol/L     Lactic Acid, Reflex Timer (This will reflex a repeat order 3-3:15 hours after ordered.) [[id number]] Collected:  01/1940    Specimen:  Blood Updated:  01/31/20 2330     Extra Tube Hold for add-ons.     Comment: Auto resulted.       Troponin [[id number]]   (Abnormal) Collected:  01/31/20 2123    Specimen:  Blood from Arm, Right Updated:  01/31/20 2232     Troponin T 0.265 ng/mL     Narrative:       Troponin T Reference Range:  <= 0.03 ng/mL-   Negative for AMI  >0.03 ng/mL-     Abnormal for myocardial necrosis.  Clinicians would have to utilize clinical acumen, EKG, Troponin and serial changes to determine if it is an Acute Myocardial Infarction or myocardial injury due to an underlying chronic condition.       Results may be falsely decreased if patient taking Biotin.      Respiratory Panel, PCR - Swab, Nasopharynx [[id number]]  (Normal) Collected:  01/31/20 1949    Specimen:  Swab from Nasopharynx Updated:  01/31/20 2127     ADENOVIRUS, PCR Not Detected     Coronavirus 229E Not Detected     Coronavirus HKU1 Not Detected     Coronavirus NL63 Not Detected     Coronavirus OC43 Not Detected     Human Metapneumovirus Not Detected     Human Rhinovirus/Enterovirus Not Detected     Influenza B PCR Not Detected     Parainfluenza Virus 1 Not Detected     Parainfluenza Virus 2 Not Detected     Parainfluenza Virus 3 Not Detected     Parainfluenza Virus 4 Not Detected     Bordetella pertussis pcr Not Detected     Influenza A H1 2009 PCR Not Detected     Chlamydophila pneumoniae PCR Not Detected     Mycoplasma pneumo by PCR Not Detected     Influenza A PCR Not Detected     Influenza A H3 Not Detected     Influenza A H1 Not Detected     RSV, PCR Not Detected    aPTT [[id number]]  (Abnormal) Collected:  01/1940    Specimen:  Blood Updated:  01/31/20 2053     PTT 23.3 seconds     [place] Draw [[id number]] Collected:  01/1940    Specimen:  Blood Updated:  01/31/20 2045    Narrative:       The following orders were created for panel order [place] Draw.  Procedure                               Abnormality         Status                     ---------                               -----------         ------                     Light Blue Top[[id number]]                                                               Green Top (Gel)[[id number]]                                                             Lavender Top[[id number]]                                     Final result               Gold Top - SST[[id number]]                                   Final result                 Please view results for these tests on the individual orders.    Lavender Top [[id number]] Collected:  01/1940    Specimen:  Blood Updated:  01/31/20 2045     Extra Tube hold for add-on     Comment: Auto resulted       Gold Top - SST [[id number]] Collected:  01/1940    Specimen:  Blood Updated:  01/31/20 2045     Extra Tube Hold for add-ons.     Comment: Auto resulted.       [place] Draw [[id number]] Collected:  01/1940    Specimen:  Blood Updated:  01/31/20 2045    Narrative:       The following orders were created for panel order [place] Draw.  Procedure                               Abnormality         Status                     ---------                               -----------         ------                     Light Blue Top[[id number]]                                   Final result               Green Top (Gel)[[id number]]                                  Final result               Lavender Top[[id number]]                                                                Gold Top - SST[[id number]]                                                                Please view results for these tests on the individual orders.    Light Blue Top [[id number]] Collected:  01/1940    Specimen:  Blood Updated:  01/31/20 2045     Extra Tube hold for add-on     Comment: Auto resulted       Green Top (Gel) [[id number]] Collected:  01/1940    Specimen:  Blood Updated:  01/31/20 2045     Extra Tube Hold for add-ons.     Comment: Auto resulted.       BNP [[id number]]  (Abnormal) Collected:  01/1940    Specimen:  Blood Updated:  01/31/20 2027     proBNP 66,299.0 pg/mL     Narrative:       Among patients with  dyspnea, NT-proBNP is highly sensitive for the detection of acute congestive heart failure. In addition NT-proBNP of <300 pg/ml effectively rules out acute congestive heart failure with 99% negative predictive value.    Results may be falsely decreased if patient taking Biotin.      POC Lactate [[id number]]  (Abnormal) Collected:  01/31/20 2004    Specimen:  Blood Updated:  01/31/20 2022     Lactate 2.9 mmol/L      Comment: Serial Number: [id number]:  [id number]       Troponin [[id number]]  (Abnormal) Collected:  01/1940    Specimen:  Blood Updated:  01/31/20 2022     Troponin T 0.253 ng/mL     Narrative:       Troponin T Reference Range:  <= 0.03 ng/mL-   Negative for AMI  >0.03 ng/mL-     Abnormal for myocardial necrosis.  Clinicians would have to utilize clinical acumen, EKG, Troponin and serial changes to determine if it is an Acute Myocardial Infarction or myocardial injury due to an underlying chronic condition.       Results may be falsely decreased if patient taking Biotin.      Comprehensive Metabolic Panel [[id number]]  (Abnormal) Collected:  01/1940    Specimen:  Blood Updated:  01/31/20 2020     Glucose 118 mg/dL      BUN 77 mg/dL      Creatinine 4.06 mg/dL      Sodium 133 mmol/L      Potassium 5.0 mmol/L      Chloride 91 mmol/L      CO2 23.0 mmol/L      Calcium 10.7 mg/dL      Total Protein 7.1 g/dL      Albumin 4.00 g/dL      ALT (SGPT) 49 U/L      AST (SGOT) 51 U/L      Comment: Specimen hemolyzed.  Results may be affected.        Alkaline Phosphatase 133 U/L      Total Bilirubin 1.8 mg/dL      eGFR Non African Amer 14 mL/min/1.73      Comment: <15 Indicative of kidney failure.        eGFR   Amer --     Comment: <15 Indicative of kidney failure.        Globulin 3.1 gm/dL      A/G Ratio 1.3 g/dL      BUN/Creatinine Ratio 19.0     Anion Gap 19.0 mmol/L     Narrative:       GFR Normal >60  Chronic Kidney Disease <60  Kidney Failure <15      Lactic Acid, Plasma [[id number]]  (Abnormal)  Collected:  01/1940    Specimen:  Blood Updated:  01/31/20 2020     Lactate 4.2 mmol/L     Blood Gas, Arterial [[id number]]  (Abnormal) Collected:  01/31/20 2004    Specimen:  Arterial Blood Updated:  01/31/20 2008     Site Left Brachial     Binh's Test N/A     pH, Arterial 7.453 pH units      pCO2, Arterial 32.0 mm Hg      pO2, Arterial 116.1 mm Hg      HCO3, Arterial 22.4 mmol/L      Base Excess, Arterial -0.7 mmol/L      Comment: Serial Number: [id number]:  [id number]        O2 Saturation, Arterial 98.8 %      CO2 Content 23.4 mmol/L      Barometric Pressure for Blood Gas --     Comment: N/A        Modality Cannula     FIO2 44 %      Hemodilution No    Protime-INR [[id number]]  (Abnormal) Collected:  01/1940    Specimen:  Blood Updated:  01/31/20 1952     Protime 14.3 Seconds      INR 1.44    CBC & Differential [[id number]] Collected:  01/1940    Specimen:  Blood Updated:  01/31/20 1949    Narrative:       The following orders were created for panel order CBC & Differential.  Procedure                               Abnormality         Status                     ---------                               -----------         ------                     CBC Auto Differential[[id number]]        Abnormal            Final result                 Please view results for these tests on the individual orders.    CBC Auto Differential [[id number]]  (Abnormal) Collected:  01/1940    Specimen:  Blood Updated:  01/31/20 1949     WBC 9.60 10*3/mm3      RBC 4.57 10*6/mm3      Hemoglobin 16.1 g/dL      Hematocrit 48.9 %      .1 fL      MCH 35.3 pg      MCHC 32.9 g/dL      RDW 15.8 %      RDW-SD 59.5 fl      MPV 10.7 fL      Platelets 111 10*3/mm3      Neutrophil % 84.3 %      Lymphocyte % 6.6 %      Monocyte % 8.2 %      Eosinophil % 0.2 %      Basophil % 0.7 %      Neutrophils, Absolute 8.10 10*3/mm3      Lymphocytes, Absolute 0.60 10*3/mm3      Monocytes, Absolute 0.80 10*3/mm3      Eosinophils, Absolute  0.00 10*3/mm3      Basophils, Absolute 0.10 10*3/mm3      nRBC 2.0 /100 WBC            I reviewed the patient's new clinical results.    Assessment/Plan      Angina Pectoris with elevated troponin  Chronic systolic failure LVEF of 20%, compensated congestive heart failure   Acute Kidney Failure/Acute kidney injury/chronic kidney disease Stage 3  Volume Depletion  Elevated lactic acid with Lactic Acidosis  Ischemic cardiomyopathy.  Atrial fibrillation with rapid ventricular response due to  valvular disease  Essential hypertension/ Hypotensive   Mixed hyperlipidemia  COPD Panlobular  Continuous tobacco abuse  Status post single-chamber ICD 03/05/2010.    Michelle scan Cardiolite test is abnormal with inferior infarction and significant poster lateral ischemia.  Status post CABG 01/2000.    Status post stent to SVG to RCA 10/17/2018  Satus post left iliofemoral thrombectomy and endarterectomy 04/01/2017.  Status post stent to right common iliac artery and left external iliac artery 04/04/2017   Anemia of CKD stable  Primary osteoarthritis of  multiple joints  Vitamin D deficiency  Tobacco use disorder  Hypocalcemia  Defient Personality and noncomplaint    Active Problems:    Chest pain      I think the patient is dry he has not put out urine.  His urine is very dark his lactic acid is up he does not have white count elevation chest x-ray does not show any edema.  We will hold his Bumex drip till the cardiology and nephrology will examine him.  We will gently hydrate him.  Been started on Rocephin and vancomycin    I discussed the patients findings and my recommendations with patient.     Yassine Haines MD  02/01/20  8:08 AM

## 2020-02-02 NOTE — PROGRESS NOTES
"KPA/PULM/CC PROGRESS NOTE     History of present illness  The patient initially presented to the emergency department for evaluation of midsternal chest pain, cough, shortness of air, and hypotension.  He reports that he developed midsternal chest pain which \"feels like thousand needles\".  He also states he could not get his breath which led to a panic attack which led to increasing shortness of air.  He states that he has just generally felt bad for a couple of months and has had intermittent vomiting for a couple of days.  He is a very poor historian and cannot report that he has had any relieving or aggravating qualities to his symptoms.  He has also noted he states that his chest pain does not radiate into his arms, back, neck, or jaw.  He denies that he has had diarrhea, hematemesis, melena.  Reports that he has occasional dizziness.  He denies fever or ill contacts.  Of note the patient had been admitted to this facility on 1/25/2020 for evaluation of increased shortness of air.  At that time he was noted to have an elevated BNP and troponin.  Reports that he has had increasing bilateral lower extremity edema and that his diuretics had recently been increased.  He is a current, every day smoker and reports that he has had to increase his inhaler use recently.  He denies orthopnea.     The patient's wife states that he followed up with Dr. West in the office the day of admission and was found to be hypotensive.  He was given a prescription for \"a medicine to keep his blood pressure up\" however later in the day he continued to feel bad with generalized weakness and passing out and came to the emergency department for further evaluation and was admitted.  Nephrology and cardiology were consulted.  Per cardiology the patient has severe ischemic cardiomyopathy, severe left ventricular dysfunction, congestive heart failure NYHA class IV, possibly with cardiogenic shock.  Echocardiogram revealed severe left " "ventricular dysfunction and mitral regurgitation.  The patient developed a rapid heart rate which was a flutter/A. fib with RVR.  He also developed hypotension with a systolic blood pressure of 80.  Dobutamine and amiodarone were initiated and the patient moved to the intensive care unit for further evaluation and treatment.    SUBJECTIVE    2/2: Events of night noted, patient became unresponsive and hemodynamically unstable.  Now intubated and sedated.  Requiring multiple pressors for blood pressure support      OBJECTIVE    /58   Pulse 106   Temp 97.3 °F (36.3 °C)   Resp 20   Ht 180.3 cm (71\")   Wt 68.3 kg (150 lb 9.2 oz)   SpO2 100%   BMI 21.00 kg/m²   Intake/Output last 3 shifts:  I/O last 3 completed shifts:  In: 1400 [I.V.:1400]  Out: 400 [Urine:400]  Intake/Output this shift:  No intake/output data recorded.    Constitutional:  Well developed, well nourished, intubated and sedated, chronically ill-appearing  Eyes:  PERRL, conjunctiva normal   HENT:  Atraumatic, external ears normal, nose normal, oral ET tube.  Right nare NG tube  Neck-trachea midline.  No JVD.  Right IJ central line  Respiratory:   Breath sounds coarse but clear, no rales, no wheezing   Cardiovascular: A. fib with RVR, systolic ejection murmur, no gallops, no rubs   GI:  Soft, nondistended, normal bowel sounds   : Deferred  Musculoskeletal:  No edema, no clubbing, no deformities.  Fingertips slightly dusky  Integument: Poor turgor, no rash   Neurologic:  Intubated and sedated  Psychiatric:   Intubated and sedated     Scheduled Meds:  Acetylcysteine 1,200 mg Oral Q12H   albumin human 25 g Intravenous Once   azithromycin 500 mg Intravenous Q24H   budesonide 0.5 mg Nebulization BID - RT   cefTRIAXone 1 g Intravenous Q24H   chlorhexidine 15 mL Mouth/Throat Q12H   ipratropium-albuterol 3 mL Nebulization 4x Daily - RT   midodrine 10 mg Oral Q8H       Continuous Infusions:  amiodarone 0.5 mg/min Last Rate: 0.5 mg/min (02/02/20 0538) "   dexmedetomidine 0.2-1.5 mcg/kg/hr Last Rate: 0.3 mcg/kg/hr (02/02/20 0424)   DOBUTamine 2.5 mcg/kg/min Last Rate: 2.5 mcg/kg/min (02/02/20 0512)   heparin 12 Units/kg/hr Last Rate: 11 Units/kg/hr (02/01/20 1839)   norepinephrine 0.02-0.5 mcg/kg/min Last Rate: 0.125 mcg/kg/min (02/02/20 0950)   phenylephrine 0.5-6 mcg/kg/min Last Rate: 6 mcg/kg/min (02/02/20 1039)   propofol 5-50 mcg/kg/min Last Rate: Stopped (02/02/20 0946)   vasopressin 0.01-0.03 Units/min Last Rate: 0.03 Units/min (02/02/20 0859)       PRN Meds:fentanyl  •  heparin  •  heparin  •  ipratropium-albuterol  •  midazolam  •  ondansetron  •  sodium chloride  •  sodium chloride     Data Review:  Lab Results (last 24 hours)     Procedure Component Value Units Date/Time    POC Glucose Once [[id number]]  (Abnormal) Collected:  02/02/20 1151    Specimen:  Blood Updated:  02/02/20 1158     Glucose 111 mg/dL      Comment: Serial Number: [id number]:  [id number]       [place] Draw [[id number]] Collected:  01/1940    Specimen:  Blood Updated:  02/02/20 0730    Narrative:       The following orders were created for panel order [place] [facility].  Procedure                               Abnormality         Status                     ---------                               -----------         ------                     Light Blue Top[[id number]]                                   Final result               Green Top (Gel)[[id number]]                                                             Lavender Top[[id number]]                                     Final result               Gold Top - SST[[id number]]                                   Final result                 Please view results for these tests on the individual orders.    Light Blue Top [[id number]] Collected:  02/02/20 0623    Specimen:  Blood Updated:  02/02/20 0730     Extra Tube hold for add-on     Comment: Auto resulted       aPTT [[id number]]  (Abnormal) Collected:  02/02/20 0623    Specimen:  Blood  Updated:  02/02/20 0730     PTT 56.7 seconds     Protime-INR [[id number]]  (Abnormal) Collected:  02/02/20 0623    Specimen:  Blood Updated:  02/02/20 0730     Protime 17.3 Seconds      INR 1.79    Respiratory Culture - Sputum, ET Suction [[id number]] Collected:  02/02/20 0458    Specimen:  Sputum from ET Suction Updated:  02/02/20 0713     Gram Stain Rare (1+) Epithelial cells per low power field      Many (4+) WBCs per low power field      Rare (1+) Mixed bacterial morphotypes seen on Gram Stain    Lactic Acid, Plasma [[id number]]  (Abnormal) Collected:  02/02/20 0623    Specimen:  Blood Updated:  02/02/20 0656     Lactate 4.8 mmol/L     Vancomycin, Random [[id number]]  (Normal) Collected:  02/02/20 0623    Specimen:  Blood Updated:  02/02/20 0655     Vancomycin Random 9.20 mcg/mL     Phosphorus [[id number]]  (Abnormal) Collected:  02/02/20 0003    Specimen:  Blood Updated:  02/02/20 0514     Phosphorus 7.1 mg/dL      Comment: Result checked        Magnesium [[id number]]  (Abnormal) Collected:  02/02/20 0003    Specimen:  Blood Updated:  02/02/20 0507     Magnesium 2.9 mg/dL     Blood Gas, Arterial [[id number]]  (Abnormal) Collected:  02/02/20 0459    Specimen:  Arterial Blood Updated:  02/02/20 0501     Site Arterial Line     Binh's Test N/A     pH, Arterial 7.310 pH units      pCO2, Arterial 41.6 mm Hg      pO2, Arterial 350.4 mm Hg      HCO3, Arterial 21.0 mmol/L      Base Excess, Arterial -5.1 mmol/L      Comment: Serial Number: [id number]:  [id number]        O2 Saturation, Arterial 99.9 %      CO2 Content 22.2 mmol/L      Barometric Pressure for Blood Gas --     Comment: N/A        Modality Adult Vent     FIO2 100 %      Ventilator Mode ;AC     Set Tidal Volume 500     PEEP 5     Hemodilution No     Respiratory Rate 20    Comprehensive Metabolic Panel [[id number]]  (Abnormal) Collected:  02/02/20 0339    Specimen:  Blood Updated:  02/02/20 0418     Glucose 177 mg/dL      BUN 87 mg/dL      Creatinine 4.55 mg/dL       Sodium 131 mmol/L      Potassium 5.5 mmol/L      Chloride 93 mmol/L      CO2 15.0 mmol/L      Comment: Result checked         Calcium 8.4 mg/dL      Total Protein 5.6 g/dL      Albumin 3.20 g/dL      ALT (SGPT) 166 U/L      AST (SGOT) 174 U/L      Alkaline Phosphatase 113 U/L      Total Bilirubin 1.8 mg/dL      eGFR Non African Amer 12 mL/min/1.73      Comment: <15 Indicative of kidney failure.        eGFR   Amer --     Comment: <15 Indicative of kidney failure.        Globulin 2.4 gm/dL      A/G Ratio 1.3 g/dL      BUN/Creatinine Ratio 19.1     Anion Gap 23.0 mmol/L     Narrative:       GFR Normal >60  Chronic Kidney Disease <60  Kidney Failure <15      CBC & Differential [[id number]] Collected:  02/02/20 0338    Specimen:  Blood Updated:  02/02/20 0417    Narrative:       The following orders were created for panel order CBC & Differential.  Procedure                               Abnormality         Status                     ---------                               -----------         ------                     CBC Auto Differential[[id number]]        Abnormal            Final result                 Please view results for these tests on the individual orders.    CBC Auto Differential [[id number]]  (Abnormal) Collected:  02/02/20 0338    Specimen:  Blood Updated:  02/02/20 0417     WBC 11.80 10*3/mm3      RBC 4.10 10*6/mm3      Hemoglobin 14.7 g/dL      Hematocrit 43.8 %      .7 fL      MCH 35.8 pg      MCHC 33.5 g/dL      RDW 15.0 %      RDW-SD 55.6 fl      MPV 12.2 fL      Platelets 94 10*3/mm3     Scan Slide [[id number]] Collected:  02/02/20 0338    Specimen:  Blood Updated:  02/02/20 0417     Scan Slide --     Comment: See Manual Differential Results       Manual Differential [[id number]]  (Abnormal) Collected:  02/02/20 0338    Specimen:  Blood Updated:  02/02/20 0417     Neutrophil % 72.0 %      Lymphocyte % 9.0 %      Monocyte % 7.0 %      Basophil % 1.0 %      Bands %  11.0 %      Neutrophils  Absolute 9.79 10*3/mm3      Lymphocytes Absolute 1.06 10*3/mm3      Monocytes Absolute 0.83 10*3/mm3      Basophils Absolute 0.12 10*3/mm3      nRBC 2.0 /100 WBC      Anisocytosis Slight/1+     Polychromasia Slight/1+     Toxic Granulation Slight/1+     Vacuolated Neutrophils Slight/1+     Giant Platelets Slight/1+    BNP [[id number]]  (Abnormal) Collected:  02/02/20 0339    Specimen:  Blood Updated:  02/02/20 0416     proBNP 51,192.0 pg/mL     Narrative:       Among patients with dyspnea, NT-proBNP is highly sensitive for the detection of acute congestive heart failure. In addition NT-proBNP of <300 pg/ml effectively rules out acute congestive heart failure with 99% negative predictive value.    Results may be falsely decreased if patient taking Biotin.      Troponin [[id number]]  (Abnormal) Collected:  02/02/20 0339    Specimen:  Blood Updated:  02/02/20 0412     Troponin T 0.363 ng/mL     Narrative:       Troponin T Reference Range:  <= 0.03 ng/mL-   Negative for AMI  >0.03 ng/mL-     Abnormal for myocardial necrosis.  Clinicians would have to utilize clinical acumen, EKG, Troponin and serial changes to determine if it is an Acute Myocardial Infarction or myocardial injury due to an underlying chronic condition.       Results may be falsely decreased if patient taking Biotin.      Magnesium [[id number]]  (Abnormal) Collected:  02/02/20 0339    Specimen:  Blood Updated:  02/02/20 0410     Magnesium 2.9 mg/dL     aPTT [[id number]]  (Abnormal) Collected:  02/02/20 0338    Specimen:  Blood Updated:  02/02/20 0354     PTT 59.1 seconds     POC Lactate [[id number]]  (Abnormal) Collected:  02/02/20 0330    Specimen:  Blood Updated:  02/02/20 0335     Lactate 7.5 mmol/L      Comment: Serial Number: [id number]:  [id number]       POCT Electrolytes +HGB +HCT [[id number]]  (Abnormal) Collected:  02/02/20 0330    Specimen:  Blood Updated:  02/02/20 0335     Sodium 127 mmol/L      POC Potassium 5.0 mmol/L      Ionized Calcium 0.83  mmol/L      Comment: Serial Number: [id number]:  [id number]        Glucose 180 mg/dL      Hematocrit 47 %      Hemoglobin 15.9 g/dL     Blood Gas, Arterial [[id number]]  (Abnormal) Collected:  02/02/20 0330    Specimen:  Arterial Blood Updated:  02/02/20 0333     Site Arterial Line     Binh's Test N/A     pH, Arterial 7.278 pH units      pCO2, Arterial 34.1 mm Hg      pO2, Arterial 75.3 mm Hg      HCO3, Arterial 16.0 mmol/L      Base Excess, Arterial -9.8 mmol/L      Comment: Serial Number: [id number]:  [id number]        O2 Saturation, Arterial 93.2 %      CO2 Content 17.0 mmol/L      Barometric Pressure for Blood Gas --     Comment: N/A        Modality Bagging     FIO2 100 %      Hemodilution No    POC Glucose Once [[id number]]  (Abnormal) Collected:  02/02/20 0316    Specimen:  Blood Updated:  02/02/20 0317     Glucose 163 mg/dL      Comment: Serial Number: [id number]:  [id number]       Lactic Acid, Plasma [[id number]]  (Abnormal) Collected:  02/02/20 0003    Specimen:  Blood Updated:  02/02/20 0045     Lactate 2.6 mmol/L     Troponin [[id number]]  (Abnormal) Collected:  02/02/20 0003    Specimen:  Blood Updated:  02/02/20 0045     Troponin T 0.316 ng/mL     Narrative:       Troponin T Reference Range:  <= 0.03 ng/mL-   Negative for AMI  >0.03 ng/mL-     Abnormal for myocardial necrosis.  Clinicians would have to utilize clinical acumen, EKG, Troponin and serial changes to determine if it is an Acute Myocardial Infarction or myocardial injury due to an underlying chronic condition.       Results may be falsely decreased if patient taking Biotin.      Basic Metabolic Panel [[id number]]  (Abnormal) Collected:  02/02/20 0003    Specimen:  Blood Updated:  02/02/20 0036     Glucose 156 mg/dL      BUN 92 mg/dL      Creatinine 4.73 mg/dL      Sodium 131 mmol/L      Potassium 4.9 mmol/L      Chloride 92 mmol/L      CO2 21.0 mmol/L      Calcium 8.7 mg/dL      eGFR   Amer --     Comment: <15 Indicative of kidney  "failure.        eGFR Non African Amer 12 mL/min/1.73      Comment: <15 Indicative of kidney failure.        BUN/Creatinine Ratio 19.5     Anion Gap 18.0 mmol/L     Narrative:       GFR Normal >60  Chronic Kidney Disease <60  Kidney Failure <15      aPTT [[id number]]  (Normal) Collected:  02/02/20 0003    Specimen:  Blood Updated:  02/02/20 0023     PTT 74.6 seconds     Procalcitonin [[id number]]  (Abnormal) Collected:  02/01/20 1653    Specimen:  Blood Updated:  02/01/20 2005     Procalcitonin 0.47 ng/mL     Narrative:       As a Marker for Sepsis (Non-Neonates):   1. <0.5 ng/mL represents a low risk of severe sepsis and/or septic shock.  1. >2 ng/mL represents a high risk of severe sepsis and/or septic shock.    As a Marker for Lower Respiratory Tract Infections that require antibiotic therapy:  PCT on Admission     Antibiotic Therapy             6-12 Hrs later  > 0.5                Strongly Recommended            >0.25 - <0.5         Recommended  0.1 - 0.25           Discouraged                   Remeasure/reassess PCT  <0.1                 Strongly Discouraged          Remeasure/reassess PCT      As 28 day mortality risk marker: \"Change in Procalcitonin Result\" (> 80 % or <=80 %) if Day 0 (or Day 1) and Day 4 values are available. Refer to http://[email or web address]-pct-calculator.com/   Change in PCT <=80 %   A decrease of PCT levels below or equal to 80 % defines a positive change in PCT test result representing a higher risk for 28-day all-cause mortality of patients diagnosed with severe sepsis or septic shock.  Change in PCT > 80 %   A decrease of PCT levels of more than 80 % defines a negative change in PCT result representing a lower risk for 28-day all-cause mortality of patients diagnosed with severe sepsis or septic shock.                Results may be falsely decreased if patient taking Biotin.     Blood Culture - Blood, Arm, Right [[id number]] Collected:  01/31/20 1949    Specimen:  Blood from Arm, Right Updated: "  02/01/20 2000     Blood Culture No growth at 24 hours    Blood Culture - Blood, Arm, Left [[id number]] Collected:  01/1940    Specimen:  Blood from Arm, Left Updated:  02/01/20 1945     Blood Culture No growth at 24 hours    aPTT [[id number]]  (Normal) Collected:  02/01/20 1856    Specimen:  Blood Updated:  02/01/20 1937     PTT 73.3 seconds     MRSA Screen, PCR - Swab, Nares [[id number]]  (Normal) Collected:  02/01/20 1652    Specimen:  Swab from Nares Updated:  02/01/20 1916     MRSA PCR No MRSA Detected    Blood Gas, Arterial [[id number]]  (Abnormal) Collected:  02/01/20 1814    Specimen:  Arterial Blood Updated:  02/01/20 1817     Site Arterial Line     Binh's Test N/A     pH, Arterial 7.446 pH units      pCO2, Arterial 31.1 mm Hg      pO2, Arterial 99.9 mm Hg      HCO3, Arterial 21.5 mmol/L      Base Excess, Arterial -1.6 mmol/L      Comment: Serial Number: [id number]:  [id number]        O2 Saturation, Arterial 98.1 %      CO2 Content 22.4 mmol/L      Barometric Pressure for Blood Gas --     Comment: N/A        Modality Cannula     FIO2 40 %      Hemodilution No    Hepatic Function Panel [[id number]]  (Abnormal) Collected:  02/01/20 1653    Specimen:  Blood Updated:  02/01/20 1731     Total Protein 6.2 g/dL      Albumin 3.30 g/dL      ALT (SGPT) 103 U/L      AST (SGOT) 103 U/L      Comment: Specimen hemolyzed.  Results may be affected.        Alkaline Phosphatase 124 U/L      Total Bilirubin 2.1 mg/dL      Bilirubin, Direct 0.9 mg/dL      Comment: Specimen hemolyzed. Results may be affected.  Result checked         Bilirubin, Indirect 1.2 mg/dL     POC Glucose Once [[id number]]  (Abnormal) Collected:  02/01/20 1620    Specimen:  Blood Updated:  02/01/20 1623     Glucose 138 mg/dL      Comment: Serial Number: [id number]:  [id number]       Basic Metabolic Panel [[id number]]  (Abnormal) Collected:  02/01/20 1404    Specimen:  Blood Updated:  02/01/20 1440     Glucose 167 mg/dL      BUN 93 mg/dL       Creatinine 4.72 mg/dL      Sodium 133 mmol/L      Potassium 5.6 mmol/L      Chloride 92 mmol/L      CO2 22.0 mmol/L      Calcium 9.3 mg/dL      eGFR   Amer --     Comment: <15 Indicative of kidney failure.        eGFR Non African Amer 12 mL/min/1.73      Comment: <15 Indicative of kidney failure.        BUN/Creatinine Ratio 19.7     Anion Gap 19.0 mmol/L     Narrative:       GFR Normal >60  Chronic Kidney Disease <60  Kidney Failure <15      aPTT [[id number]]  (Abnormal) Collected:  02/01/20 1404    Specimen:  Blood Updated:  02/01/20 1427     PTT 59.6 seconds      Comment: Result checked        Uric Acid [[id number]]  (Abnormal) Collected:  02/01/20 0227    Specimen:  Blood Updated:  02/01/20 1300     Uric Acid 12.5 mg/dL              Imaging:  Imaging Results (Last 72 Hours)     Procedure Component Value Units Date/Time    XR Abdomen KUB [[id number]] Collected:  02/02/20 0923     Updated:  02/02/20 0926    Narrative:       DATE OF EXAM:  2/2/2020 3:55 AM     PROCEDURE:  XR ABDOMEN KUB-     INDICATIONS:  NG tube placement     COMPARISON:  No Comparisons Available     TECHNIQUE:   Single radiographic view of the abdomen was obtained.        FINDINGS:  Enteric tube terminates in the mid upper abdomen, likely in the distal  gastric body. Visualized bowel gas pattern appears grossly unremarkable  without definite bowel dilatation. No definite ectopic bowel gas is  seen. There is right convex curvature of the lumbar spine with suspected  advanced degenerative changes.        Impression:       1.Enteric tube appears to terminate in the mid upper abdomen, likely in  the distal gastric body.  2.Bowel gas pattern appears grossly unremarkable.     Electronically Signed By-DR. Jian Santiago MD On:2/2/2020 9:24 AM  This report was finalized on [id number] by DR. Jian Santiago MD.    XR Chest 1 View [[id number]] Collected:  02/02/20 0921     Updated:  02/02/20 0925    Narrative:       DATE OF EXAM:  2/2/2020 3:55 AM      PROCEDURE:  XR CHEST 1 VW-     INDICATIONS:  Intubation, chest pain, shortness of air     COMPARISON:  02/01/2020     TECHNIQUE:   Single radiographic view of the chest was obtained.     FINDINGS:  Endotracheal tube tip terminates at the level of the superior arch,  estimated to be approximately 4 cm above the mckinley.     Enteric tube extends into the upper abdomen, tip beyond the margins of  this exam.  Right IJ catheter terminates in the region of the superior  right atrium, as before. No definite pneumothorax is seen.  The heart is  enlarged. Patient is status post median sternotomy and CABG. ICD is  present. There is atherosclerosis of the aorta. There is left basilar  opacity with possible small left effusion, as before. No definite new  infiltrate or significant right effusion.       Impression:       1.Endotracheal tube tip is approximately 4 cm above the mckinley.  2.Enteric tube appears to extend into the upper abdomen, tip beyond the  margins of this exam.  3.Cardiomegaly with left effusion and left basilar opacities, as before.     Electronically Signed By-DR. Jian Santiago MD On:2/2/2020 9:23 AM  This report was finalized on [id number] by DR. Jian Santiago MD.    XR Chest 1 View [[id number]] Collected:  02/01/20 1840     Updated:  02/01/20 1842    Narrative:       Examination: XR CHEST 1 VW-     Date of Exam: 2/1/2020 6:09 PM     Indication: line placement; R07.9-Chest pain, unspecified;  R06.00-Dyspnea, unspecified; I50.9-Heart failure, unspecified;  N17.9-Acute kidney failure, unspecified.     Comparison: 01/31/2020.     Technique: Single radiographic view of the chest was obtained.     Findings:  There is a new right internal jugular central venous catheter  terminating at the cavoatrial junction. There is moderate cardiomegaly.  There is a stable small left pleural effusion with left basilar airspace  disease/atelectasis. Stable left-sided ICD and evidence of prior median  sternotomy and CABG. The  right lung remains clear. No evidence of  pneumothorax. No acute osseous abnormalities.       Impression:       New right internal jugular central venous catheter terminates at the  cavoatrial junction. The remainder of the examination is unchanged.     Electronically Signed By-Jim Jacobs On:2/1/2020 6:40 PM  This report was finalized on [id number] by  Jim Jacobs, .    XR Chest 1 View [[id number]] Collected:  01/31/20 2014     Updated:  01/31/20 2030    Narrative:       DATE OF EXAM:  1/31/2020 8:08 PM     PROCEDURE:  XR CHEST 1 VW-     INDICATIONS:  Chest pain, tobacco abuse, shortness of breath, heart disease.      COMPARISON:  01/23/2020.     TECHNIQUE:   Single radiographic view of the chest was obtained.     FINDINGS:  The heart is enlarged. There are postsurgical changes apparent which  includes a left-sided transvenous pacemaker/intracardiac defibrillator.  There is abnormal medial left basilar consolidation suspicious for  chronic scarring or atelectasis. The patient has a large hiatal hernia.  The right lung is clear. There are no pleural effusions.  There are  chronic age-related changes involving the bony thorax and thoracic  aorta.       Impression:          1. Cardiomegaly.  2. Abnormal medial left basilar consolidation suspicious for either  chronic scarring or atelectasis. There is also a hiatal hernia.     Electronically Signed By-Adrian Morris On:1/31/2020 8:27 PM  This report was finalized on [id number] by  Adrian Morris, .          ASSESSMENT    Hypotension, etiology possibly cardiogenic shock  -Dobutamine initiated  -Now requiring multi pressor support with Levophed, digna-, vaso-  -Blood cultures pending  -Lactate 3.3     Acute hypoxemic respiratory failure  -Etiology likely fluid volume overload secondary to cardiogenic shock  -Intubated for mechanical ventilation support.  Not a weaning candidate due to hemodynamic instability  -Respiratory viral panel negative  -Procalcitonin  0.47  -Titrate O2 as tolerated to maintain a saturation of 90-91%  -Empiric antibiotics with ceftriaxone and Zithromax     Severe ischemic cardiomyopathy, EF 20% by echocardiogram 1/24/2020  -Cardiology following closely     CHF  -Does not appear grossly volume overloaded     A. fib/a flutter with RVR  -Amiodarone drip per cardiology  -Heparin drip     COPD, not in acute exacerbation  -Duo nebs scheduled  -Pulmicort nebs     Chronic kidney disease stage III  -Nephrology following closely     Coronary artery disease with history of CABG and stents  -Cardiology following  -Restart home medications when clinically appropriate     History of hypertension  -Antihypertensives on hold due to hypotension.  Restart when clinically appropriate     AICD in situ     Tobacco abuse 1 to 2 packs/day x 70 years  -Recommended cessation     PUD prophylaxis with Protonix  DVT prophylaxis with heparin drip     Right radial A-line 2/1/2020  Right IJ central line 2/1/2020        PLAN    See orders. The plan was discussed with the patient's family at length.  They would like for the patient to be a DNR at this time.  We discussed that it would be reasonable to continue mechanical ventilation support for 7 days if the patient is unable to be extubated by that time goals of care will be readdressed.        I thank you for this opportunity to take part in this patient's care and will follow the patient along with you.     Attending physician statement:  Patient remains critically ill.  Total critical care time spent is 32 minutes which does not include any time for procedures.  Critical care time is exclusive of time spent by the nurse practitioner.  Above note scribed by nurse practitioner for me and later reviewed/modified by me for accuracy . I've examined the patient and reviewed all labs and images.  I have directly participated in the evaluation and management of this patient.  Devon Ariza MD  Pulmonary and [facility]  [facility]

## 2020-02-02 NOTE — PROCEDURES
"Intubation  Date/Time: 2/2/2020 3:45 AM  Performed by: Abhishek Akers APRN  Authorized by: Abhishek Akers APRN   Consent: The procedure was performed in an emergent situation.  Patient states understanding of procedure being performed: Emergent situation, patient unresponsive.  Patient's family called by nursing staff and updated.  Imaging studies: imaging studies available  Required items: required blood products, implants, devices, and special equipment available  Patient identity confirmed: arm band, hospital-assigned identification number and anonymous protocol, patient vented/unresponsive  Time out: Immediately prior to procedure a \"time out\" was called to verify the correct patient, procedure, equipment, support staff and site/side marked as required.  Indications: airway protection and  respiratory failure  Intubation method: video-assisted (Glidoscope used.)  Patient status: paralyzed (RSI)  Preoxygenation: BVM  Pretreatment medications: none  Sedatives: etomidate, fentanyl and see MAR for details  Paralytic: none  Laryngoscope size: Mac 3  Tube size: 7.5 mm  Tube type: cuffed  Number of attempts: 1  Cricoid pressure: no  Cords visualized: yes  Post-procedure assessment: CO2 detector  Breath sounds: equal  Cuff inflated: yes  ETT to lip: 22 cm  Tube secured with: ETT pritchett  Chest x-ray interpreted by me.  Chest x-ray findings: endotracheal tube in appropriate position  Patient tolerance: Patient tolerated the procedure well with no immediate complications  Comments: Initial delay in intubation due to patient's acute change in hemodynamics, patient was minimally responsive, but responsive enough that it was preferable to give medications.  ABG was checked, and patient's oxygen was responding well to BVM ventilation.  Vasopressors titrated to effect, and once BP appropriate, proceeded with intubation.        JAME Ag  2/2/2020  "

## 2020-02-02 NOTE — NURSING NOTE
Pt doing better now. Sedated and on vent. Fio2 down to 50%. Weaning vasopressers very slowly. Family is at bedside. Will continue to monitor.

## 2020-02-02 NOTE — CONSULTS
Nutrition Services    Patient Name:  Luis E Rahman  YOB: 1940  MRN: [id number]  Admit Date:  1/31/2020    Comments: Ordering Novasource Renal to initial goal 20 mL/hr with 10 mL/hr flush. Will continue to monitor EN tolerance, labs, and advance tube feed towards end goal rate as able.      Reason for Assessment                Reason for Assessment    Reason For Assessment Tube Feed Assessment (2/2)       Diagnosis H&P: Recent ER visit related to SOA and chest pain and was discharged home with plan to follow Dr. Ford closely, Afib, CHF, HLD, HTN, MI, COPD, CKD, CAD s/p CABG and stents    Current Problems: admit related to chest pain, cough, and SOA, also noted recent poor appetite and po intakes, continues to smoke    Of note: Pt became unresponsive and hemodynamically unstable during the night between 2/1-2/2, he is now s/p intubated and sedated and requiring multiple pressors for blood pressure support    -hypotension, etiology possibly cardiogenic shock, requiring multiple pressors    -acute respiratory failure: etiology likely fluid volume overload 2* cardiogenic shock, currently intubated    -severe ischemic cardiomyopathy: cardiology following    -CHF    -A Fib with RVR: heparin drip    -Tobacco Abuse    -MASOUD on CKD 4 - nephrology following         Nutrition/Diet History                Nutrition/Diet History    Food/Fluid Intake Narrative   2/2: Pt intubated at time of visit, multiple family members/visitors in room during visit, reported patient normally only eats about one meal per day (this has been his normal for a while), however states recently he has not eaten much or at all, states for the past 2-3 he would throw up anything he ate, report noticeable weight loss (see below)       Functional Status Reports patient lives at home by himself however does not like to cook, family members bring him frozen dinners and pt use to not use microwave (reports it was related to his pacemaker)        Food Allergies NKFA per EMR       Factors Affecting Nutritional Intake  Comprised Airway         Anthropometrics             Anthropometrics    Height 71”    Weight 150 lb (2/2/20)       Admit Weight    Admit Weight 162 lb (1/31/20) - likely incorrect  149 lb (1/30/20) - bed noted to be zeroed       Ideal Body Weight (IBW)    Ideal Body Weight (IBW) 172 lb    % Ideal Body Weight 87%       Usual Body Weight (UBW)    Usual Body Weight ~165 lb per family members    % Usual Body Weight 91%    Weight Hx  167 lb (1/7/20) - 10% weight loss = severe, however ? Accuracy of weight related to previous weight history  148 lb (9/19/19)  152 lb (9/3/19)  171 lb (2/5/19) - 12% weight loss       Body Mass Index (BMI)    BMI (kg/m2) 20.92 kg/m2           Labs/Medications                Labs    Pertinent Lab Results Comments  Gluc 177/111, Na 131 L, K 5.5 H, BUN 87 H, Crt 4.55 H, Ca 8.4 L, Alk Phos 113,  H,  H, Alb 3.2 L, Lactate 3.8 H, Ph 7.1 H, Mg 2.9 H, Hgb 14.7, Hct 43.8        Medications    Pertinent Medications Comments Rocephrin, Peridex, Proamatime, Amiodarone    Pressors: Vasopressin, phenylephrine, levophed, dobutamin - Spoke with DARIO Mayberry who reports patient is on three pressors, maxed out one and receiving minimal of another, has titrated slightly down on requirements today    Sedation: Diprivan (noted to be stopped 2/2, confirmed with DARIO Mayberry), Precedex         Physical Findings                Physical Findings    Overall Physical Appearance 2/2: Attempted NPFE, noted some wasting in temple/orbital region, no wasting noted in clavicle/acromion at this time however it was difficult to assess due to tube and patient somewhat leaning on one side, recommend continuing to repeat as able       Edema  +2 noted in ankles       Gastrointestinal N/V: unable to review  BM: moderate charted on 2/1       Tubes None currently documented (pt has NG tube)       Oral/Mouth Cavity Unable to chew/swallow       Skin  Coccyx - stage 1         Estimated/Assessed Needs              Calorie Requirements     Height Used for Calorie Calculations  71”     Weight Used for Calorie Calculations 68 kg / 150 lb     Formula chosen for Calorie Calculations  [facility] 2003b  Using 36.4*C and 10 ventilation L/min     Estimated Calorie Requirements (kcals/day) 1525 kcal/day (~22 kcal/kg)             Protein Requirements    Weight Used For Protein Calculations 68 kg / 150 lb      Est Protein Requirement Amount (gms/kg) 1.0 g/kg - noted CKD4 (increased creatinine) and wound      Estimated Protein Requirements (gms/day) 68 grams protein         Fluid Requirements     Estimated Fluid Requirements (mL/day) 1525 mL fluid  (Monitor hydration status)           Fluid Deficit       Desired Sodium Level (mEq/L)      Desired Sodium Level (mEq/L)      Estimated Fluid Deficit Needs (L)           Nutrition Prescription Ordered                Nutrition Prescription PO    Current PO Diet  NPO     Supplement         Nutrition Prescription EN    Enteral Route NG tube     TF modular -     TF Delivery Method -     Current Ordered TF  -     Current Ordered Water flush  -     TF Observation  -        Nutrition Prescription TPN    TPN Route -     Current Ordered TPN Volume  -     Dextrose (gm/kcals)  -     Amino Acid (gm/kcals) -     Lipids (Concentration/ML/Frequency)  -         Evaluation of Received Nutrient/Fluid Intake                PO Evaluation    % PO Intake None recorded at this time, currently NPO (0% intakes)        EN Evaluation    TF Changes To be initiated today     TF Residual -     TF Tolerance      Average EN Delivered  -        TPN Evaluation    Total Number of Days on TPN  -           Clinical Course      Clinical Nutrition Course Details  -Initiated on 2GmK diet (2/1), no NPO (2/2) day #1         Problem/Interventions:                     Nutrition Diagnoses Problem 1      Problem 1   Inadequate oral intake related to decreased ability to consume  sufficient energy needs (intubated) as evidenced by estimates of insufficient po intakes (0%/NPO)     Nutrition Diagnoses Problem 2      Problem 2            Intervention Goal                Intervention Goal    General Tolerate EN         Nutrition Intervention                Nutrition Intervention    RD/Tech Action Ordering tube feed regimen below         Nutrition Prescription                Nutrition Prescription PO      Diet  NPO     Supplement         Nutrition Prescription EN    Enteral Prescription Initial Goal: Noavsource Renal to 20 mL/hr with 10 mL/hr flush (x22 hours, allows time off for ADLs) to provide 880 kcal (~13 kcal/kg), 40 grams protein (59%), and 317 mL free fluid + 220 mL flush fluid = 537 mL total fluid    End Goal (RDN to advance): Novasource Renal to 35 mL/hr with 10 mL/hr flush (x22 hours, allows time off for ADLs) to provide 1540 kcal (101%), 70 grams protein (103%), and 554 mL free fluid + 220 mL flush fluid = 774 mL total fluid (adjust flush based on hydration status)        Nutrition Prescription TPN    TPN Prescription -         Monitor/Evaluation                Monitor/Evaluation    Monitor  EN tolerance, BMs, N/V, abd pain/distention, labs (electrolytes, BUN/Crt, glucose), wt for changes, skin integrity           Electronically signed by:  Sara Barrow RD  02/02/20 2:10 PM

## 2020-02-02 NOTE — PROGRESS NOTES
[facility]iology [facility]      Patient Care Team:  Yassine Haines MD as PCP - General  Yassine Haines MD as PCP - Family Medicine  Amalia Leal MD as PCP - Claims Attributed  Amalia Leal MD as Consulting Physician (Cardiology)  Trang West MD as Consulting Physician (Nephrology)    Cardiology assessment and plan     Respiratory failure  Severe ischemic cardiomyopathy  Severe LV dysfunction  Elevated liver enzymes  Hepatic congestion  Multiorgan failure  Congestive heart failure NYHA class IV  Cardiogenic shock  Severe hypotension  Respiratory failure secondary to pulmonary edema  Atrial fibrillation /atrial flutter with rapid ventricle response  Elevated troponin likely demand ischemia  Prior known coronary artery disease with prior coronary artery bypass surgery and prior PCI and stenting  Hypotension  Lactic acidosis  Worsening renal failure  Prior history of ICD shocks for ventricular tachycardia   -status post CABG 01/2000.    Status post stent to SVG to RCA 10/17/2018   cardiac catheterization 10/17/2018.  LV-gram not performed due to renal dysfunction.  Total LAD and RCA. .  Marginal branch has 30% disease.  Villarreal is atretic.  SVG to marginal branch is patent.  SVG to diagonal branch is patent and is filling LAD also.  SVG to PDA had 95% disease just distal to the landing site and had intervention.  History of peripheral vascular disease status post peripheral intervention  History of hypertension hyperlipidemia     Discussed with the patient and family at bedside  Overall prognosis is poor  Likely will benefit from a cardiac catheterization at some point but with ongoing lactic acidosis and her cardiogenic shock I am not sure if it is going to make a meaningful difference at this time  Recent echocardiogram with severe LV dysfunction and mitral regurgitation  Plan to admit the patient to ICU for close observation  Start patient on amiodarone for better rate control and hopefully switching  "back into sinus rhythm  Continue supportive care  Follow-up on the results of the echocardiogram that was done today  Bedside limited evaluation showed severe LV dysfunction  Continue anticoagulation therapy if there is no contraindication  Discussed with the intensivist   Further recommendations based on patient hospital course  Risk benefits and alternatives discussed with the patient and family        Chief Complaint: Patient is on vent sedated    Subjective unable to get any history from the patient    Interval History: Events from overnight noted patient will over time deteriorated became hypoxic and hypotensive requiring intubation and starting on multiple pressors    Family at bedside    History taken from: patient RN    Review of Systems:  Review of Systems   Unable to perform ROS: intubated       Objective    Vital Signs  Visit Vitals  /58   Pulse 120   Temp 97.4 °F (36.3 °C)   Resp 20   Ht 180.3 cm (71\")   Wt 68.3 kg (150 lb 9.2 oz)   SpO2 97%   BMI 21.00 kg/m²     Oxygen Therapy  SpO2: 97 %  Pulse Oximetry Type: Continuous  Device (Oxygen Therapy): ventilator  Flow (L/min): 4  Oxygen Concentration (%): 100  Flowsheet Rows      First Filed Value   Admission Height  180.3 cm (71\") Documented at 01/31/2020 1921   Admission Weight  73.5 kg (162 lb) Documented at 01/31/2020 1921        Intake & Output (last 3 days)       01/30 0701 - 01/31 0700 01/31 0701 - 02/01 0700 02/01 0701 - 02/02 0700 02/02 0701 - 02/03 0700    P.O.  0      I.V. (mL/kg)   1400 (20.5)     Total Intake(mL/kg)  0 (0) 1400 (20.5)     Urine (mL/kg/hr)  100 300 (0.2)     Total Output  100 300     Net  -100 +1100                 Lines, Drains & Airways    Active LDAs     Name:   Placement date:   Placement time:   Site:   Days:    CVC Triple Lumen 02/01/20 Right Internal jugular   02/01/20 1735    Internal jugular   less than 1    Peripheral IV 01/31/20 1929 Left Antecubital   01/31/20 1929    Antecubital   1    Peripheral IV " 01/31/20 2055 Right Antecubital   01/31/20 2055    Antecubital   1    Hi-Lo Evac ETT 7.5   --    --    Oral       Arterial Line 02/01/20 Right Radial   02/01/20 1755    Radial   less than 1                Physical Exam:  Physical Exam   Constitutional: He appears ill. He is sedated and intubated.   HENT:   Head: Normocephalic and atraumatic.   Eyes: Pupils are equal, round, and reactive to light. Conjunctivae are normal.   Neck: Normal range of motion. Neck supple. No thyromegaly present.   Cardiovascular: S1 normal, S2 normal and intact distal pulses. An irregularly irregular rhythm present. Tachycardia present. PMI is displaced.   Murmur heard.   Harsh holosystolic murmur is present with a grade of 3/6.  Pulmonary/Chest: Effort normal. He is intubated. He has decreased breath sounds in the right middle field, the right lower field and the left upper field. He has rales in the right middle field, the right lower field, the left upper field and the left middle field.   Abdominal: Soft. Bowel sounds are normal.   Musculoskeletal: He exhibits no edema.   Neurological: He is unresponsive.   Skin: Skin is warm.       Results Review:     I reviewed the patient's new clinical results.    Lab Results (last 24 hours)     Procedure Component Value Units Date/Time    [place] Draw [[id number]] Collected:  01/1940    Specimen:  Blood Updated:  02/02/20 0730    Narrative:       The following orders were created for panel order [place] Draw.  Procedure                               Abnormality         Status                     ---------                               -----------         ------                     Light Blue Top[[id number]]                                   Final result               Green Top (Gel)[[id number]]                                                             Lavender Top[[id number]]                                     Final result               Gold Top - [facility][[id number]]                                    Final result                 Please view results for these tests on the individual orders.    Light Blue Top [[id number]] Collected:  02/02/20 0623    Specimen:  Blood Updated:  02/02/20 0730     Extra Tube hold for add-on     Comment: Auto resulted       aPTT [[id number]]  (Abnormal) Collected:  02/02/20 0623    Specimen:  Blood Updated:  02/02/20 0730     PTT 56.7 seconds     Protime-INR [[id number]]  (Abnormal) Collected:  02/02/20 0623    Specimen:  Blood Updated:  02/02/20 0730     Protime 17.3 Seconds      INR 1.79    Respiratory Culture - Sputum, ET Suction [[id number]] Collected:  02/02/20 0458    Specimen:  Sputum from ET Suction Updated:  02/02/20 0713     Gram Stain Rare (1+) Epithelial cells per low power field      Many (4+) WBCs per low power field      Rare (1+) Mixed bacterial morphotypes seen on Gram Stain    Lactic Acid, Plasma [[id number]]  (Abnormal) Collected:  02/02/20 0623    Specimen:  Blood Updated:  02/02/20 0656     Lactate 4.8 mmol/L     Vancomycin, Random [[id number]]  (Normal) Collected:  02/02/20 0623    Specimen:  Blood Updated:  02/02/20 0655     Vancomycin Random 9.20 mcg/mL     Phosphorus [[id number]]  (Abnormal) Collected:  02/02/20 0003    Specimen:  Blood Updated:  02/02/20 0514     Phosphorus 7.1 mg/dL      Comment: Result checked        Magnesium [[id number]]  (Abnormal) Collected:  02/02/20 0003    Specimen:  Blood Updated:  02/02/20 0507     Magnesium 2.9 mg/dL     Blood Gas, Arterial [[id number]]  (Abnormal) Collected:  02/02/20 0459    Specimen:  Arterial Blood Updated:  02/02/20 0501     Site Arterial Line     Binh's Test N/A     pH, Arterial 7.310 pH units      pCO2, Arterial 41.6 mm Hg      pO2, Arterial 350.4 mm Hg      HCO3, Arterial 21.0 mmol/L      Base Excess, Arterial -5.1 mmol/L      Comment: Serial Number: [id number]:  [id number]        O2 Saturation, Arterial 99.9 %      CO2 Content 22.2 mmol/L      Barometric Pressure for Blood Gas --     Comment: N/A         Modality Adult Vent     FIO2 100 %      Ventilator Mode ;AC     Set Tidal Volume 500     PEEP 5     Hemodilution No     Respiratory Rate 20    Comprehensive Metabolic Panel [[id number]]  (Abnormal) Collected:  02/02/20 0339    Specimen:  Blood Updated:  02/02/20 0418     Glucose 177 mg/dL      BUN 87 mg/dL      Creatinine 4.55 mg/dL      Sodium 131 mmol/L      Potassium 5.5 mmol/L      Chloride 93 mmol/L      CO2 15.0 mmol/L      Comment: Result checked         Calcium 8.4 mg/dL      Total Protein 5.6 g/dL      Albumin 3.20 g/dL      ALT (SGPT) 166 U/L      AST (SGOT) 174 U/L      Alkaline Phosphatase 113 U/L      Total Bilirubin 1.8 mg/dL      eGFR Non African Amer 12 mL/min/1.73      Comment: <15 Indicative of kidney failure.        eGFR   Amer --     Comment: <15 Indicative of kidney failure.        Globulin 2.4 gm/dL      A/G Ratio 1.3 g/dL      BUN/Creatinine Ratio 19.1     Anion Gap 23.0 mmol/L     Narrative:       GFR Normal >60  Chronic Kidney Disease <60  Kidney Failure <15      CBC & Differential [[id number]] Collected:  02/02/20 0338    Specimen:  Blood Updated:  02/02/20 0417    Narrative:       The following orders were created for panel order CBC & Differential.  Procedure                               Abnormality         Status                     ---------                               -----------         ------                     CBC Auto Differential[[id number]]        Abnormal            Final result                 Please view results for these tests on the individual orders.    CBC Auto Differential [[id number]]  (Abnormal) Collected:  02/02/20 0338    Specimen:  Blood Updated:  02/02/20 0417     WBC 11.80 10*3/mm3      RBC 4.10 10*6/mm3      Hemoglobin 14.7 g/dL      Hematocrit 43.8 %      .7 fL      MCH 35.8 pg      MCHC 33.5 g/dL      RDW 15.0 %      RDW-SD 55.6 fl      MPV 12.2 fL      Platelets 94 10*3/mm3     Scan Slide [[id number]] Collected:  02/02/20 0338    Specimen:  Blood  Updated:  02/02/20 0417     Scan Slide --     Comment: See Manual Differential Results       Manual Differential [[id number]]  (Abnormal) Collected:  02/02/20 0338    Specimen:  Blood Updated:  02/02/20 0417     Neutrophil % 72.0 %      Lymphocyte % 9.0 %      Monocyte % 7.0 %      Basophil % 1.0 %      Bands %  11.0 %      Neutrophils Absolute 9.79 10*3/mm3      Lymphocytes Absolute 1.06 10*3/mm3      Monocytes Absolute 0.83 10*3/mm3      Basophils Absolute 0.12 10*3/mm3      nRBC 2.0 /100 WBC      Anisocytosis Slight/1+     Polychromasia Slight/1+     Toxic Granulation Slight/1+     Vacuolated Neutrophils Slight/1+     Giant Platelets Slight/1+    BNP [[id number]]  (Abnormal) Collected:  02/02/20 0339    Specimen:  Blood Updated:  02/02/20 0416     proBNP 51,192.0 pg/mL     Narrative:       Among patients with dyspnea, NT-proBNP is highly sensitive for the detection of acute congestive heart failure. In addition NT-proBNP of <300 pg/ml effectively rules out acute congestive heart failure with 99% negative predictive value.    Results may be falsely decreased if patient taking Biotin.      Troponin [[id number]]  (Abnormal) Collected:  02/02/20 0339    Specimen:  Blood Updated:  02/02/20 0412     Troponin T 0.363 ng/mL     Narrative:       Troponin T Reference Range:  <= 0.03 ng/mL-   Negative for AMI  >0.03 ng/mL-     Abnormal for myocardial necrosis.  Clinicians would have to utilize clinical acumen, EKG, Troponin and serial changes to determine if it is an Acute Myocardial Infarction or myocardial injury due to an underlying chronic condition.       Results may be falsely decreased if patient taking Biotin.      Magnesium [[id number]]  (Abnormal) Collected:  02/02/20 0339    Specimen:  Blood Updated:  02/02/20 0410     Magnesium 2.9 mg/dL     aPTT [[id number]]  (Abnormal) Collected:  02/02/20 0338    Specimen:  Blood Updated:  02/02/20 0354     PTT 59.1 seconds     POC Lactate [[id number]]  (Abnormal) Collected:   02/02/20 0330    Specimen:  Blood Updated:  02/02/20 0335     Lactate 7.5 mmol/L      Comment: Serial Number: [id number]:  [id number]       POCT Electrolytes +HGB +HCT [[id number]]  (Abnormal) Collected:  02/02/20 0330    Specimen:  Blood Updated:  02/02/20 0335     Sodium 127 mmol/L      POC Potassium 5.0 mmol/L      Ionized Calcium 0.83 mmol/L      Comment: Serial Number: [id number]:  [id number]        Glucose 180 mg/dL      Hematocrit 47 %      Hemoglobin 15.9 g/dL     Blood Gas, Arterial [[id number]]  (Abnormal) Collected:  02/02/20 0330    Specimen:  Arterial Blood Updated:  02/02/20 0333     Site Arterial Line     Binh's Test N/A     pH, Arterial 7.278 pH units      pCO2, Arterial 34.1 mm Hg      pO2, Arterial 75.3 mm Hg      HCO3, Arterial 16.0 mmol/L      Base Excess, Arterial -9.8 mmol/L      Comment: Serial Number: [id number]:  [id number]        O2 Saturation, Arterial 93.2 %      CO2 Content 17.0 mmol/L      Barometric Pressure for Blood Gas --     Comment: N/A        Modality Bagging     FIO2 100 %      Hemodilution No    POC Glucose Once [[id number]]  (Abnormal) Collected:  02/02/20 0316    Specimen:  Blood Updated:  02/02/20 0317     Glucose 163 mg/dL      Comment: Serial Number: [id number]:  [id number]       Lactic Acid, Plasma [[id number]]  (Abnormal) Collected:  02/02/20 0003    Specimen:  Blood Updated:  02/02/20 0045     Lactate 2.6 mmol/L     Troponin [[id number]]  (Abnormal) Collected:  02/02/20 0003    Specimen:  Blood Updated:  02/02/20 0045     Troponin T 0.316 ng/mL     Narrative:       Troponin T Reference Range:  <= 0.03 ng/mL-   Negative for AMI  >0.03 ng/mL-     Abnormal for myocardial necrosis.  Clinicians would have to utilize clinical acumen, EKG, Troponin and serial changes to determine if it is an Acute Myocardial Infarction or myocardial injury due to an underlying chronic condition.       Results may be falsely decreased if patient taking Biotin.      Basic Metabolic Panel  "[[id number]]  (Abnormal) Collected:  02/02/20 0003    Specimen:  Blood Updated:  02/02/20 0036     Glucose 156 mg/dL      BUN 92 mg/dL      Creatinine 4.73 mg/dL      Sodium 131 mmol/L      Potassium 4.9 mmol/L      Chloride 92 mmol/L      CO2 21.0 mmol/L      Calcium 8.7 mg/dL      eGFR   Amer --     Comment: <15 Indicative of kidney failure.        eGFR Non African Amer 12 mL/min/1.73      Comment: <15 Indicative of kidney failure.        BUN/Creatinine Ratio 19.5     Anion Gap 18.0 mmol/L     Narrative:       GFR Normal >60  Chronic Kidney Disease <60  Kidney Failure <15      aPTT [[id number]]  (Normal) Collected:  02/02/20 0003    Specimen:  Blood Updated:  02/02/20 0023     PTT 74.6 seconds     Procalcitonin [[id number]]  (Abnormal) Collected:  02/01/20 1653    Specimen:  Blood Updated:  02/01/20 2005     Procalcitonin 0.47 ng/mL     Narrative:       As a Marker for Sepsis (Non-Neonates):   1. <0.5 ng/mL represents a low risk of severe sepsis and/or septic shock.  1. >2 ng/mL represents a high risk of severe sepsis and/or septic shock.    As a Marker for Lower Respiratory Tract Infections that require antibiotic therapy:  PCT on Admission     Antibiotic Therapy             6-12 Hrs later  > 0.5                Strongly Recommended            >0.25 - <0.5         Recommended  0.1 - 0.25           Discouraged                   Remeasure/reassess PCT  <0.1                 Strongly Discouraged          Remeasure/reassess PCT      As 28 day mortality risk marker: \"Change in Procalcitonin Result\" (> 80 % or <=80 %) if Day 0 (or Day 1) and Day 4 values are available. Refer to http://www.[employer]-pct-calculator.com/   Change in PCT <=80 %   A decrease of PCT levels below or equal to 80 % defines a positive change in PCT test result representing a higher risk for 28-day all-cause mortality of patients diagnosed with severe sepsis or septic shock.  Change in PCT > 80 %   A decrease of PCT levels of more than 80 % " defines a negative change in PCT result representing a lower risk for 28-day all-cause mortality of patients diagnosed with severe sepsis or septic shock.                Results may be falsely decreased if patient taking Biotin.     Blood Culture - Blood, Arm, Right [[id number]] Collected:  01/31/20 1949    Specimen:  Blood from Arm, Right Updated:  02/01/20 2000     Blood Culture No growth at 24 hours    Blood Culture - Blood, Arm, Left [[id number]] Collected:  01/1940    Specimen:  Blood from Arm, Left Updated:  02/01/20 1945     Blood Culture No growth at 24 hours    aPTT [[id number]]  (Normal) Collected:  02/01/20 1856    Specimen:  Blood Updated:  02/01/20 1937     PTT 73.3 seconds     MRSA Screen, PCR - Swab, Nares [[id number]]  (Normal) Collected:  02/01/20 1652    Specimen:  Swab from Nares Updated:  02/01/20 1916     MRSA PCR No MRSA Detected    Blood Gas, Arterial [[id number]]  (Abnormal) Collected:  02/01/20 1814    Specimen:  Arterial Blood Updated:  02/01/20 1817     Site Arterial Line     Binh's Test N/A     pH, Arterial 7.446 pH units      pCO2, Arterial 31.1 mm Hg      pO2, Arterial 99.9 mm Hg      HCO3, Arterial 21.5 mmol/L      Base Excess, Arterial -1.6 mmol/L      Comment: Serial Number: [id number]:  [id number]        O2 Saturation, Arterial 98.1 %      CO2 Content 22.4 mmol/L      Barometric Pressure for Blood Gas --     Comment: N/A        Modality Cannula     FIO2 40 %      Hemodilution No    Hepatic Function Panel [[id number]]  (Abnormal) Collected:  02/01/20 1653    Specimen:  Blood Updated:  02/01/20 1731     Total Protein 6.2 g/dL      Albumin 3.30 g/dL      ALT (SGPT) 103 U/L      AST (SGOT) 103 U/L      Comment: Specimen hemolyzed.  Results may be affected.        Alkaline Phosphatase 124 U/L      Total Bilirubin 2.1 mg/dL      Bilirubin, Direct 0.9 mg/dL      Comment: Specimen hemolyzed. Results may be affected.  Result checked         Bilirubin, Indirect 1.2 mg/dL     POC Glucose Once  [[id number]]  (Abnormal) Collected:  02/01/20 1620    Specimen:  Blood Updated:  02/01/20 1623     Glucose 138 mg/dL      Comment: Serial Number: [id number]:  [id number]       Basic Metabolic Panel [[id number]]  (Abnormal) Collected:  02/01/20 1404    Specimen:  Blood Updated:  02/01/20 1440     Glucose 167 mg/dL      BUN 93 mg/dL      Creatinine 4.72 mg/dL      Sodium 133 mmol/L      Potassium 5.6 mmol/L      Chloride 92 mmol/L      CO2 22.0 mmol/L      Calcium 9.3 mg/dL      eGFR   Amer --     Comment: <15 Indicative of kidney failure.        eGFR Non African Amer 12 mL/min/1.73      Comment: <15 Indicative of kidney failure.        BUN/Creatinine Ratio 19.7     Anion Gap 19.0 mmol/L     Narrative:       GFR Normal >60  Chronic Kidney Disease <60  Kidney Failure <15      aPTT [[id number]]  (Abnormal) Collected:  02/01/20 1404    Specimen:  Blood Updated:  02/01/20 1427     PTT 59.6 seconds      Comment: Result checked        Uric Acid [[id number]]  (Abnormal) Collected:  02/01/20 0227    Specimen:  Blood Updated:  02/01/20 1300     Uric Acid 12.5 mg/dL         Results for orders placed during the hospital encounter of 01/23/20   Adult Transthoracic Echo Complete W/ Cont if Necessary Per Protocol    Narrative · Estimated EF = 20%.  · Left ventricular systolic function is severely decreased.     Indications  Shortness of breath    Technically satisfactory study.  Mitral valve is structurally normal.  Moderate to significant mitral   regurgitation is present.  Tricuspid valve is structurally normal.  Moderate tricuspid regurgitation  Aortic valve is structurally normal.  Mild aortic regurgitation  Pulmonic valve could not be well visualized.  Biatrial enlargement  Significant biventricular enlargement  Significant biventricular diffuse hypocontractility with ejection fraction   of 20%.  Atrial septum is intact.  Aorta is normal.  No pericardial effusion or intracardiac thrombus is  seen.    Impression  Moderate to significant mitral regurgitation.  Moderate tricuspid regurgitation  Biatrial enlargement  Significant biventricular enlargement and dysfunction with ejection   fraction of 20%.  Findings consistent with cardiomyopathy.  ICD leads are present in the right ventricle.  No pericardial effusion or intracardiac thrombus is seen.           Medication Review:   I have reviewed the patient's current medication list  Scheduled Meds:  Acetylcysteine 1,200 mg Oral Q12H   albumin human 25 g Intravenous Once   azithromycin 500 mg Intravenous Q24H   budesonide 0.5 mg Nebulization BID - RT   cefTRIAXone 1 g Intravenous Q24H   chlorhexidine 15 mL Mouth/Throat Q12H   ipratropium-albuterol 3 mL Nebulization 4x Daily - RT   midodrine 10 mg Oral Q8H     Continuous Infusions:  amiodarone 0.5 mg/min Last Rate: 0.5 mg/min (02/02/20 0538)   dexmedetomidine 0.2-1.5 mcg/kg/hr Last Rate: 0.3 mcg/kg/hr (02/02/20 0424)   DOBUTamine 2.5 mcg/kg/min Last Rate: 2.5 mcg/kg/min (02/02/20 0512)   heparin 12 Units/kg/hr Last Rate: 11 Units/kg/hr (02/01/20 1839)   norepinephrine 0.02-0.5 mcg/kg/min Last Rate: 0.25 mcg/kg/min (02/02/20 0535)   phenylephrine 0.5-6 mcg/kg/min Last Rate: 6 mcg/kg/min (02/02/20 0632)   propofol 5-50 mcg/kg/min Last Rate: 30 mcg/kg/min (02/02/20 0445)   vasopressin 0.01-0.03 Units/min Last Rate: 0.03 Units/min (02/02/20 0859)     PRN Meds:.fentanyl  •  heparin  •  heparin  •  ipratropium-albuterol  •  midazolam  •  ondansetron  •  sodium chloride  •  sodium chloride    ECG/EMG Results (last 24 hours)     Procedure Component Value Units Date/Time    ECG 12 Lead [[id number]] Collected:  02/01/20 1500     Updated:  02/01/20 1502    Narrative:       HEART RATE= 112  bpm  RR Interval= 535  ms  SC Interval=   ms  P Horizontal Axis=   deg  P Front Axis=   deg  QRSD Interval= 185  ms  QT Interval= 419  ms  QRS Axis= 119  deg  T Wave Axis= -53  deg  - ABNORMAL ECG -  Atrial fibrillation  Right bundle  branch block  Lateral infarct, age indeterminate  Electronically Signed By:   Date and Time of Study: 2020-02-01 15:00:49    ECG 12 Lead [[id number]] Collected:  02/02/20 0332     Updated:  02/02/20 0333    Narrative:       HEART RATE= 123  bpm  RR Interval= 488  ms  UT Interval= 94  ms  P Horizontal Axis= 132  deg  P Front Axis= 161  deg  QRSD Interval= 222  ms  QT Interval= 427  ms  QRS Axis= 118  deg  T Wave Axis= -56  deg  - ABNORMAL ECG -  Sinus tachycardia with irregular rate  Right bundle branch block  Lateral infarct, age indeterminate  Electronically Signed By:   Date and Time of Study: 2020-02-02 03:32:26    Adult Transthoracic Echo Complete W/ Cont if Necessary Per Protocol [[id number]] Resulted:  02/02/20 0805     Updated:  02/02/20 0805     Target HR (85%) 117 bpm      Max. Pred. HR (100%) 138 bpm           Imaging Results (Last 24 Hours)     Procedure Component Value Units Date/Time    XR Chest 1 View [[id number]] Resulted:  02/02/20 0440     Updated:  02/02/20 0442    XR Abdomen KUB [[id number]] Resulted:  02/02/20 0436     Updated:  02/02/20 0439    XR Chest 1 View [[id number]] Collected:  02/01/20 1840     Updated:  02/01/20 1842    Narrative:       Examination: XR CHEST 1 VW-     Date of Exam: 2/1/2020 6:09 PM     Indication: line placement; R07.9-Chest pain, unspecified;  R06.00-Dyspnea, unspecified; I50.9-Heart failure, unspecified;  N17.9-Acute kidney failure, unspecified.     Comparison: 01/31/2020.     Technique: Single radiographic view of the chest was obtained.     Findings:  There is a new right internal jugular central venous catheter  terminating at the cavoatrial junction. There is moderate cardiomegaly.  There is a stable small left pleural effusion with left basilar airspace  disease/atelectasis. Stable left-sided ICD and evidence of prior median  sternotomy and CABG. The right lung remains clear. No evidence of  pneumothorax. No acute osseous abnormalities.       Impression:       New  right internal jugular central venous catheter terminates at the  cavoatrial junction. The remainder of the examination is unchanged.     Electronically Signed By-Jim Jacobs On:2/1/2020 6:40 PM  This report was finalized on [id number] by  Jim Jacobs, .          Assessment/Plan       Chest pain        Linda Shirley MD  02/02/20  9:15 AM

## 2020-02-02 NOTE — NURSING NOTE
Jorge Love NP at bedside to intubate pt. Pt went unresponsive with continual hypotension(had been treating overnight with multiple pressors). Cardiology called with no new orders, they are aware of situation and okay with plan to intubate and continue aggressive pressure support. Will get an EKG and stat labs. Family notified of pts condition and are on their way back to the hospital.

## 2020-02-02 NOTE — SIGNIFICANT NOTE
Overnight, patient was started on vasopressors, initially vasopressin and levophed, but patient began to experience some tachycardia, thus he was transitioned from levophed to neosynephrine drip without difficulty.  At approximately 3:10 am, patient felt like he needed to have another bowel movement, became short of breath, with complaint of epigastric pain the further developed unresponsiveness with an accompanying sharp decline in his blood pressure with an SBP in the 40s.  Patient remained with pulse and breathing.  Labs were drawn as well as an ABG. Patient required addition again of levophed and dobutamine was titrated from 2.5 mcg/kg/min to 7.5 mcg/kg/min with improvement in blood pressure.  Patient was virtually unresponsive but would occasionally move his extremities.  No unilateral deficits were identified.   Patient did have movement of all extremities once blood pressure recovered.  EKG was obtained with acute changes.  Dr. Haq, on call cardiologist was contacted by nursing staff and updated while I was at bedside with patient.  He had nothing further to add, and felt that the current interventions were necessary and appropriate, and also agreed with proceeding with intubation.  Please see intubation note.  Also of note, amiodarone was paused for a duration of this episode, as patient's heart rate was declining for a brief few minutes, but has now been resumed.  Patient received 2 amps of sodium bicarbonate as well as 1 amp of Calcium chloride based on EPOC results.  Patient's family was also called and updated on current situation, patient is very high risk for decompensation.  On-call attending physician informed of changes, and agreed with current plan of care.    Abhishek Akers, APRN  2/2/2020

## 2020-02-02 NOTE — PROGRESS NOTES
"NEPHROLOGY PROGRESS NOTE------[facility]    [facility]  [phone]  Tony Cordero MD      Patient Care Team:  Yassine Haines MD as PCP - General  Yassine Haines MD as PCP - Family Medicine  Amalia Leal MD as PCP - Claims Attributed  Amalia Leal MD as Consulting Physician (Cardiology)  Trang West MD as Consulting Physician (Nephrology)      Provider:  Tony Cordero MD  Patient Name: Luis E Rahman  :  1940    SUBJECTIVE:  F/u MASOUD/CKD  Intubated now, multiple pressors.      Medication:    Acetylcysteine 1,200 mg Oral Q12H   albumin human 25 g Intravenous Once   azithromycin 500 mg Intravenous Q24H   budesonide 0.5 mg Nebulization BID - RT   cefTRIAXone 1 g Intravenous Q24H   chlorhexidine 15 mL Mouth/Throat Q12H   ipratropium-albuterol 3 mL Nebulization 4x Daily - RT   midodrine 10 mg Oral Q8H       amiodarone 0.5 mg/min Last Rate: 0.5 mg/min (20 0538)   dexmedetomidine 0.2-1.5 mcg/kg/hr Last Rate: 0.3 mcg/kg/hr (20 0424)   DOBUTamine 2.5 mcg/kg/min Last Rate: 2.5 mcg/kg/min (20 0512)   heparin 12 Units/kg/hr Last Rate: 11 Units/kg/hr (20 1839)   norepinephrine 0.02-0.5 mcg/kg/min Last Rate: 0.125 mcg/kg/min (20 0950)   phenylephrine 0.5-6 mcg/kg/min Last Rate: 6 mcg/kg/min (20 1039)   propofol 5-50 mcg/kg/min Last Rate: Stopped (20 0946)   vasopressin 0.01-0.03 Units/min Last Rate: 0.03 Units/min (20 0859)       OBJECTIVE    Vital Sign Min/Max for last 24 hours  Temp  Min: 97.3 °F (36.3 °C)  Max: 98.2 °F (36.8 °C)   BP  Min: 67/19  Max: 136/113   Pulse  Min: 95  Max: 120   Resp  Min: 16  Max: 20   SpO2  Min: 74 %  Max: 100 %   No data recorded   Weight  Min: 68.3 kg (150 lb 9.2 oz)  Max: 68.3 kg (150 lb 9.2 oz)     Flowsheet Rows      First Filed Value   Admission Height  180.3 cm (71\") Documented at 2020   Admission Weight  73.5 kg (162 lb) Documented at 2020    "       No intake/output data recorded.  I/O last 3 completed shifts:  In: 1400 [I.V.:1400]  Out: 400 [Urine:400]    Physical Exam:  General Appearance: intubated/ sedated  Head: normocephalic, without obvious abnormality and atraumatic  Eyes: conjunctivae and sclerae normal and no icterus  Neck: supple and no JVD  Lungs: clear to auscultation and respirations regular  Heart: regular rhythm & normal rate and normal S1, S2  Chest: Wall no abnormalities observed  Abdomen: normal bowel sounds and soft non-tender  Extremities: traceedema  Skin: no bleeding  Neurologic: sedated    Labs:    WBC WBC   Date Value Ref Range Status   02/02/2020 11.80 (H) 3.40 - 10.80 10*3/mm3 Final   01/31/2020 9.60 3.40 - 10.80 10*3/mm3 Final      HGB Hemoglobin   Date Value Ref Range Status   02/02/2020 14.7 13.0 - 17.7 g/dL Final   02/02/2020 15.9 12.0 - 17.0 g/dL Final   01/31/2020 16.1 13.0 - 17.7 g/dL Final      HCT Hematocrit   Date Value Ref Range Status   02/02/2020 43.8 37.5 - 51.0 % Final   02/02/2020 47 38 - 51 % Final   01/31/2020 48.9 37.5 - 51.0 % Final      Platlets No results found for: LABPLAT   MCV MCV   Date Value Ref Range Status   02/02/2020 106.7 (H) 79.0 - 97.0 fL Final   01/31/2020 107.1 (H) 79.0 - 97.0 fL Final          Sodium Sodium   Date Value Ref Range Status   02/02/2020 131 (L) 136 - 145 mmol/L Final   02/02/2020 131 (L) 136 - 145 mmol/L Final   02/01/2020 133 (L) 136 - 145 mmol/L Final   02/01/2020 133 (L) 136 - 145 mmol/L Final   01/31/2020 133 (L) 136 - 145 mmol/L Final      Potassium Potassium   Date Value Ref Range Status   02/02/2020 5.5 (H) 3.5 - 5.2 mmol/L Final   02/02/2020 4.9 3.5 - 5.2 mmol/L Final   02/01/2020 5.6 (H) 3.5 - 5.2 mmol/L Final   02/01/2020 5.3 (H) 3.5 - 5.2 mmol/L Final   01/31/2020 5.0 3.5 - 5.2 mmol/L Final      Chloride Chloride   Date Value Ref Range Status   02/02/2020 93 (L) 98 - 107 mmol/L Final   02/02/2020 92 (L) 98 - 107 mmol/L Final   02/01/2020 92 (L) 98 - 107 mmol/L Final    02/01/2020 92 (L) 98 - 107 mmol/L Final   01/31/2020 91 (L) 98 - 107 mmol/L Final      CO2 CO2   Date Value Ref Range Status   02/02/2020 15.0 (L) 22.0 - 29.0 mmol/L Final     Comment:     Result checked    02/02/2020 21.0 (L) 22.0 - 29.0 mmol/L Final   02/01/2020 22.0 22.0 - 29.0 mmol/L Final   02/01/2020 25.0 22.0 - 29.0 mmol/L Final   01/31/2020 23.0 22.0 - 29.0 mmol/L Final      BUN BUN   Date Value Ref Range Status   02/02/2020 87 (H) 8 - 23 mg/dL Final   02/02/2020 92 (H) 8 - 23 mg/dL Final   02/01/2020 93 (H) 8 - 23 mg/dL Final   02/01/2020 83 (H) 8 - 23 mg/dL Final   01/31/2020 77 (H) 8 - 23 mg/dL Final      Creatinine Creatinine   Date Value Ref Range Status   02/02/2020 4.55 (H) 0.76 - 1.27 mg/dL Final   02/02/2020 4.73 (H) 0.76 - 1.27 mg/dL Final   02/01/2020 4.72 (H) 0.76 - 1.27 mg/dL Final   02/01/2020 4.34 (H) 0.76 - 1.27 mg/dL Final   01/31/2020 4.06 (H) 0.76 - 1.27 mg/dL Final      Calcium Calcium   Date Value Ref Range Status   02/02/2020 8.4 (L) 8.6 - 10.5 mg/dL Final   02/02/2020 8.7 8.6 - 10.5 mg/dL Final   02/01/2020 9.3 8.6 - 10.5 mg/dL Final   02/01/2020 10.1 8.6 - 10.5 mg/dL Final   01/31/2020 10.7 (H) 8.6 - 10.5 mg/dL Final      PO4 No components found for: PO4   Albumin Albumin   Date Value Ref Range Status   02/02/2020 3.20 (L) 3.50 - 5.20 g/dL Final   02/01/2020 3.30 (L) 3.50 - 5.20 g/dL Final   01/31/2020 4.00 3.50 - 5.20 g/dL Final      Magnesium Magnesium   Date Value Ref Range Status   02/02/2020 2.9 (H) 1.6 - 2.4 mg/dL Final   02/02/2020 2.9 (H) 1.6 - 2.4 mg/dL Final      Uric Acid No components found for: URIC ACID     Imaging Results (Last 72 Hours)     Procedure Component Value Units Date/Time    XR Abdomen KUB [[id number]] Collected:  02/02/20 0923     Updated:  02/02/20 0926    Narrative:       DATE OF EXAM:  2/2/2020 3:55 AM     PROCEDURE:  XR ABDOMEN KUB-     INDICATIONS:  NG tube placement     COMPARISON:  No Comparisons Available     TECHNIQUE:   Single radiographic view of  the abdomen was obtained.        FINDINGS:  Enteric tube terminates in the mid upper abdomen, likely in the distal  gastric body. Visualized bowel gas pattern appears grossly unremarkable  without definite bowel dilatation. No definite ectopic bowel gas is  seen. There is right convex curvature of the lumbar spine with suspected  advanced degenerative changes.        Impression:       1.Enteric tube appears to terminate in the mid upper abdomen, likely in  the distal gastric body.  2.Bowel gas pattern appears grossly unremarkable.     Electronically Signed By-DR. Jian Santiago MD On:2/2/2020 9:24 AM  This report was finalized on [id number] by DR. Jian Santiago MD.    XR Chest 1 View [[id number]] Collected:  02/02/20 0921     Updated:  02/02/20 0925    Narrative:       DATE OF EXAM:  2/2/2020 3:55 AM     PROCEDURE:  XR CHEST 1 VW-     INDICATIONS:  Intubation, chest pain, shortness of air     COMPARISON:  02/01/2020     TECHNIQUE:   Single radiographic view of the chest was obtained.     FINDINGS:  Endotracheal tube tip terminates at the level of the superior arch,  estimated to be approximately 4 cm above the mckinley.     Enteric tube extends into the upper abdomen, tip beyond the margins of  this exam.  Right IJ catheter terminates in the region of the superior  right atrium, as before. No definite pneumothorax is seen.  The heart is  enlarged. Patient is status post median sternotomy and CABG. ICD is  present. There is atherosclerosis of the aorta. There is left basilar  opacity with possible small left effusion, as before. No definite new  infiltrate or significant right effusion.       Impression:       1.Endotracheal tube tip is approximately 4 cm above the mckinley.  2.Enteric tube appears to extend into the upper abdomen, tip beyond the  margins of this exam.  3.Cardiomegaly with left effusion and left basilar opacities, as before.     Electronically Signed By-DR. Jian Santiago MD On:2/2/2020 9:23 AM  This  report was finalized on [id number] by DR. Jian Santiago MD.    XR Chest 1 View [[id number]] Collected:  02/01/20 1840     Updated:  02/01/20 1842    Narrative:       Examination: XR CHEST 1 VW-     Date of Exam: 2/1/2020 6:09 PM     Indication: line placement; R07.9-Chest pain, unspecified;  R06.00-Dyspnea, unspecified; I50.9-Heart failure, unspecified;  N17.9-Acute kidney failure, unspecified.     Comparison: 01/31/2020.     Technique: Single radiographic view of the chest was obtained.     Findings:  There is a new right internal jugular central venous catheter  terminating at the cavoatrial junction. There is moderate cardiomegaly.  There is a stable small left pleural effusion with left basilar airspace  disease/atelectasis. Stable left-sided ICD and evidence of prior median  sternotomy and CABG. The right lung remains clear. No evidence of  pneumothorax. No acute osseous abnormalities.       Impression:       New right internal jugular central venous catheter terminates at the  cavoatrial junction. The remainder of the examination is unchanged.     Electronically Signed By-Jim Jacobs On:2/1/2020 6:40 PM  This report was finalized on [id number] by  Jim Jacobs, .    XR Chest 1 View [[id number]] Collected:  01/31/20 2014     Updated:  01/31/20 2030    Narrative:       DATE OF EXAM:  1/31/2020 8:08 PM     PROCEDURE:  XR CHEST 1 VW-     INDICATIONS:  Chest pain, tobacco abuse, shortness of breath, heart disease.      COMPARISON:  01/23/2020.     TECHNIQUE:   Single radiographic view of the chest was obtained.     FINDINGS:  The heart is enlarged. There are postsurgical changes apparent which  includes a left-sided transvenous pacemaker/intracardiac defibrillator.  There is abnormal medial left basilar consolidation suspicious for  chronic scarring or atelectasis. The patient has a large hiatal hernia.  The right lung is clear. There are no pleural effusions.  There are  chronic age-related changes  involving the bony thorax and thoracic  aorta.       Impression:          1. Cardiomegaly.  2. Abnormal medial left basilar consolidation suspicious for either  chronic scarring or atelectasis. There is also a hiatal hernia.     Electronically Signed By-Adrian Morris On:1/31/2020 8:27 PM  This report was finalized on [id number] by  Adrian Morris, .          Results for orders placed during the hospital encounter of 01/31/20   XR Abdomen KUB    Narrative DATE OF EXAM:  2/2/2020 3:55 AM     PROCEDURE:  XR ABDOMEN KUB-     INDICATIONS:  NG tube placement     COMPARISON:  No Comparisons Available     TECHNIQUE:   Single radiographic view of the abdomen was obtained.        FINDINGS:  Enteric tube terminates in the mid upper abdomen, likely in the distal  gastric body. Visualized bowel gas pattern appears grossly unremarkable  without definite bowel dilatation. No definite ectopic bowel gas is  seen. There is right convex curvature of the lumbar spine with suspected  advanced degenerative changes.        Impression 1.Enteric tube appears to terminate in the mid upper abdomen, likely in  the distal gastric body.  2.Bowel gas pattern appears grossly unremarkable.     Electronically Signed By-DR. Jian Santiago MD On:2/2/2020 9:24 AM  This report was finalized on [id number] by DR. Jian Santiago MD.   XR Chest 1 View    Narrative DATE OF EXAM:  2/2/2020 3:55 AM     PROCEDURE:  XR CHEST 1 VW-     INDICATIONS:  Intubation, chest pain, shortness of air     COMPARISON:  02/01/2020     TECHNIQUE:   Single radiographic view of the chest was obtained.     FINDINGS:  Endotracheal tube tip terminates at the level of the superior arch,  estimated to be approximately 4 cm above the mckinley.     Enteric tube extends into the upper abdomen, tip beyond the margins of  this exam.  Right IJ catheter terminates in the region of the superior  right atrium, as before. No definite pneumothorax is seen.  The heart is  enlarged. Patient is status  post median sternotomy and CABG. ICD is  present. There is atherosclerosis of the aorta. There is left basilar  opacity with possible small left effusion, as before. No definite new  infiltrate or significant right effusion.       Impression 1.Endotracheal tube tip is approximately 4 cm above the mckinley.  2.Enteric tube appears to extend into the upper abdomen, tip beyond the  margins of this exam.  3.Cardiomegaly with left effusion and left basilar opacities, as before.     Electronically Signed By-DR. Jian Santiago MD On:2/2/2020 9:23 AM  This report was finalized on [id number] by DR. Jian Santiago MD.   XR Chest 1 View    Narrative Examination: XR CHEST 1 VW-     Date of Exam: 2/1/2020 6:09 PM     Indication: line placement; R07.9-Chest pain, unspecified;  R06.00-Dyspnea, unspecified; I50.9-Heart failure, unspecified;  N17.9-Acute kidney failure, unspecified.     Comparison: 01/31/2020.     Technique: Single radiographic view of the chest was obtained.     Findings:  There is a new right internal jugular central venous catheter  terminating at the cavoatrial junction. There is moderate cardiomegaly.  There is a stable small left pleural effusion with left basilar airspace  disease/atelectasis. Stable left-sided ICD and evidence of prior median  sternotomy and CABG. The right lung remains clear. No evidence of  pneumothorax. No acute osseous abnormalities.       Impression New right internal jugular central venous catheter terminates at the  cavoatrial junction. The remainder of the examination is unchanged.     Electronically Signed By-Jim Jacobs On:2/1/2020 6:40 PM  This report was finalized on [id number] by  Jim Jacobs, .              ASSESSMENT / PLAN      Chest pain    · MASOUD--ATN due to hypoyension, dehydration, diuresis  · CKD4  · Hyperkalemia--low K diet, No ACEi/ARBs  · HTN--BP low. Continue midodrine  · Hx CAD/ angina--per cards. Would hold off cath until renal function improves  · Ischemic  cardiomyopathy  · Sepsis with shock     Gently hydrate  Hold diuretics  Will follow        Tony Cordero MD  [facility]  [phone]  02/02/20  12:12 PM

## 2020-02-03 NOTE — NURSING NOTE
Pt's family waiting on all family to arrive to withdraw care. They requested no meds and assessments.

## 2020-02-03 NOTE — PLAN OF CARE
Problem: Patient Care Overview  Goal: Plan of Care Review  Outcome: Ongoing (interventions implemented as appropriate)  Flowsheets  Taken 2/3/2020 0719  Progress: declining  Taken 2/3/2020 0400  Plan of Care Reviewed With: family  Note:   Pt became unstable overnight; blood pressures in the 50's: pressors increased and digna restarted-versed turned off; JAME Mejia notified and was present at bedside; pt became stable but still remains on all drips; versed restarted.  Pt urine output minimal overnight- JAME Mejia notified.  Will continue to monitor.

## 2020-02-03 NOTE — PROGRESS NOTES
Referring Provider: Devon Ariza MD    Reason for follow-up:  Ischemic cardiomyopathy  Status post ICD  Status post CABG  Respiratory insufficiency-intubated  Atrial fibrillation  Sepsis  Persistent hypotension requiring multiple pressors.     Patient Care Team:  Yassine Haines MD as PCP - General  Yassine Haines MD as PCP - Family Medicine  Amalia Leal MD as PCP - Claims Attributed  Amalia Leal MD as Consulting Physician (Cardiology)  Trang West MD as Consulting Physician (Nephrology)    Subjective .  Unable to patient is intubated and on the respirator.  Patient is sedated.     ROS      History  Past Medical History:   Diagnosis Date   • Atrial fibrillation (CMS/[facility])    • CHF (congestive heart failure) (CMS/[facility])    • Hyperlipidemia    • Hypertension    • Myocardial infarction (CMS/[facility])        Past Surgical History:   Procedure Laterality Date   • CARDIAC CATHETERIZATION     • CARDIAC ELECTROPHYSIOLOGY PROCEDURE N/A 9/6/2019    Procedure: ICD DC generator change;  Surgeon: Amalia Leal MD;  Location:  [facility];  Service: Cardiovascular   • CARDIAC ELECTROPHYSIOLOGY PROCEDURE N/A 9/6/2019    Procedure: Pocket Revision;  Surgeon: Amalia Leal MD;  Location:  [facility];  Service: Cardiovascular   • CORONARY ANGIOPLASTY WITH STENT PLACEMENT     • CORONARY ARTERY BYPASS GRAFT     • PACEMAKER REPLACEMENT         No family history on file.    Social History     Tobacco Use   • Smoking status: Current Every Day Smoker     Packs/day: 0.50     Types: Cigarettes   • Smokeless tobacco: Never Used   Substance Use Topics   • Alcohol use: No     Frequency: Never   • Drug use: Not on file        Medications Prior to Admission   Medication Sig Dispense Refill Last Dose   • albuterol sulfate HFA (VENTOLIN HFA) 108 (90 Base) MCG/ACT inhaler Inhale 1 puff Every 6 (Six) Hours As Needed for Shortness of Air.   Not Taking   • amiodarone (PACERONE)  200 MG tablet Take 200 mg by mouth Daily.      • aspirin (ASPIR-LOW) 81 MG EC tablet Take 81 mg by mouth Daily.   Taking   • atorvastatin (LIPITOR) 10 MG tablet Take 10 mg by mouth Daily.      • budesonide-formoterol (SYMBICORT) 160-4.5 MCG/ACT inhaler Inhale 2 puffs 2 (Two) Times a Day.      • bumetanide (BUMEX) 2 MG tablet Take 1 tablet by mouth 2 (Two) Times a Day for 30 days. 60 tablet 0    • carvedilol (COREG) 3.125 MG tablet Take 1 tablet by mouth 2 (Two) Times a Day for 30 days. 60 tablet 3    • clopidogrel (PLAVIX) 75 MG tablet Take 75 mg by mouth Daily.      • febuxostat (ULORIC) 40 MG tablet Take 1 tablet by mouth Daily for 30 days. 30 tablet 5    • FEROSUL 325 (65 Fe) MG tablet Take 325 mg by mouth Daily With Breakfast.   Taking   • levothyroxine (SYNTHROID, LEVOTHROID) 50 MCG tablet Take 1 tablet by mouth Daily for 30 days. 30 tablet 3    • vitamin D (ERGOCALCIFEROL) 66443 units capsule capsule Take 50,000 Units by mouth Every 14 (Fourteen) Days.   Taking       Allergies  Patient has no known allergies.    Scheduled Meds:  albumin human 25 g Intravenous Once   azithromycin 500 mg Intravenous Q24H   budesonide 0.5 mg Nebulization BID - RT   cefTRIAXone 1 g Intravenous Q24H   chlorhexidine 15 mL Mouth/Throat Q12H   insulin lispro 0-7 Units Subcutaneous Q6H   ipratropium-albuterol 3 mL Nebulization Q4H - RT   midodrine 10 mg Oral Q8H   sodium bicarbonate        Continuous Infusions:  amiodarone 0.5 mg/min Last Rate: 0.5 mg/min (02/03/20 0432)   dexmedetomidine 0.2-1.5 mcg/kg/hr Last Rate: 0.3 mcg/kg/hr (02/02/20 0424)   DOBUTamine 2.5 mcg/kg/min Last Rate: 5 mcg/kg/min (02/03/20 0432)   heparin 12 Units/kg/hr Last Rate: 11 Units/kg/hr (02/03/20 0433)   midazolam 1-10 mg/hr Last Rate: 3 mg/hr (02/03/20 0448)   norepinephrine 0.02-0.5 mcg/kg/min Last Rate: 0.5 mcg/kg/min (02/03/20 0433)   phenylephrine 0.5-6 mcg/kg/min Last Rate: 4 mcg/kg/min (02/03/20 0433)   sodium chloride 100 mL/hr Last Rate: 100 mL/hr  "(02/03/20 0433)   vasopressin 0.01-0.03 Units/min Last Rate: 0.03 Units/min (02/03/20 0434)     PRN Meds:.dextrose  •  dextrose  •  fentanyl  •  glucagon (human recombinant)  •  heparin  •  heparin  •  insulin lispro **AND** insulin lispro  •  ipratropium-albuterol  •  midazolam  •  ondansetron  •  sodium chloride  •  sodium chloride    Objective     VITAL SIGNS  Vitals:    02/03/20 0530 02/03/20 0545 02/03/20 0600 02/03/20 0615   BP:       BP Location:       Patient Position:       Pulse: 112 112 113 113   Resp:       Temp:       TempSrc:       SpO2:       Weight:   77.3 kg (170 lb 6.6 oz)    Height:           Flowsheet Rows      First Filed Value   Admission Height  180.3 cm (71\") Documented at 01/31/2020 1921   Admission Weight  73.5 kg (162 lb) Documented at 01/31/2020 1921            Intake/Output Summary (Last 24 hours) at 2/3/2020 0629  Last data filed at 2/3/2020 0600  Gross per 24 hour   Intake 5381 ml   Output 30 ml   Net 5351 ml        TELEMETRY: Atrial fibrillation    Physical Exam:  The patient is intubated and sedated and on the respirator.  Patient is on multiple pressors.  Vital signs as noted above.  Head and neck revealed no carotid bruits or jugular venous distention.  No thyromegaly or lymphadenopathy is present  Lungs clear.  No wheezing.  Breath sounds are normal bilaterally.  Heart normal first and second heart sounds.  No murmur. No precordial rub is present.  No gallop is present.  Abdomen soft and nontender.  No organomegaly is present.  Extremities with good peripheral pulses without any pedal edema.  Skin warm and dry.  Multiple healthy tattoos.  ICD site looks normal.  Musculoskeletal system is grossly normal  CNS grossly normal      Results Review:   I reviewed the patient's new clinical results.  Lab Results (last 24 hours)     Procedure Component Value Units Date/Time    Renal Function Panel [[id number]]  (Abnormal) Collected:  02/03/20 0503    Specimen:  Blood Updated:  02/03/20 0557 "     Glucose 159 mg/dL      BUN 89 mg/dL      Creatinine 4.71 mg/dL      Sodium 134 mmol/L      Potassium 4.5 mmol/L      Chloride 95 mmol/L      CO2 19.0 mmol/L      Calcium 8.6 mg/dL      Albumin 2.80 g/dL      Phosphorus 7.1 mg/dL      Anion Gap 20.0 mmol/L      BUN/Creatinine Ratio 18.9     eGFR Non African Amer 12 mL/min/1.73      Comment: <15 Indicative of kidney failure.        eGFR   Amer --     Comment: <15 Indicative of kidney failure.       Narrative:       GFR Normal >60  Chronic Kidney Disease <60  Kidney Failure <15      Lactic Acid, Plasma [[id number]]  (Abnormal) Collected:  02/03/20 0503    Specimen:  Blood Updated:  02/03/20 0556     Lactate 4.1 mmol/L     Troponin [[id number]]  (Abnormal) Collected:  02/03/20 0503    Specimen:  Blood Updated:  02/03/20 0556     Troponin T 0.439 ng/mL     Narrative:       Troponin T Reference Range:  <= 0.03 ng/mL-   Negative for AMI  >0.03 ng/mL-     Abnormal for myocardial necrosis.  Clinicians would have to utilize clinical acumen, EKG, Troponin and serial changes to determine if it is an Acute Myocardial Infarction or myocardial injury due to an underlying chronic condition.       Results may be falsely decreased if patient taking Biotin.      aPTT [[id number]]  (Normal) Collected:  02/03/20 0503    Specimen:  Blood Updated:  02/03/20 0528     PTT 62.3 seconds     CBC Auto Differential [[id number]]  (Abnormal) Collected:  02/03/20 0504    Specimen:  Blood Updated:  02/03/20 0520     WBC 13.20 10*3/mm3      RBC 4.13 10*6/mm3      Hemoglobin 14.9 g/dL      Hematocrit 43.9 %      .2 fL      MCH 36.1 pg      MCHC 34.0 g/dL      RDW 15.1 %      RDW-SD 55.1 fl      MPV 12.3 fL      Platelets 88 10*3/mm3     Manual Differential [[id number]] Collected:  02/03/20 0504    Specimen:  Blood Updated:  02/03/20 0514    CBC & Differential [[id number]] Collected:  02/03/20 0504    Specimen:  Blood Updated:  02/03/20 0510    Narrative:       The following orders were  created for panel order CBC & Differential.  Procedure                               Abnormality         Status                     ---------                               -----------         ------                     CBC Auto Differential[[id number]]        Abnormal            Preliminary result           Please view results for these tests on the individual orders.    POC Glucose Once [[id number]]  (Abnormal) Collected:  02/03/20 0507    Specimen:  Blood Updated:  02/03/20 0508     Glucose 145 mg/dL      Comment: Serial Number: [id number]:  [id number]       POC Lactate [[id number]]  (Abnormal) Collected:  02/03/20 0212    Specimen:  Blood Updated:  02/03/20 0441     Lactate 3.7 mmol/L      Comment: Serial Number: [id number]:  [id number]       Blood Gas, Arterial [[id number]]  (Abnormal) Collected:  02/03/20 0409    Specimen:  Arterial Blood Updated:  02/03/20 0411     Site Arterial Line     Binh's Test N/A     pH, Arterial 7.412 pH units      pCO2, Arterial 31.9 mm Hg      pO2, Arterial 99.9 mm Hg      HCO3, Arterial 20.3 mmol/L      Base Excess, Arterial -3.3 mmol/L      Comment: Serial Number: [id number]:  [id number]        O2 Saturation, Arterial 97.9 %      CO2 Content 21.3 mmol/L      Barometric Pressure for Blood Gas --     Comment: N/A        Modality Adult Vent     FIO2 40 %      Ventilator Mode ;AC     Set Tidal Volume 500     PEEP 5     Hemodilution No     Respiratory Rate 20    CBC (No Diff) [[id number]]  (Abnormal) Collected:  02/03/20 0236    Specimen:  Blood Updated:  02/03/20 0245     WBC 12.60 10*3/mm3      RBC 3.82 10*6/mm3      Hemoglobin 13.6 g/dL      Hematocrit 40.6 %      .3 fL      MCH 35.6 pg      MCHC 33.5 g/dL      RDW 15.2 %      RDW-SD 55.6 fl      MPV 12.2 fL      Platelets 85 10*3/mm3     Blood Gas, Arterial [[id number]]  (Abnormal) Collected:  02/03/20 0212    Specimen:  Arterial Blood Updated:  02/03/20 0217     Site Arterial Line     Binh's Test N/A     pH, Arterial 7.449 pH  units      pCO2, Arterial 24.0 mm Hg      pO2, Arterial 96.2 mm Hg      HCO3, Arterial 16.6 mmol/L      Base Excess, Arterial -5.5 mmol/L      Comment: Serial Number: [id number]:  [id number]        O2 Saturation, Arterial 98.0 %      CO2 Content 17.4 mmol/L      Barometric Pressure for Blood Gas --     Comment: N/A        Modality Adult Vent     FIO2 40 %      Ventilator Mode ;AC     Set Tidal Volume 500     PEEP 5     Hemodilution No     Respiratory Rate 20    POCT Electrolytes +HGB +HCT [[id number]]  (Abnormal) Collected:  02/03/20 0212    Specimen:  Blood Updated:  02/03/20 0217     Sodium 131 mmol/L      POC Potassium 4.3 mmol/L      Ionized Calcium 0.93 mmol/L      Comment: Serial Number: [id number]:  [id number]        Glucose 198 mg/dL      Hematocrit 40 %      Hemoglobin 13.6 g/dL     Troponin [[id number]]  (Abnormal) Collected:  02/03/20 0013    Specimen:  Blood Updated:  02/03/20 0048     Troponin T 0.398 ng/mL     Narrative:       Troponin T Reference Range:  <= 0.03 ng/mL-   Negative for AMI  >0.03 ng/mL-     Abnormal for myocardial necrosis.  Clinicians would have to utilize clinical acumen, EKG, Troponin and serial changes to determine if it is an Acute Myocardial Infarction or myocardial injury due to an underlying chronic condition.       Results may be falsely decreased if patient taking Biotin.      Lactic Acid, Plasma [[id number]]  (Abnormal) Collected:  02/03/20 0013    Specimen:  Blood Updated:  02/03/20 0048     Lactate 2.2 mmol/L     POC Glucose Once [[id number]]  (Abnormal) Collected:  02/03/20 0017    Specimen:  Blood Updated:  02/03/20 0018     Glucose 207 mg/dL      Comment: Serial Number: [id number]:  [id number]       Blood Culture - Blood, Arm, Right [[id number]] Collected:  01/31/20 1949    Specimen:  Blood from Arm, Right Updated:  02/02/20 2000     Blood Culture No growth at 2 days    Blood Culture - Blood, Arm, Left [[id number]] Collected:  01/1940    Specimen:  Blood from Arm,  Left Updated:  02/02/20 1945     Blood Culture No growth at 2 days    Troponin [[id number]]  (Abnormal) Collected:  02/02/20 1833    Specimen:  Blood Updated:  02/02/20 1903     Troponin T 0.378 ng/mL     Narrative:       Troponin T Reference Range:  <= 0.03 ng/mL-   Negative for AMI  >0.03 ng/mL-     Abnormal for myocardial necrosis.  Clinicians would have to utilize clinical acumen, EKG, Troponin and serial changes to determine if it is an Acute Myocardial Infarction or myocardial injury due to an underlying chronic condition.       Results may be falsely decreased if patient taking Biotin.      Lactic Acid, Plasma [[id number]]  (Abnormal) Collected:  02/02/20 1833    Specimen:  Blood Updated:  02/02/20 1902     Lactate 2.4 mmol/L     POC Glucose Once [[id number]]  (Abnormal) Collected:  02/02/20 1620    Specimen:  Blood Updated:  02/02/20 1622     Glucose 150 mg/dL      Comment: Serial Number: [id number]:  [id number]       Troponin [[id number]]  (Abnormal) Collected:  02/02/20 1212    Specimen:  Blood Updated:  02/02/20 1248     Troponin T 0.365 ng/mL     Narrative:       Troponin T Reference Range:  <= 0.03 ng/mL-   Negative for AMI  >0.03 ng/mL-     Abnormal for myocardial necrosis.  Clinicians would have to utilize clinical acumen, EKG, Troponin and serial changes to determine if it is an Acute Myocardial Infarction or myocardial injury due to an underlying chronic condition.       Results may be falsely decreased if patient taking Biotin.      Lactic Acid, Plasma [[id number]]  (Abnormal) Collected:  02/02/20 1212    Specimen:  Blood Updated:  02/02/20 1242     Lactate 3.8 mmol/L     POC Glucose Once [[id number]]  (Abnormal) Collected:  02/02/20 1151    Specimen:  Blood Updated:  02/02/20 1158     Glucose 111 mg/dL      Comment: Serial Number: [id number]:  [id number]       [place] Draw [[id number]] Collected:  01/1940    Specimen:  Blood Updated:  02/02/20 0730    Narrative:       The following  orders were created for panel order [place] Draw.  Procedure                               Abnormality         Status                     ---------                               -----------         ------                     Light Blue Top[[id number]]                                   Final result               Green Top (Gel)[[id number]]                                                             Lavender Top[[id number]]                                     Final result               Gold Top - SST[[id number]]                                   Final result                 Please view results for these tests on the individual orders.    Light Blue Top [[id number]] Collected:  02/02/20 0623    Specimen:  Blood Updated:  02/02/20 0730     Extra Tube hold for add-on     Comment: Auto resulted       aPTT [[id number]]  (Abnormal) Collected:  02/02/20 0623    Specimen:  Blood Updated:  02/02/20 0730     PTT 56.7 seconds     Protime-INR [[id number]]  (Abnormal) Collected:  02/02/20 0623    Specimen:  Blood Updated:  02/02/20 0730     Protime 17.3 Seconds      INR 1.79    Respiratory Culture - Sputum, ET Suction [[id number]] Collected:  02/02/20 0458    Specimen:  Sputum from ET Suction Updated:  02/02/20 0713     Gram Stain Rare (1+) Epithelial cells per low power field      Many (4+) WBCs per low power field      Rare (1+) Mixed bacterial morphotypes seen on Gram Stain    Lactic Acid, Plasma [[id number]]  (Abnormal) Collected:  02/02/20 0623    Specimen:  Blood Updated:  02/02/20 0656     Lactate 4.8 mmol/L     Vancomycin, Random [[id number]]  (Normal) Collected:  02/02/20 0623    Specimen:  Blood Updated:  02/02/20 0655     Vancomycin Random 9.20 mcg/mL           Imaging Results (Last 24 Hours)     Procedure Component Value Units Date/Time    XR Chest 1 View [[id number]] Collected:  02/03/20 0056     Updated:  02/03/20 0310    Narrative:       EXAMINATION: XR CHEST 1 VW    DATE: 2/3/2020 2:24 AM    INDICATION:   Hypotension;    COMPARISON:  2/2/2020    FINDINGS:  Endotracheal tube and small right jugular venous catheter unchanged. Esophogastric tube tip past GE junction and off image redemonstrated.    Moderate bilateral pleural effusions, appears increased on the right.    Cardiomediastinal silhouette  unchanged with moderate cardiomegaly and atherosclerosis thoracic aorta. Single lead ICD. Status post median sternotomy.    Moderate pulmonary vascular engorgement.          Impression:         1.  Moderate right pleural effusion, increased.  2.  Increased pulmonary vascular congestion.  3.  No other change since radiograph yesterday          Electronically signed by:  Lit Lim M.D.    2/3/2020 12:58 AM    XR Abdomen KUB [[id number]] Collected:  02/02/20 0923     Updated:  02/02/20 0926    Narrative:       DATE OF EXAM:  2/2/2020 3:55 AM     PROCEDURE:  XR ABDOMEN KUB-     INDICATIONS:  NG tube placement     COMPARISON:  No Comparisons Available     TECHNIQUE:   Single radiographic view of the abdomen was obtained.        FINDINGS:  Enteric tube terminates in the mid upper abdomen, likely in the distal  gastric body. Visualized bowel gas pattern appears grossly unremarkable  without definite bowel dilatation. No definite ectopic bowel gas is  seen. There is right convex curvature of the lumbar spine with suspected  advanced degenerative changes.        Impression:       1.Enteric tube appears to terminate in the mid upper abdomen, likely in  the distal gastric body.  2.Bowel gas pattern appears grossly unremarkable.     Electronically Signed By-DR. Jian Santiago MD On:2/2/2020 9:24 AM  This report was finalized on [id number] by DR. Jian Santiago MD.    XR Chest 1 View [[id number]] Collected:  02/02/20 0921     Updated:  02/02/20 0925    Narrative:       DATE OF EXAM:  2/2/2020 3:55 AM     PROCEDURE:  XR CHEST 1 VW-     INDICATIONS:  Intubation, chest pain, shortness of air     COMPARISON:  02/01/2020     TECHNIQUE:    Single radiographic view of the chest was obtained.     FINDINGS:  Endotracheal tube tip terminates at the level of the superior arch,  estimated to be approximately 4 cm above the mckinley.     Enteric tube extends into the upper abdomen, tip beyond the margins of  this exam.  Right IJ catheter terminates in the region of the superior  right atrium, as before. No definite pneumothorax is seen.  The heart is  enlarged. Patient is status post median sternotomy and CABG. ICD is  present. There is atherosclerosis of the aorta. There is left basilar  opacity with possible small left effusion, as before. No definite new  infiltrate or significant right effusion.       Impression:       1.Endotracheal tube tip is approximately 4 cm above the mckinley.  2.Enteric tube appears to extend into the upper abdomen, tip beyond the  margins of this exam.  3.Cardiomegaly with left effusion and left basilar opacities, as before.     Electronically Signed By-DR. Jian Santiago MD On:2/2/2020 9:23 AM  This report was finalized on [id number] by DR. Jian Santiago MD.      LAB RESULTS (LAST 7 DAYS)    CBC  Results from last 7 days   Lab Units 02/03/20  0504 02/03/20  0236 02/03/20  0212 02/02/20  0338 02/02/20 0330 01/1940   WBC 10*3/mm3 13.20* 12.60*  --  11.80*  --  9.60   RBC 10*6/mm3 4.13* 3.82*  --  4.10*  --  4.57   HEMOGLOBIN g/dL 14.9 13.6  --  14.7  --  16.1   HEMOGLOBIN, POC g/dL  --   --  13.6  --  15.9  --    HEMATOCRIT % 43.9 40.6  --  43.8  --  48.9   HEMATOCRIT POC %  --   --  40  --  47  --    MCV fL 106.2* 106.3*  --  106.7*  --  107.1*   PLATELETS 10*3/mm3 88* 85*  --  94*  --  111*       BMP  Results from last 7 days   Lab Units 02/03/20  0503 02/02/20  0339 02/02/20  0003 02/01/20  1404 02/01/20  0227 01/1940 01/28/20  0853   SODIUM mmol/L 134* 131* 131* 133* 133* 133* 136   POTASSIUM mmol/L 4.5 5.5* 4.9 5.6* 5.3* 5.0 4.5   CHLORIDE mmol/L 95* 93* 92* 92* 92* 91* 89*   CO2 mmol/L 19.0* 15.0* 21.0* 22.0  25.0 23.0 26.5   BUN mg/dL 89* 87* 92* 93* 83* 77* 58*   CREATININE mg/dL 4.71* 4.55* 4.73* 4.72* 4.34* 4.06* 3.28*   GLUCOSE mg/dL 159* 177* 156* 167* 114* 118* 188*   MAGNESIUM mg/dL  --  2.9* 2.9*  --   --   --   --    PHOSPHORUS mg/dL 7.1*  --  7.1*  --   --   --   --        CMP Results from last 7 days   Lab Units 02/03/20  0503 02/02/20  0339 02/02/20  0003 02/01/20  1653 02/01/20  1404 02/01/20  0227 01/31/20  1940/20  0853   SODIUM mmol/L 134* 131* 131*  --  133* 133* 133* 136   POTASSIUM mmol/L 4.5 5.5* 4.9  --  5.6* 5.3* 5.0 4.5   CHLORIDE mmol/L 95* 93* 92*  --  92* 92* 91* 89*   CO2 mmol/L 19.0* 15.0* 21.0*  --  22.0 25.0 23.0 26.5   BUN mg/dL 89* 87* 92*  --  93* 83* 77* 58*   CREATININE mg/dL 4.71* 4.55* 4.73*  --  4.72* 4.34* 4.06* 3.28*   GLUCOSE mg/dL 159* 177* 156*  --  167* 114* 118* 188*   ALBUMIN g/dL 2.80* 3.20*  --  3.30*  --   --  4.00  --    BILIRUBIN mg/dL  --  1.8*  --  2.1*  --   --  1.8*  --    ALK PHOS U/L  --  113  --  124*  --   --  133*  --    AST (SGOT) U/L  --  174*  --  103*  --   --  51*  --    ALT (SGPT) U/L  --  166*  --  103*  --   --  49*  --          BNP        TROPONIN  Results from last 7 days   Lab Units 02/03/20  0503   TROPONIN T ng/mL 0.439*       CoAg  Results from last 7 days   Lab Units 02/03/20  0503 02/02/20  0623 02/02/20  0338 02/02/20  0003 02/01/20  1856 02/01/20  1404 02/01/20  0713  01/31/20  1938   INR   --  1.79*  --   --   --   --  1.50*  --  1.44*   APTT seconds 62.3 56.7* 59.1* 74.6 73.3 59.6* 76.7*   < > 23.3*    < > = values in this interval not displayed.       Creatinine Clearance  Estimated Creatinine Clearance: 13.2 mL/min (A) (by C-G formula based on SCr of 4.71 mg/dL (H)).    ABG  Results from last 7 days   Lab Units 02/03/20  0409 02/03/20  0212 02/02/20  0459 02/02/20  0330 02/01/20  1814 01/31/20 2004   PH, ARTERIAL pH units 7.412 7.449 7.310* 7.278* 7.446 7.453*   PCO2, ARTERIAL mm Hg 31.9* 24.0* 41.6 34.1* 31.1* 32.0*   PO2 ART mm Hg  99.9 96.2 350.4* 75.3* 99.9 116.1*   O2 SATURATION ART % 97.9 98.0 99.9* 93.2* 98.1* 98.8*   BASE EXCESS ART mmol/L -3.3* -5.5* -5.1* -9.8* -1.6* -0.7*       Radiology  Xr Chest 1 View    Result Date: 2/3/2020  1.  Moderate right pleural effusion, increased. 2.  Increased pulmonary vascular congestion. 3.  No other change since radiograph yesterday Electronically signed by:  Lit Lim M.D.  2/3/2020 12:58 AM    Xr Chest 1 View    Result Date: 2/2/2020  1.Endotracheal tube tip is approximately 4 cm above the mckinley. 2.Enteric tube appears to extend into the upper abdomen, tip beyond the margins of this exam. 3.Cardiomegaly with left effusion and left basilar opacities, as before.  Electronically Signed By-DR. Jian Santiago MD On:2/2/2020 9:23 AM This report was finalized on [id number] by DR. Jian Santiago MD.    Xr Chest 1 View    Result Date: 2/1/2020  New right internal jugular central venous catheter terminates at the cavoatrial junction. The remainder of the examination is unchanged.  Electronically Signed By-Jim Jacobs On:2/1/2020 6:40 PM This report was finalized on [id number] by  Jim Jacobs, .    Xr Abdomen Kub    Result Date: 2/2/2020  1.Enteric tube appears to terminate in the mid upper abdomen, likely in the distal gastric body. 2.Bowel gas pattern appears grossly unremarkable.  Electronically Signed By-DR. Jian Santiago MD On:2/2/2020 9:24 AM This report was finalized on [id number] by DR. Jian Santiago MD.              EKG              I personally viewed and interpreted the patient's EKG/Telemetry data: Wide QRS tachycardia with atrial fibrillation.    ECHOCARDIOGRAM:    Results for orders placed during the hospital encounter of 01/31/20   Adult Transthoracic Echo Complete W/ Cont if Necessary Per Protocol    Narrative · The left ventricular cavity is severely dilated.  · Estimated EF = 10%.  · Left ventricular systolic function is severely decreased.  · Mild pulmonic valve  regurgitation is present.  · Mild to moderate tricuspid valve regurgitation is present.  · Moderate mitral valve regurgitation is present  · Mild aortic valve regurgitation is present.  · Left atrial cavity size is severely dilated.  · Right ventricular cavity is moderate-to-severely dilated.  · Severely reduced right ventricular systolic function noted.  · The following left ventricular wall segments are akinetic: basal   anterior, mid anterior, apical anterior, basal anterolateral, mid   anterolateral, apical lateral, basal inferolateral, mid inferolateral,   apical inferior, basal inferior, mid inferior, apical septal, basal   inferoseptal, mid inferoseptal, apex, basal anteroseptal and mid   anteroseptal.  · Left ventricular diastolic dysfunction (grade II) consistent with   pseudonormalization.              STRESS MYOVIEW:    Cardiolite (Tc-99m Sestamibi) stress test    CARDIAC CATHETERIZATION:            OTHER:         Assessment/Plan     Active Problems:    Chest pain      ////    Impression  ===  - Sepsis    -Persistent hypotension requiring multiple pressors.  Patient is on 4 pressors to maintain borderline blood pressure.    -Acute respiratory insufficiency- intubated and on the respirator.       - ischemic cardiomyopathy.       - status post single-chamber ICD 03/05/2010.  Have repositioning of the ventricular lead 07/09/2010.  Status post ICD shocks for ventricular tachycardia.  Patient is doing better with amiodarone and diuresis.  VT zone was reduced to 142 beats per minute     Status post ICD pulse generator replacement 9/6/2019.  Revision of pocket and repositioning of the pulse generator more medially and superiorly.        - status post successful ICD shock for ventricular tachycardia 09/24/2018      -history of ICD shock with successful defibrillation.  04/07/2017. Status post ICD shock  x2 for sustained ventricular tachycardia 02/05/2018.  Unsuccessful at 25 joules followed by successful 35  joules shock.Patient was asymptomatic.     Echocardiogram showed left ventricular dysfunction with ejection fraction of 40%.  Left atrial enlargement is present.  10/10/2018.  Michelle scan Cardiolite test is abnormal with inferior infarction and significant poster lateral ischemia.      -status post CABG 01/2000.    Status post stent to SVG to RCA 10/17/2018   cardiac catheterization 10/17/2018.  LV-gram not performed due to renal dysfunction.  Total LAD and RCA. .  Marginal branch has 30% disease.  Villarreal is atretic.  SVG to marginal branch is patent.  SVG to diagonal branch is patent and is filling LAD also.  SVG to PDA had 95% disease just distal to the landing site and had intervention.      - dizziness  Probably due to low blood pressure.  -improved with reduce dose of lisinopril.     - compensated congestive heart failure     - history of atrial fibrillation.  Patient has converted and was maintaining sinus rhythm.  Patient is in atrial fibrillation     -status post left iliofemoral thrombectomy and endarterectomy 04/01/2017.  Status post stent to right common iliac artery and left external iliac artery 04/04/2017 P     -dyslipidemia hypertension and history of smoking.  Renal dysfunctionBUN 17 creatinine 1.9     -status post hemorrhoidectomy and hernia repair     -status post impending inferior myocardial infarction prior to RCA stent placement 1997.  ==  Plan  ========  Acute respiratory failure-patient is intubated and is on the respirator.    Severe sepsis-significantly increased lactic acid levels.  Patient is on multiple antibiotics.    Acute on chronic renal failure  BUN 89 creatinine 4.7.    Status post CABG 1/2000.  Status post stent to SVG to RCA 10/17/2018    Severe ischemic cardiomyopathy with severe left ventricular dysfunction.    Congestive heart failure and cardiogenic shock.  Patient is on 4 pressors to maintain borderline blood pressure.    Elevated liver enzymes due to hepatic  congestion.    Multiorgan failure    Atrial fibrillation with rapid ventricle response.    Status post ICD.  Patient did not have any ICD shocks recently.    Hypertension- patient is hypotensive.    Dyslipidemia-patient was on statins at home.    History of peripheral vascular disease status post intervention.    Patient's overall condition is critical and predictably poor    Had a lengthy discussion with patient and patient's family at bedside.    Have discussed with attending nurse for coordination of care.  I have explained to them to support him at this time although his overall prognosis is predictably poor and make some decisions regarding extent of care depending on patient's progress over the next 24 to 48 hours.    Further plan will depend on patient's progress.  [[[[[[[[[[[[[      Amalia Leal MD  02/03/20  6:29 AM

## 2020-02-03 NOTE — NURSING NOTE
Pt had some improvement today, on two pressors. Pt unalel to wean off levophed. Pt making small amount of urine.

## 2020-02-03 NOTE — CONSULTS
"Name: Luis E Rahman ADMIT: 2020   : 1940  PCP: Yassine Haines MD    MRN: [id number] LOS: 0 days   AGE/SEX: 80 y.o. male  ROOM: 6/1   [facility]      Patient Care Team:  Yassine Haines MD as PCP - General  Yassine Haines MD as PCP - Family Medicine  Amalia Leal MD as PCP - Claims Attributed  Amalia Leal MD as Consulting Physician (Cardiology)  Trang West MD as Consulting Physician (Nephrology)  Chief Complaint   Patient presents with   • Chest Pain     CC:\"no pulse in right foot\"    Subjective     Inpatient Vascular Surgery Consult  Consult performed by: Allyssa Pedro PA-C  Consult ordered by: Devon Ariza MD        History of Present Illness   Critically ill 80-year-old male presented to the emergency room on 2020 with complaints of shortness of air, chest pain with nausea and vomiting.  He is currently in CVCU on the ventilator.  No family is at bedside.  RN was present upon physical exam.    We have been asked to evaluate this patient for \"pulseless right lower extremity\".    Patient has a history of left femoral endarterectomy with bilateral iliac stent placement by another vascular service per note in 2016 and history of right lower extremity nonhealing wound, which has since healed.     Patient's home meds were reviewed and he is on Plavix and statin therapy.    Review of Systems   Unable to perform ROS: Intubated       Past Medical History:   Diagnosis Date   • Atrial fibrillation (CMS/HCC)    • CHF (congestive heart failure) (CMS/HCC)    • Hyperlipidemia    • Hypertension    • Myocardial infarction (CMS/HCC)      Past Surgical History:   Procedure Laterality Date   • CARDIAC CATHETERIZATION     • CARDIAC ELECTROPHYSIOLOGY PROCEDURE N/A 2019    Procedure: ICD DC generator change;  Surgeon: Amalia Leal MD;  Location: [facility] INVASIVE LOCATION;  Service: Cardiovascular   • CARDIAC ELECTROPHYSIOLOGY PROCEDURE N/A 2019    Procedure: " Pocket Revision;  Surgeon: Amalia Leal MD;  Location: [facility] CATH INVASIVE LOCATION;  Service: Cardiovascular   • CORONARY ANGIOPLASTY WITH STENT PLACEMENT     • CORONARY ARTERY BYPASS GRAFT     • PACEMAKER REPLACEMENT       No family history on file.  Social History     Tobacco Use   • Smoking status: Current Every Day Smoker     Packs/day: 0.50     Types: Cigarettes   • Smokeless tobacco: Never Used   Substance Use Topics   • Alcohol use: No     Frequency: Never   • Drug use: Not on file     Medications Prior to Admission   Medication Sig Dispense Refill Last Dose   • albuterol sulfate HFA (VENTOLIN HFA) 108 (90 Base) MCG/ACT inhaler Inhale 1 puff Every 6 (Six) Hours As Needed for Shortness of Air.   Not Taking   • amiodarone (PACERONE) 200 MG tablet Take 200 mg by mouth Daily.      • aspirin (ASPIR-LOW) 81 MG EC tablet Take 81 mg by mouth Daily.   Taking   • atorvastatin (LIPITOR) 10 MG tablet Take 10 mg by mouth Daily.      • budesonide-formoterol (SYMBICORT) 160-4.5 MCG/ACT inhaler Inhale 2 puffs 2 (Two) Times a Day.      • bumetanide (BUMEX) 2 MG tablet Take 1 tablet by mouth 2 (Two) Times a Day for 30 days. 60 tablet 0    • carvedilol (COREG) 3.125 MG tablet Take 1 tablet by mouth 2 (Two) Times a Day for 30 days. 60 tablet 3    • clopidogrel (PLAVIX) 75 MG tablet Take 75 mg by mouth Daily.      • febuxostat (ULORIC) 40 MG tablet Take 1 tablet by mouth Daily for 30 days. 30 tablet 5    • FEROSUL 325 (65 Fe) MG tablet Take 325 mg by mouth Daily With Breakfast.   Taking   • levothyroxine (SYNTHROID, LEVOTHROID) 50 MCG tablet Take 1 tablet by mouth Daily for 30 days. 30 tablet 3    • vitamin D (ERGOCALCIFEROL) 48218 units capsule capsule Take 50,000 Units by mouth Every 14 (Fourteen) Days.   Taking       albumin human 25 g Intravenous Once   azithromycin 500 mg Intravenous Q24H   budesonide 0.5 mg Nebulization BID - RT   cefTRIAXone 1 g Intravenous Q24H   chlorhexidine 15 mL Mouth/Throat Q12H   insulin  lispro 0-7 Units Subcutaneous Q6H   ipratropium-albuterol 3 mL Nebulization Q4H - RT   midodrine 10 mg Oral Q8H   sodium bicarbonate      sodium chloride 500 mL Intravenous Once       amiodarone 0.5 mg/min Last Rate: 0.5 mg/min (02/03/20 0432)   dexmedetomidine 0.2-1.5 mcg/kg/hr Last Rate: 0.3 mcg/kg/hr (02/02/20 0424)   DOBUTamine 2.5 mcg/kg/min Last Rate: 5 mcg/kg/min (02/03/20 0432)   heparin 12 Units/kg/hr Last Rate: 11 Units/kg/hr (02/03/20 0433)   midazolam 1-10 mg/hr Last Rate: 3 mg/hr (02/03/20 0448)   norepinephrine 0.02-0.5 mcg/kg/min Last Rate: 0.2 mcg/kg/min (02/03/20 0900)   phenylephrine 0.5-6 mcg/kg/min Last Rate: 4 mcg/kg/min (02/03/20 0433)   sodium chloride 100 mL/hr Last Rate: 100 mL/hr (02/03/20 0433)   vasopressin 0.01-0.03 Units/min Last Rate: 0.03 Units/min (02/03/20 0434)     dextrose  •  dextrose  •  fentanyl  •  glucagon (human recombinant)  •  heparin  •  heparin  •  insulin lispro **AND** insulin lispro  •  ipratropium-albuterol  •  midazolam  •  ondansetron  •  sodium chloride  •  sodium chloride  Patient has no known allergies.    Objective     Physical Exam:  Physical Exam   Constitutional: He appears well-developed and well-nourished.   HENT:   Head: Normocephalic and atraumatic.   Eyes: Pupils are equal, round, and reactive to light.   Neck: Normal range of motion. No tracheal deviation present.   Cardiovascular: Normal rate and regular rhythm.   Doppler signal present in right radial and brachial artery.  Palpable left radial artery.  Palpable femoral pulse bilaterally.  Doppler signal present in left DP and PT.  Doppler signal present in right PT. unable to elicit in DP.   Pulmonary/Chest: Effort normal. No respiratory distress.   Abdominal: Soft. He exhibits no mass. There is no tenderness. There is no guarding.   Musculoskeletal: He exhibits no edema.   Neurological: He is alert.   Skin: Skin is warm and dry.   Bilateral feet with rubor appearance.    Both feet equally feel cool  to touch.   Upper extremities appear dark in color  A-line placed at wrist in right upper extremity.  Well-healed scar in left groin from endarterectomy.       Vital Signs and Labs:  Vital Signs Patient Vitals for the past 24 hrs:   BP Temp Temp src Pulse Resp SpO2 Height Weight   02/03/20 0803 -- 98.3 °F (36.8 °C) -- -- -- -- -- --   02/03/20 0704 -- -- -- 108 -- -- -- --   02/03/20 0701 -- -- -- 111 20 98 % -- --   02/03/20 0645 -- -- -- 108 -- -- -- --   02/03/20 0630 -- -- -- 110 -- -- -- --   02/03/20 0615 -- -- -- 113 -- -- -- --   02/03/20 0600 -- -- -- 113 -- -- -- 77.3 kg (170 lb 6.6 oz)   02/03/20 0545 -- -- -- 112 -- -- -- --   02/03/20 0530 -- -- -- 112 -- -- -- --   02/03/20 0515 -- -- -- 119 -- -- -- --   02/03/20 0500 -- -- -- 109 -- -- -- --   02/03/20 0445 -- -- -- (!) 121 -- (!) 80 % -- --   02/03/20 0430 -- -- -- 120 -- 91 % -- --   02/03/20 0415 -- -- -- 116 -- (!) 75 % -- --   02/03/20 0400 -- -- -- 111 -- 99 % -- --   02/03/20 0345 -- -- -- 120 -- (!) 87 % -- --   02/03/20 0330 -- -- -- 112 -- 94 % -- --   02/03/20 0315 -- -- -- 112 -- -- -- --   02/03/20 0304 -- -- -- 112 20 92 % -- --   02/03/20 0301 -- -- -- 114 20 90 % -- --   02/03/20 0300 -- -- -- 113 -- -- -- --   02/03/20 0245 -- -- -- 115 -- -- -- --   02/03/20 0230 -- -- -- (!) 121 -- -- -- --   02/03/20 0215 -- -- -- 110 -- -- -- --   02/03/20 0200 -- -- -- 113 -- 90 % -- --   02/03/20 0145 -- -- -- 112 -- -- -- --   02/03/20 0130 -- -- -- (!) 125 -- 91 % -- --   02/03/20 0115 -- -- -- 109 -- 91 % -- --   02/03/20 0100 -- -- -- 115 -- -- -- --   02/03/20 0045 -- -- -- 111 -- -- -- --   02/03/20 0030 -- -- -- 112 -- 100 % -- --   02/03/20 0015 -- 97.7 °F (36.5 °C) Oral 112 -- 100 % -- --   02/03/20 0000 -- -- -- 115 -- 100 % -- --   02/02/20 2345 -- -- -- 110 -- 100 % -- --   02/02/20 2330 -- -- -- 116 -- -- -- --   02/02/20 2319 -- -- -- 120 20 100 % -- --   02/02/20 2315 -- -- -- 113 -- 100 % -- --   02/02/20 2314 -- -- -- (!) 128  "20 95 % -- --   02/02/20 2300 -- -- -- 116 -- -- -- --   02/02/20 2200 -- -- -- (!) 123 -- 94 % -- --   02/02/20 2145 -- -- -- 114 -- (!) 72 % -- --   02/02/20 2130 -- -- -- 113 -- 100 % -- --   02/02/20 2115 -- -- -- 115 -- 97 % -- --   02/02/20 2100 -- -- -- 115 -- 90 % -- --   02/02/20 2015 -- -- -- 118 -- 100 % -- --   02/02/20 2000 -- 98.1 °F (36.7 °C) Axillary 112 -- -- -- --   02/02/20 1945 -- -- -- 112 -- -- -- --   02/02/20 1930 -- -- -- 114 -- -- -- --   02/02/20 1915 -- -- -- 105 -- 100 % -- --   02/02/20 1900 -- -- -- 111 -- -- -- --   02/02/20 1845 -- -- -- 100 20 100 % -- --   02/02/20 1839 -- -- -- 116 20 96 % -- --   02/02/20 1830 -- -- -- 108 -- -- -- --   02/02/20 1815 -- -- -- 113 -- (!) 86 % -- --   02/02/20 1800 -- -- -- 106 -- -- -- --   02/02/20 1730 -- -- -- 114 -- (!) 89 % -- --   02/02/20 1700 -- -- -- 115 -- 94 % -- --   02/02/20 1630 -- -- -- 116 -- 95 % -- --   02/02/20 1627 -- 97.9 °F (36.6 °C) -- -- -- -- -- --   02/02/20 1600 -- -- -- 111 -- 93 % -- --   02/02/20 1530 -- -- -- 114 -- 94 % -- --   02/02/20 1500 -- -- -- 117 -- 92 % -- --   02/02/20 1449 -- -- -- 119 -- 98 % -- --   02/02/20 1446 -- -- -- (!) 123 20 97 % -- --   02/02/20 1430 -- -- -- 111 -- 100 % -- --   02/02/20 1400 -- -- -- 119 -- -- -- --   02/02/20 1330 -- -- -- 115 -- 99 % -- --   02/02/20 1300 -- -- -- 114 -- 99 % -- --   02/02/20 1250 -- -- -- 111 -- 100 % -- --   02/02/20 1230 -- -- -- 108 -- -- -- --   02/02/20 1220 -- -- -- 112 -- -- -- --   02/02/20 1217 91/44 -- -- -- -- -- 180.3 cm (71\") 68 kg (150 lb)   02/02/20 1207 -- 97.3 °F (36.3 °C) -- -- -- -- -- --   02/02/20 1150 -- -- -- 114 -- -- -- --   02/02/20 1120 -- -- -- 106 -- -- -- --   02/02/20 1114 -- -- -- 106 -- -- -- --   02/02/20 1111 -- -- -- 104 20 100 % -- --   02/02/20 1030 -- -- -- 109 -- -- -- --   02/02/20 1000 -- -- -- 108 -- 92 % -- --   02/02/20 0950 -- -- -- 100 -- -- -- --     I/O:  I/O last 3 completed shifts:  In: 6781 [I.V.:6695; " Other:41; NG/GT:45]  Out: 330 [Urine:330]  BMI:  Body mass index is 23.77 kg/m².    CBC    Results from last 7 days   Lab Units 02/03/20  0504 02/03/20 0236 02/03/20 0212 02/02/20 0338 02/02/20 0330 01/1940   WBC 10*3/mm3 13.20* 12.60*  --  11.80*  --  9.60   HEMOGLOBIN g/dL 14.9 13.6  --  14.7  --  16.1   HEMOGLOBIN, POC g/dL  --   --  13.6  --  15.9  --    PLATELETS 10*3/mm3 88* 85*  --  94*  --  111*     BMP   Results from last 7 days   Lab Units 02/03/20  0503 02/02/20  0339 02/02/20  0003 02/01/20  1404 02/01/20 0227 01/1940 01/28/20  0853   SODIUM mmol/L 134* 131* 131* 133* 133* 133* 136   POTASSIUM mmol/L 4.5 5.5* 4.9 5.6* 5.3* 5.0 4.5   CHLORIDE mmol/L 95* 93* 92* 92* 92* 91* 89*   CO2 mmol/L 19.0* 15.0* 21.0* 22.0 25.0 23.0 26.5   BUN mg/dL 89* 87* 92* 93* 83* 77* 58*   CREATININE mg/dL 4.71* 4.55* 4.73* 4.72* 4.34* 4.06* 3.28*   GLUCOSE mg/dL 159* 177* 156* 167* 114* 118* 188*   MAGNESIUM mg/dL  --  2.9* 2.9*  --   --   --   --    PHOSPHORUS mg/dL 7.1*  --  7.1*  --   --   --   --      Coag   Results from last 7 days   Lab Units 02/03/20  0503 02/02/20  0623 02/02/20 0338 02/02/20 0003 02/01/20  1856 02/01/20  1404 02/01/20 0713 01/1940   INR   --  1.79*  --   --   --   --  1.50*  --  1.44*   APTT seconds 62.3 56.7* 59.1* 74.6 73.3 59.6* 76.7*   < > 23.3*    < > = values in this interval not displayed.     HbA1C No results found for: HGBA1C  Radiology(recent) Xr Chest 1 View    Result Date: 2/3/2020  1.  Moderate right pleural effusion, increased. 2.  Increased pulmonary vascular congestion. 3.  No other change since radiograph yesterday Electronically signed by:  Lit Lim M.D.  2/3/2020 12:58 AM    Xr Chest 1 View    Result Date: 2/2/2020  1.Endotracheal tube tip is approximately 4 cm above the mckinley. 2.Enteric tube appears to extend into the upper abdomen, tip beyond the margins of this exam. 3.Cardiomegaly with left effusion and left basilar opacities, as before.   Electronically Signed By-DR. Jian Santiago MD On:2/2/2020 9:23 AM This report was finalized on [id number] by DR. Jian Santiago MD.    Xr Chest 1 View    Result Date: 2/1/2020  New right internal jugular central venous catheter terminates at the cavoatrial junction. The remainder of the examination is unchanged.  Electronically Signed By-Jim Jacobs On:2/1/2020 6:40 PM This report was finalized on [id number] by  Jim Jacobs, .    Xr Abdomen Kub    Result Date: 2/2/2020  1.Enteric tube appears to terminate in the mid upper abdomen, likely in the distal gastric body. 2.Bowel gas pattern appears grossly unremarkable.  Electronically Signed By-DR. Jian Santiago MD On:2/2/2020 9:24 AM This report was finalized on [id number] by DR. Jian Santiago MD.      Active Hospital Problems    Diagnosis  POA   • Chest pain [R07.9]  Yes      Resolved Hospital Problems   No resolved problems to display.       [id number]    Assessment/Plan       Chest pain      80 y.o. male critically ill patient in the CVICU who we have been asked to evaluate for pulseless right lower extremity.     Upon physical exam, patient has Doppler signals present in right PT and left DP and PT.  He has palpable pulses in bilateral femoral arteries as well as left radial artery and Doppler signal present in right radial artery.    Patient is currently on 4 pressors and is intubated.  Echo performed on 2/2/2020 demonstrates ejection fraction of 10%.    From a vascular standpoint, there is no invention needed for this critically ill patient. Please feel free to contact us if issue should arise.     I discussed the patients findings and my recommendations with nursing staff.    Allyssa Martin PA-C  02/03/20  9:00 AM    Please call my office with any question: [phone]

## 2020-02-03 NOTE — PROGRESS NOTES
Nutrition Services    Patient Name:  Luis E Rahman  YOB: 1940  MRN: [id number]  Admit Date:  1/31/2020    Progress Note:    TF check on. Per discussion in rounds, recommend continuing trickle feed at this time given hypotension on multiple pressors. Hold TF if worsening hypotension. Labs reviewed below. Noted lactate this AM was 4.1.    EN Prescription: Novasource Renal at 20 mL/hr and 10 mL/hr H2O flush    RDN will continue with follow up as planned.    Results from last 7 days   Lab Units 02/03/20  0503 02/02/20  0339 02/02/20  0003 02/01/20  1653  01/1940   SODIUM mmol/L 134* 131* 131*  --    < > 133*   POTASSIUM mmol/L 4.5 5.5* 4.9  --    < > 5.0   CHLORIDE mmol/L 95* 93* 92*  --    < > 91*   CO2 mmol/L 19.0* 15.0* 21.0*  --    < > 23.0   BUN mg/dL 89* 87* 92*  --    < > 77*   CREATININE mg/dL 4.71* 4.55* 4.73*  --    < > 4.06*   CALCIUM mg/dL 8.6 8.4* 8.7  --    < > 10.7*   BILIRUBIN mg/dL  --  1.8*  --  2.1*  --  1.8*   ALK PHOS U/L  --  113  --  124*  --  133*   ALT (SGPT) U/L  --  166*  --  103*  --  49*   AST (SGOT) U/L  --  174*  --  103*  --  51*   GLUCOSE mg/dL 159* 177* 156*  --    < > 118*    < > = values in this interval not displayed.       Results from last 7 days   Lab Units 02/03/20  0504 02/03/20  0503  02/02/20  0339  02/02/20  0003   MAGNESIUM mg/dL  --   --   --  2.9*  --  2.9*   PHOSPHORUS mg/dL  --  7.1*  --   --   --  7.1*   HEMOGLOBIN g/dL 14.9  --    < >  --    < >  --    HEMOGLOBIN, POC   --   --    < >  --    < >  --    HEMATOCRIT % 43.9  --    < >  --    < >  --    HEMATOCRIT POC   --   --    < >  --    < >  --     < > = values in this interval not displayed.            Electronically signed by:  Jennifer Santana RD  02/03/20 2:08 PM

## 2020-02-03 NOTE — PROGRESS NOTES
"NEPHROLOGY PROGRESS NOTE------[facility]    [facility]  [phone]  Tony Cordero MD      Patient Care Team:  Yassine Haines MD as PCP - General  Yassine Haines MD as PCP - Family Medicine  Amalia Leal MD as PCP - Claims Attributed  Amalia Leal MD as Consulting Physician (Cardiology)  Trang West MD as Consulting Physician (Nephrology)      Provider:  Tony Cordero MD  Patient Name: Luis E Rahman  :  1940    SUBJECTIVE:  F/u MASOUD/CKD  Intubated now, multiple pressors.      Medication:    albumin human 25 g Intravenous Once   azithromycin 500 mg Intravenous Q24H   budesonide 0.5 mg Nebulization BID - RT   cefTRIAXone 1 g Intravenous Q24H   chlorhexidine 15 mL Mouth/Throat Q12H   insulin lispro 0-7 Units Subcutaneous Q6H   ipratropium-albuterol 3 mL Nebulization Q4H - RT   midodrine 10 mg Oral Q8H   sodium bicarbonate          amiodarone 0.5 mg/min Last Rate: 0.5 mg/min (20)   dexmedetomidine 0.2-1.5 mcg/kg/hr Last Rate: 0.3 mcg/kg/hr (20)   DOBUTamine 2.5 mcg/kg/min Last Rate: 5 mcg/kg/min (20)   heparin 12 Units/kg/hr Last Rate: 11 Units/kg/hr (20)   midazolam 1-10 mg/hr Last Rate: 3 mg/hr (20)   norepinephrine 0.02-0.5 mcg/kg/min Last Rate: 0.5 mcg/kg/min (20)   phenylephrine 0.5-6 mcg/kg/min Last Rate: 4 mcg/kg/min (20)   sodium chloride 100 mL/hr Last Rate: 100 mL/hr (20)   vasopressin 0.01-0.03 Units/min Last Rate: 0.03 Units/min (20)       OBJECTIVE    Vital Sign Min/Max for last 24 hours  Temp  Min: 97.3 °F (36.3 °C)  Max: 98.1 °F (36.7 °C)   BP  Min: 91/44  Max: 91/44   Pulse  Min: 100  Max: 128   Resp  Min: 20  Max: 20   SpO2  Min: 72 %  Max: 100 %   No data recorded   Weight  Min: 68 kg (150 lb)  Max: 77.3 kg (170 lb 6.6 oz)     Flowsheet Rows      First Filed Value   Admission Height  180.3 cm (71\") Documented at 2020 "   Admission Weight  73.5 kg (162 lb) Documented at 01/31/2020 1921          I/O this shift:  In: 3571 [I.V.:3485; Other:41; NG/GT:45]  Out: 30 [Urine:30]  I/O last 3 completed shifts:  In: 3210 [I.V.:3210]  Out: 300 [Urine:300]    Physical Exam:  General Appearance: intubated/ sedated  Head: normocephalic, without obvious abnormality and atraumatic  Eyes: conjunctivae and sclerae normal and no icterus  Neck: supple and no JVD  Lungs: clear to auscultation and respirations regular  Heart: regular rhythm & normal rate and normal S1, S2  Chest: Wall no abnormalities observed  Abdomen: normal bowel sounds and soft non-tender  Extremities: traceedema  Skin: no bleeding  Neurologic: sedated    Labs:    WBC WBC   Date Value Ref Range Status   02/03/2020 13.20 (H) 3.40 - 10.80 10*3/mm3 Preliminary   02/03/2020 12.60 (H) 3.40 - 10.80 10*3/mm3 Final   02/02/2020 11.80 (H) 3.40 - 10.80 10*3/mm3 Final   01/31/2020 9.60 3.40 - 10.80 10*3/mm3 Final      HGB Hemoglobin   Date Value Ref Range Status   02/03/2020 14.9 13.0 - 17.7 g/dL Preliminary   02/03/2020 13.6 13.0 - 17.7 g/dL Final   02/03/2020 13.6 12.0 - 17.0 g/dL Final   02/02/2020 14.7 13.0 - 17.7 g/dL Final   02/02/2020 15.9 12.0 - 17.0 g/dL Final   01/31/2020 16.1 13.0 - 17.7 g/dL Final      HCT Hematocrit   Date Value Ref Range Status   02/03/2020 43.9 37.5 - 51.0 % Preliminary   02/03/2020 40.6 37.5 - 51.0 % Final   02/03/2020 40 38 - 51 % Final   02/02/2020 43.8 37.5 - 51.0 % Final   02/02/2020 47 38 - 51 % Final   01/31/2020 48.9 37.5 - 51.0 % Final      Platlets No results found for: LABPLAT   MCV MCV   Date Value Ref Range Status   02/03/2020 106.2 (H) 79.0 - 97.0 fL Preliminary   02/03/2020 106.3 (H) 79.0 - 97.0 fL Final   02/02/2020 106.7 (H) 79.0 - 97.0 fL Final   01/31/2020 107.1 (H) 79.0 - 97.0 fL Final          Sodium Sodium   Date Value Ref Range Status   02/03/2020 134 (L) 136 - 145 mmol/L Final   02/02/2020 131 (L) 136 - 145 mmol/L Final   02/02/2020 131  (L) 136 - 145 mmol/L Final   02/01/2020 133 (L) 136 - 145 mmol/L Final   02/01/2020 133 (L) 136 - 145 mmol/L Final   01/31/2020 133 (L) 136 - 145 mmol/L Final      Potassium Potassium   Date Value Ref Range Status   02/03/2020 4.5 3.5 - 5.2 mmol/L Final   02/02/2020 5.5 (H) 3.5 - 5.2 mmol/L Final   02/02/2020 4.9 3.5 - 5.2 mmol/L Final   02/01/2020 5.6 (H) 3.5 - 5.2 mmol/L Final   02/01/2020 5.3 (H) 3.5 - 5.2 mmol/L Final   01/31/2020 5.0 3.5 - 5.2 mmol/L Final      Chloride Chloride   Date Value Ref Range Status   02/03/2020 95 (L) 98 - 107 mmol/L Final   02/02/2020 93 (L) 98 - 107 mmol/L Final   02/02/2020 92 (L) 98 - 107 mmol/L Final   02/01/2020 92 (L) 98 - 107 mmol/L Final   02/01/2020 92 (L) 98 - 107 mmol/L Final   01/31/2020 91 (L) 98 - 107 mmol/L Final      CO2 CO2   Date Value Ref Range Status   02/03/2020 19.0 (L) 22.0 - 29.0 mmol/L Final   02/02/2020 15.0 (L) 22.0 - 29.0 mmol/L Final     Comment:     Result checked    02/02/2020 21.0 (L) 22.0 - 29.0 mmol/L Final   02/01/2020 22.0 22.0 - 29.0 mmol/L Final   02/01/2020 25.0 22.0 - 29.0 mmol/L Final   01/31/2020 23.0 22.0 - 29.0 mmol/L Final      BUN BUN   Date Value Ref Range Status   02/03/2020 89 (H) 8 - 23 mg/dL Final   02/02/2020 87 (H) 8 - 23 mg/dL Final   02/02/2020 92 (H) 8 - 23 mg/dL Final   02/01/2020 93 (H) 8 - 23 mg/dL Final   02/01/2020 83 (H) 8 - 23 mg/dL Final   01/31/2020 77 (H) 8 - 23 mg/dL Final      Creatinine Creatinine   Date Value Ref Range Status   02/03/2020 4.71 (H) 0.76 - 1.27 mg/dL Final   02/02/2020 4.55 (H) 0.76 - 1.27 mg/dL Final   02/02/2020 4.73 (H) 0.76 - 1.27 mg/dL Final   02/01/2020 4.72 (H) 0.76 - 1.27 mg/dL Final   02/01/2020 4.34 (H) 0.76 - 1.27 mg/dL Final   01/31/2020 4.06 (H) 0.76 - 1.27 mg/dL Final      Calcium Calcium   Date Value Ref Range Status   02/03/2020 8.6 8.6 - 10.5 mg/dL Final   02/02/2020 8.4 (L) 8.6 - 10.5 mg/dL Final   02/02/2020 8.7 8.6 - 10.5 mg/dL Final   02/01/2020 9.3 8.6 - 10.5 mg/dL Final    02/01/2020 10.1 8.6 - 10.5 mg/dL Final   01/31/2020 10.7 (H) 8.6 - 10.5 mg/dL Final      PO4 No components found for: PO4   Albumin Albumin   Date Value Ref Range Status   02/03/2020 2.80 (L) 3.50 - 5.20 g/dL Final   02/02/2020 3.20 (L) 3.50 - 5.20 g/dL Final   02/01/2020 3.30 (L) 3.50 - 5.20 g/dL Final   01/31/2020 4.00 3.50 - 5.20 g/dL Final      Magnesium Magnesium   Date Value Ref Range Status   02/02/2020 2.9 (H) 1.6 - 2.4 mg/dL Final   02/02/2020 2.9 (H) 1.6 - 2.4 mg/dL Final      Uric Acid No components found for: URIC ACID     Imaging Results (Last 72 Hours)     Procedure Component Value Units Date/Time    XR Chest 1 View [[id number]] Collected:  02/03/20 0056     Updated:  02/03/20 0310    Narrative:       EXAMINATION: XR CHEST 1 VW    DATE: 2/3/2020 2:24 AM    INDICATION:  Hypotension;    COMPARISON:  2/2/2020    FINDINGS:  Endotracheal tube and small right jugular venous catheter unchanged. Esophogastric tube tip past GE junction and off image redemonstrated.    Moderate bilateral pleural effusions, appears increased on the right.    Cardiomediastinal silhouette  unchanged with moderate cardiomegaly and atherosclerosis thoracic aorta. Single lead ICD. Status post median sternotomy.    Moderate pulmonary vascular engorgement.          Impression:         1.  Moderate right pleural effusion, increased.  2.  Increased pulmonary vascular congestion.  3.  No other change since radiograph yesterday          Electronically signed by:  Lit Lim M.D.    2/3/2020 12:58 AM    XR Abdomen KUB [[id number]] Collected:  02/02/20 0923     Updated:  02/02/20 0926    Narrative:       DATE OF EXAM:  2/2/2020 3:55 AM     PROCEDURE:  XR ABDOMEN KUB-     INDICATIONS:  NG tube placement     COMPARISON:  No Comparisons Available     TECHNIQUE:   Single radiographic view of the abdomen was obtained.        FINDINGS:  Enteric tube terminates in the mid upper abdomen, likely in the distal  gastric body. Visualized bowel gas  pattern appears grossly unremarkable  without definite bowel dilatation. No definite ectopic bowel gas is  seen. There is right convex curvature of the lumbar spine with suspected  advanced degenerative changes.        Impression:       1.Enteric tube appears to terminate in the mid upper abdomen, likely in  the distal gastric body.  2.Bowel gas pattern appears grossly unremarkable.     Electronically Signed By-DR. Jian Santiago MD On:2/2/2020 9:24 AM  This report was finalized on [id number] by DR. Jian Santiago MD.    XR Chest 1 View [[id number]] Collected:  02/02/20 0921     Updated:  02/02/20 0925    Narrative:       DATE OF EXAM:  2/2/2020 3:55 AM     PROCEDURE:  XR CHEST 1 VW-     INDICATIONS:  Intubation, chest pain, shortness of air     COMPARISON:  02/01/2020     TECHNIQUE:   Single radiographic view of the chest was obtained.     FINDINGS:  Endotracheal tube tip terminates at the level of the superior arch,  estimated to be approximately 4 cm above the mckinley.     Enteric tube extends into the upper abdomen, tip beyond the margins of  this exam.  Right IJ catheter terminates in the region of the superior  right atrium, as before. No definite pneumothorax is seen.  The heart is  enlarged. Patient is status post median sternotomy and CABG. ICD is  present. There is atherosclerosis of the aorta. There is left basilar  opacity with possible small left effusion, as before. No definite new  infiltrate or significant right effusion.       Impression:       1.Endotracheal tube tip is approximately 4 cm above the mckinley.  2.Enteric tube appears to extend into the upper abdomen, tip beyond the  margins of this exam.  3.Cardiomegaly with left effusion and left basilar opacities, as before.     Electronically Signed By-DR. Jian Santiago MD On:2/2/2020 9:23 AM  This report was finalized on [id number] by DR. Jian Santiago MD.    XR Chest 1 View [[id number]] Collected:  02/01/20 1840     Updated:  02/01/20 1842     Narrative:       Examination: XR CHEST 1 VW-     Date of Exam: 2/1/2020 6:09 PM     Indication: line placement; R07.9-Chest pain, unspecified;  R06.00-Dyspnea, unspecified; I50.9-Heart failure, unspecified;  N17.9-Acute kidney failure, unspecified.     Comparison: 01/31/2020.     Technique: Single radiographic view of the chest was obtained.     Findings:  There is a new right internal jugular central venous catheter  terminating at the cavoatrial junction. There is moderate cardiomegaly.  There is a stable small left pleural effusion with left basilar airspace  disease/atelectasis. Stable left-sided ICD and evidence of prior median  sternotomy and CABG. The right lung remains clear. No evidence of  pneumothorax. No acute osseous abnormalities.       Impression:       New right internal jugular central venous catheter terminates at the  cavoatrial junction. The remainder of the examination is unchanged.     Electronically Signed By-Jim Jacobs On:2/1/2020 6:40 PM  This report was finalized on [id number] by  Jim Jacobs, .    XR Chest 1 View [[id number]] Collected:  01/31/20 2014     Updated:  01/31/20 2030    Narrative:       DATE OF EXAM:  1/31/2020 8:08 PM     PROCEDURE:  XR CHEST 1 VW-     INDICATIONS:  Chest pain, tobacco abuse, shortness of breath, heart disease.      COMPARISON:  01/23/2020.     TECHNIQUE:   Single radiographic view of the chest was obtained.     FINDINGS:  The heart is enlarged. There are postsurgical changes apparent which  includes a left-sided transvenous pacemaker/intracardiac defibrillator.  There is abnormal medial left basilar consolidation suspicious for  chronic scarring or atelectasis. The patient has a large hiatal hernia.  The right lung is clear. There are no pleural effusions.  There are  chronic age-related changes involving the bony thorax and thoracic  aorta.       Impression:          1. Cardiomegaly.  2. Abnormal medial left basilar consolidation suspicious for  either  chronic scarring or atelectasis. There is also a hiatal hernia.     Electronically Signed By-Adrian Morris On:1/31/2020 8:27 PM  This report was finalized on [id number] by  Adrian Morris, .          Results for orders placed during the hospital encounter of 01/31/20   XR Chest 1 View    Narrative EXAMINATION: XR CHEST 1 VW    DATE: 2/3/2020 2:24 AM    INDICATION:  Hypotension;    COMPARISON:  2/2/2020    FINDINGS:  Endotracheal tube and small right jugular venous catheter unchanged. Esophogastric tube tip past GE junction and off image redemonstrated.    Moderate bilateral pleural effusions, appears increased on the right.    Cardiomediastinal silhouette  unchanged with moderate cardiomegaly and atherosclerosis thoracic aorta. Single lead ICD. Status post median sternotomy.    Moderate pulmonary vascular engorgement.          Impression 1.  Moderate right pleural effusion, increased.  2.  Increased pulmonary vascular congestion.  3.  No other change since radiograph yesterday          Electronically signed by:  Lit Lim M.D.    2/3/2020 12:58 AM   XR Abdomen KUB    Narrative DATE OF EXAM:  2/2/2020 3:55 AM     PROCEDURE:  XR ABDOMEN KUB-     INDICATIONS:  NG tube placement     COMPARISON:  No Comparisons Available     TECHNIQUE:   Single radiographic view of the abdomen was obtained.        FINDINGS:  Enteric tube terminates in the mid upper abdomen, likely in the distal  gastric body. Visualized bowel gas pattern appears grossly unremarkable  without definite bowel dilatation. No definite ectopic bowel gas is  seen. There is right convex curvature of the lumbar spine with suspected  advanced degenerative changes.        Impression 1.Enteric tube appears to terminate in the mid upper abdomen, likely in  the distal gastric body.  2.Bowel gas pattern appears grossly unremarkable.     Electronically Signed By-DR. Jian Santiago MD On:2/2/2020 9:24 AM  This report was finalized on [id number] by   Jian Santiago MD.   XR Chest 1 View    Narrative DATE OF EXAM:  2/2/2020 3:55 AM     PROCEDURE:  XR CHEST 1 VW-     INDICATIONS:  Intubation, chest pain, shortness of air     COMPARISON:  02/01/2020     TECHNIQUE:   Single radiographic view of the chest was obtained.     FINDINGS:  Endotracheal tube tip terminates at the level of the superior arch,  estimated to be approximately 4 cm above the mckinley.     Enteric tube extends into the upper abdomen, tip beyond the margins of  this exam.  Right IJ catheter terminates in the region of the superior  right atrium, as before. No definite pneumothorax is seen.  The heart is  enlarged. Patient is status post median sternotomy and CABG. ICD is  present. There is atherosclerosis of the aorta. There is left basilar  opacity with possible small left effusion, as before. No definite new  infiltrate or significant right effusion.       Impression 1.Endotracheal tube tip is approximately 4 cm above the mckinley.  2.Enteric tube appears to extend into the upper abdomen, tip beyond the  margins of this exam.  3.Cardiomegaly with left effusion and left basilar opacities, as before.     Electronically Signed By-DR. Jian Santiago MD On:2/2/2020 9:23 AM  This report was finalized on [id number] by DR. Jian Santiago MD.              ASSESSMENT / PLAN      Chest pain    · MASOUD--ATN due to hypoyension, dehydration, diuresis  · CKD4  · Hyperkalemia--low K diet, No ACEi/ARBs  · HTN--BP low. Continue midodrine  · Hx CAD/ angina--per cards. Would hold off cath until renal function improves  · Ischemic cardiomyopathy  · Sepsis with shock     Oliguric ATN  Continue supportive care  May need dialysis  Prognosis poor      Tony Cordero MD  [facility]  [phone]  02/03/20  6:54 AM

## 2020-02-03 NOTE — NURSING NOTE
Informed Dr. Ariza of maximum pressor support and low blood pressure. To at bedside talking to family.

## 2020-02-03 NOTE — CONSULTS
Palliative Care Consultation    Patient Code Status    Code Status and Medical Interventions:   Ordered at: 02/02/20 1213     Level Of Support Discussed With:    Next of Kin (If No Surrogate)     Code Status:    No CPR     Medical Interventions (Level of Support Prior to Arrest):    Full       Requesting clinician:  Consulting Physician(s)     Provider Relationship Specialty    Devon Ariza MD Consulting Physician Pulmonary Disease    Amalia Leal MD Consulting Physician Cardiology    Trang West MD Consulting Physician Nephrology    Yassine Haines MD Consulting Physician Family Medicine    Linda Shirley MD Consulting Physician Cardiology    Marcel Santiago MD Consulting Physician Vascular Surgery        Reason for consult: Consultation for clarification of goals of care and code status.      Chief Complaint    Chest pain  Shortness of air    History of Present Illness    Luis E Rahman is a 80 y.o. male who presented to the ED on 1/31/20 with complaints of chest pain of unknown duration (poor historian), shortness of air and noted to be hypotensive. Recent admission with discharge noted 1/25/20. Significant history of severe cardiomyopathy with ICD placed 2010 and battery change 9/2019.  Patient was placed in the CVCU with cardiogenic shock. Cardiology consulted with noted creatinine 4 on admission and nephrology consulted as well.  Patient developed respiratory failure and required intubation with ventilator support 2/2/20 and was on 4 pressors, amiodarone and dobutamine.  Vascular surgery consulted 2/3 due to pulseless right leg with history of peripheral vascular disease and previous left femoral endarterectomy with stent placed prior to 2016. No surgical intervention planned due to patients instability  Further evaluation per cardiology noted LVEF 10%  Long discussion with patients brother, 2 sisters, niece and  (per family request) outside of room. They  "verified patient is not  and Lianne does not live with the patient but is his \"girlfriend\". Sisters verify that patient has been clear he would agree to intubation but only for \"2 days\" but sisters noted they all agreed to give him a week on the vent before making any further decision.  Also discussed patients poor heart function, his leg ischemia and vascular surgery noted no surgical interventions. They were in agreement to make him a DNR but still want aggressive treatment / then also asking about contacting [employer] for information regarding financial support for cremation.  Family appear to have a good understanding of patient poor prognosis.  Emotional support provided.  Will continue to follow for support and further discussion regarding termination of life support should that still need to be discussed after 5 more days.    Advanced Care Planning    Advanced Directives: Patient does not have advance directive  Health Care Directive on file: No  Health Care Surrogate:      Comments: family confirmed the patient has no advance directives    Assessment/Plan   Cardiogenic shock / 4 pressors for hypotension (digna, levo, vasopressin, dobutamine 5 mcg,  Amiodarone and  Heparin gtt  Acute respiratory failure / intubated 2/2/20  Ischemic cardiomyopathy with EF 10%  ICD placed 3/5/10 ; generator replaced 9/6/19  Chest pain / history of PTCA with stents / CABG 1/2000  Multiorgan failure / creatinine 4.7 (family states NO to dialysis)  Ischemic right lower extremity / cool to touch and decreased pulses  Thrombocytopenia / platelets @ 88   Active Problems:    Chest pain      Plan    Allow patient and family the 7 days of intubation, verbalized understanding poor prognosis and do not want to prolong patients life.    Review of Systems   Unable to perform ROS: Intubated         Past Medical History:   Diagnosis Date   • Atrial fibrillation (CMS/[facility])    • CHF (congestive heart failure) (CMS/[facility])    • " Hyperlipidemia    • Hypertension    • Myocardial infarction (CMS/[facility])      Past Surgical History:   Procedure Laterality Date   • CARDIAC CATHETERIZATION     • CARDIAC ELECTROPHYSIOLOGY PROCEDURE N/A 9/6/2019    Procedure: ICD DC generator change;  Surgeon: Amalia Leal MD;  Location: [facility] CATH INVASIVE LOCATION;  Service: Cardiovascular   • CARDIAC ELECTROPHYSIOLOGY PROCEDURE N/A 9/6/2019    Procedure: Pocket Revision;  Surgeon: Amalia Leal MD;  Location: [facility] CATH INVASIVE LOCATION;  Service: Cardiovascular   • CORONARY ANGIOPLASTY WITH STENT PLACEMENT     • CORONARY ARTERY BYPASS GRAFT     • PACEMAKER REPLACEMENT       No family history on file.  Social History     Tobacco Use   • Smoking status: Current Every Day Smoker     Packs/day: 0.50     Types: Cigarettes   • Smokeless tobacco: Never Used   Substance Use Topics   • Alcohol use: No     Frequency: Never   • Drug use: Not on file     Medications Prior to Admission   Medication Sig Dispense Refill Last Dose   • albuterol sulfate HFA (VENTOLIN HFA) 108 (90 Base) MCG/ACT inhaler Inhale 1 puff Every 6 (Six) Hours As Needed for Shortness of Air.   Not Taking   • amiodarone (PACERONE) 200 MG tablet Take 200 mg by mouth Daily.      • aspirin (ASPIR-LOW) 81 MG EC tablet Take 81 mg by mouth Daily.   Taking   • atorvastatin (LIPITOR) 10 MG tablet Take 10 mg by mouth Daily.      • budesonide-formoterol (SYMBICORT) 160-4.5 MCG/ACT inhaler Inhale 2 puffs 2 (Two) Times a Day.      • bumetanide (BUMEX) 2 MG tablet Take 1 tablet by mouth 2 (Two) Times a Day for 30 days. 60 tablet 0    • carvedilol (COREG) 3.125 MG tablet Take 1 tablet by mouth 2 (Two) Times a Day for 30 days. 60 tablet 3    • clopidogrel (PLAVIX) 75 MG tablet Take 75 mg by mouth Daily.      • febuxostat (ULORIC) 40 MG tablet Take 1 tablet by mouth Daily for 30 days. 30 tablet 5    • FEROSUL 325 (65 Fe) MG tablet Take 325 mg by mouth Daily With Breakfast.   Taking   • levothyroxine (SYNTHROID,  LEVOTHROID) 50 MCG tablet Take 1 tablet by mouth Daily for 30 days. 30 tablet 3    • vitamin D (ERGOCALCIFEROL) 46569 units capsule capsule Take 50,000 Units by mouth Every 14 (Fourteen) Days.   Taking       Allergies  Patient has no known allergies.    Scheduled Meds:    albumin human 25 g Intravenous Once   aspirin 81 mg Oral Daily   atorvastatin 20 mg Oral Nightly   azithromycin 500 mg Intravenous Q24H   budesonide 0.5 mg Nebulization BID - RT   cefTRIAXone 1 g Intravenous Q24H   chlorhexidine 15 mL Mouth/Throat Q12H   insulin lispro 0-7 Units Subcutaneous Q6H   ipratropium-albuterol 3 mL Nebulization Q4H - RT   midodrine 10 mg Oral Q8H   pantoprazole 40 mg Intravenous QAM   sodium bicarbonate      sodium chloride 500 mL Intravenous Once     Continuous Infusions:    amiodarone 0.5 mg/min Last Rate: 0.5 mg/min (02/03/20 0432)   dexmedetomidine 0.2-1.5 mcg/kg/hr Last Rate: 0.3 mcg/kg/hr (02/02/20 0424)   DOBUTamine 2.5 mcg/kg/min Last Rate: 5 mcg/kg/min (02/03/20 0432)   heparin 12 Units/kg/hr Last Rate: 11 Units/kg/hr (02/03/20 0433)   midazolam 1-10 mg/hr Last Rate: Stopped (02/03/20 0945)   norepinephrine 0.02-0.5 mcg/kg/min Last Rate: 0.2 mcg/kg/min (02/03/20 0900)   phenylephrine 0.5-6 mcg/kg/min Last Rate: 6 mcg/kg/min (02/03/20 0930)   sodium chloride 100 mL/hr Last Rate: 100 mL/hr (02/03/20 0433)   vasopressin 0.01-0.03 Units/min Last Rate: 0.03 Units/min (02/03/20 0434)     PRN Meds:  dextrose  •  dextrose  •  fentanyl  •  glucagon (human recombinant)  •  heparin  •  heparin  •  insulin lispro **AND** insulin lispro  •  ipratropium-albuterol  •  midazolam  •  ondansetron  •  sodium chloride  •  sodium chloride    Lab Results (last 24 hours)     Procedure Component Value Units Date/Time    Procalcitonin [[id number]]  (Abnormal) Collected:  02/03/20 0503    Specimen:  Blood Updated:  02/03/20 0921     Procalcitonin 1.93 ng/mL     Narrative:       As a Marker for Sepsis (Non-Neonates):   1. <0.5 ng/mL  "represents a low risk of severe sepsis and/or septic shock.  1. >2 ng/mL represents a high risk of severe sepsis and/or septic shock.    As a Marker for Lower Respiratory Tract Infections that require antibiotic therapy:  PCT on Admission     Antibiotic Therapy             6-12 Hrs later  > 0.5                Strongly Recommended            >0.25 - <0.5         Recommended  0.1 - 0.25           Discouraged                   Remeasure/reassess PCT  <0.1                 Strongly Discouraged          Remeasure/reassess PCT      As 28 day mortality risk marker: \"Change in Procalcitonin Result\" (> 80 % or <=80 %) if Day 0 (or Day 1) and Day 4 values are available. Refer to [email or web address]   Change in PCT <=80 %   A decrease of PCT levels below or equal to 80 % defines a positive change in PCT test result representing a higher risk for 28-day all-cause mortality of patients diagnosed with severe sepsis or septic shock.  Change in PCT > 80 %   A decrease of PCT levels of more than 80 % defines a negative change in PCT result representing a lower risk for 28-day all-cause mortality of patients diagnosed with severe sepsis or septic shock.                Results may be falsely decreased if patient taking Biotin.     Cortisol [[id number]] Collected:  02/03/20 0503    Specimen:  Blood Updated:  02/03/20 0859    POC Glucose Once [[id number]]  (Abnormal) Collected:  02/03/20 0736    Specimen:  Blood Updated:  02/03/20 0742     Glucose 118 mg/dL      Comment: Serial Number: [id number]:  [id number]       CBC & Differential [[id number]] Collected:  02/03/20 0504    Specimen:  Blood Updated:  02/03/20 0704    Narrative:       The following orders were created for panel order CBC & Differential.  Procedure                               Abnormality         Status                     ---------                               -----------         ------                     CBC Auto Differential[[id number]]        " Abnormal            Final result                 Please view results for these tests on the individual orders.    CBC Auto Differential [[id number]]  (Abnormal) Collected:  02/03/20 0504    Specimen:  Blood Updated:  02/03/20 0704     WBC 13.20 10*3/mm3      RBC 4.13 10*6/mm3      Hemoglobin 14.9 g/dL      Hematocrit 43.9 %      .2 fL      MCH 36.1 pg      MCHC 34.0 g/dL      RDW 15.1 %      RDW-SD 55.1 fl      MPV 12.3 fL      Platelets 88 10*3/mm3     Manual Differential [[id number]]  (Abnormal) Collected:  02/03/20 0504    Specimen:  Blood Updated:  02/03/20 0704     Neutrophil % 87.0 %      Lymphocyte % 7.0 %      Monocyte % 6.0 %      Neutrophils Absolute 11.48 10*3/mm3      Lymphocytes Absolute 0.92 10*3/mm3      Monocytes Absolute 0.79 10*3/mm3      nRBC 8.0 /100 WBC      Anisocytosis Slight/1+     Crenated RBC's Slight/1+     Macrocytes Slight/1+     Poikilocytes Slight/1+     WBC Morphology Normal     Large Platelets Slight/1+    Renal Function Panel [[id number]]  (Abnormal) Collected:  02/03/20 0503    Specimen:  Blood Updated:  02/03/20 0557     Glucose 159 mg/dL      BUN 89 mg/dL      Creatinine 4.71 mg/dL      Sodium 134 mmol/L      Potassium 4.5 mmol/L      Chloride 95 mmol/L      CO2 19.0 mmol/L      Calcium 8.6 mg/dL      Albumin 2.80 g/dL      Phosphorus 7.1 mg/dL      Anion Gap 20.0 mmol/L      BUN/Creatinine Ratio 18.9     eGFR Non African Amer 12 mL/min/1.73      Comment: <15 Indicative of kidney failure.        eGFR   Amer --     Comment: <15 Indicative of kidney failure.       Narrative:       GFR Normal >60  Chronic Kidney Disease <60  Kidney Failure <15      Lactic Acid, Plasma [[id number]]  (Abnormal) Collected:  02/03/20 0503    Specimen:  Blood Updated:  02/03/20 0556     Lactate 4.1 mmol/L     Troponin [[id number]]  (Abnormal) Collected:  02/03/20 0503    Specimen:  Blood Updated:  02/03/20 0556     Troponin T 0.439 ng/mL     Narrative:       Troponin T Reference Range:  <= 0.03  ng/mL-   Negative for AMI  >0.03 ng/mL-     Abnormal for myocardial necrosis.  Clinicians would have to utilize clinical acumen, EKG, Troponin and serial changes to determine if it is an Acute Myocardial Infarction or myocardial injury due to an underlying chronic condition.       Results may be falsely decreased if patient taking Biotin.      aPTT [[id number]]  (Normal) Collected:  02/03/20 0503    Specimen:  Blood Updated:  02/03/20 0528     PTT 62.3 seconds     POC Glucose Once [[id number]]  (Abnormal) Collected:  02/03/20 0507    Specimen:  Blood Updated:  02/03/20 0508     Glucose 145 mg/dL      Comment: Serial Number: [id number]:  [id number]       POC Lactate [[id number]]  (Abnormal) Collected:  02/03/20 0212    Specimen:  Blood Updated:  02/03/20 0441     Lactate 3.7 mmol/L      Comment: Serial Number: [id number]:  [id number]       Blood Gas, Arterial [[id number]]  (Abnormal) Collected:  02/03/20 0409    Specimen:  Arterial Blood Updated:  02/03/20 0411     Site Arterial Line     Binh's Test N/A     pH, Arterial 7.412 pH units      pCO2, Arterial 31.9 mm Hg      pO2, Arterial 99.9 mm Hg      HCO3, Arterial 20.3 mmol/L      Base Excess, Arterial -3.3 mmol/L      Comment: Serial Number: [id number]:  [id number]        O2 Saturation, Arterial 97.9 %      CO2 Content 21.3 mmol/L      Barometric Pressure for Blood Gas --     Comment: N/A        Modality Adult Vent     FIO2 40 %      Ventilator Mode ;AC     Set Tidal Volume 500     PEEP 5     Hemodilution No     Respiratory Rate 20    CBC (No Diff) [[id number]]  (Abnormal) Collected:  02/03/20 0236    Specimen:  Blood Updated:  02/03/20 0245     WBC 12.60 10*3/mm3      RBC 3.82 10*6/mm3      Hemoglobin 13.6 g/dL      Hematocrit 40.6 %      .3 fL      MCH 35.6 pg      MCHC 33.5 g/dL      RDW 15.2 %      RDW-SD 55.6 fl      MPV 12.2 fL      Platelets 85 10*3/mm3     Blood Gas, Arterial [[id number]]  (Abnormal) Collected:  02/03/20 0212    Specimen:  Arterial  Blood Updated:  02/03/20 0217     Site Arterial Line     Binh's Test N/A     pH, Arterial 7.449 pH units      pCO2, Arterial 24.0 mm Hg      pO2, Arterial 96.2 mm Hg      HCO3, Arterial 16.6 mmol/L      Base Excess, Arterial -5.5 mmol/L      Comment: Serial Number: [id number]:  [id number]        O2 Saturation, Arterial 98.0 %      CO2 Content 17.4 mmol/L      Barometric Pressure for Blood Gas --     Comment: N/A        Modality Adult Vent     FIO2 40 %      Ventilator Mode ;AC     Set Tidal Volume 500     PEEP 5     Hemodilution No     Respiratory Rate 20    POCT Electrolytes +HGB +HCT [[id number]]  (Abnormal) Collected:  02/03/20 0212    Specimen:  Blood Updated:  02/03/20 0217     Sodium 131 mmol/L      POC Potassium 4.3 mmol/L      Ionized Calcium 0.93 mmol/L      Comment: Serial Number: [id number]:  [id number]        Glucose 198 mg/dL      Hematocrit 40 %      Hemoglobin 13.6 g/dL     Troponin [[id number]]  (Abnormal) Collected:  02/03/20 0013    Specimen:  Blood Updated:  02/03/20 0048     Troponin T 0.398 ng/mL     Narrative:       Troponin T Reference Range:  <= 0.03 ng/mL-   Negative for AMI  >0.03 ng/mL-     Abnormal for myocardial necrosis.  Clinicians would have to utilize clinical acumen, EKG, Troponin and serial changes to determine if it is an Acute Myocardial Infarction or myocardial injury due to an underlying chronic condition.       Results may be falsely decreased if patient taking Biotin.      Lactic Acid, Plasma [[id number]]  (Abnormal) Collected:  02/03/20 0013    Specimen:  Blood Updated:  02/03/20 0048     Lactate 2.2 mmol/L     POC Glucose Once [[id number]]  (Abnormal) Collected:  02/03/20 0017    Specimen:  Blood Updated:  02/03/20 0018     Glucose 207 mg/dL      Comment: Serial Number: [id number]:  [id number]       Blood Culture - Blood, Arm, Right [[id number]] Collected:  01/31/20 1949    Specimen:  Blood from Arm, Right Updated:  02/02/20 2000     Blood Culture No growth at 2 days     Blood Culture - Blood, Arm, Left [[id number]] Collected:  01/1938    Specimen:  Blood from Arm, Left Updated:  02/02/20 1945     Blood Culture No growth at 2 days    Troponin [[id number]]  (Abnormal) Collected:  02/02/20 1833    Specimen:  Blood Updated:  02/02/20 1903     Troponin T 0.378 ng/mL     Narrative:       Troponin T Reference Range:  <= 0.03 ng/mL-   Negative for AMI  >0.03 ng/mL-     Abnormal for myocardial necrosis.  Clinicians would have to utilize clinical acumen, EKG, Troponin and serial changes to determine if it is an Acute Myocardial Infarction or myocardial injury due to an underlying chronic condition.       Results may be falsely decreased if patient taking Biotin.      Lactic Acid, Plasma [[id number]]  (Abnormal) Collected:  02/02/20 1833    Specimen:  Blood Updated:  02/02/20 1902     Lactate 2.4 mmol/L     POC Glucose Once [[id number]]  (Abnormal) Collected:  02/02/20 1620    Specimen:  Blood Updated:  02/02/20 1622     Glucose 150 mg/dL      Comment: Serial Number: [id number]:  [id number]       Troponin [[id number]]  (Abnormal) Collected:  02/02/20 1212    Specimen:  Blood Updated:  02/02/20 1248     Troponin T 0.365 ng/mL     Narrative:       Troponin T Reference Range:  <= 0.03 ng/mL-   Negative for AMI  >0.03 ng/mL-     Abnormal for myocardial necrosis.  Clinicians would have to utilize clinical acumen, EKG, Troponin and serial changes to determine if it is an Acute Myocardial Infarction or myocardial injury due to an underlying chronic condition.       Results may be falsely decreased if patient taking Biotin.      Lactic Acid, Plasma [[id number]]  (Abnormal) Collected:  02/02/20 1212    Specimen:  Blood Updated:  02/02/20 1242     Lactate 3.8 mmol/L     POC Glucose Once [[id number]]  (Abnormal) Collected:  02/02/20 1151    Specimen:  Blood Updated:  02/02/20 1158     Glucose 111 mg/dL      Comment: Serial Number: [id number]:  [id number]                 Palliative Assessment      Vital Signs (last 24 hours)       02/02 0700  -  02/03 0659 02/03 0700  -  02/03 1022   Most Recent    Temp (°F) 97.3 -  98.1      98.3     98.3 (36.8)    Heart Rate 100 -  128    108 -  111     108    Resp   20      20     20    BP   91/44       91/44    SpO2 (%) 72 -  100      98     98        Physical Exam   Constitutional:   Frail elderly male / intubated with sedation currently off but not following commands. Moving left arm and pulling at ETT   Eyes: Pupils are equal, round, and reactive to light.   Abdominal: Soft. Bowel sounds are normal.   NG tube with feeding. Bowel sounds positive x 4 quadrants   Musculoskeletal:   Intubated / moving arms    Neurological:   Moves upper extremities but does not follow commands   Skin:   Right lower leg cold to touch anterior portion. Faintly palpable DP pulse to right foot, no pulse to PT of right foot and palpable pulses PT/DP left foot.  Both legs purple discoloration with arms increased bruising   Vitals reviewed.        Decisional Capacity: no  Patient's understanding of illness: no  Patient goals of care:  Ongoing discussion with 2 sisters and brother at bedside        JAME Yoon

## 2020-02-03 NOTE — PROGRESS NOTES
"[facility]M/[facility] PROGRESS NOTE     History of present illness  The patient initially presented to the emergency department for evaluation of midsternal chest pain, cough, shortness of air, and hypotension.  He reports that he developed midsternal chest pain which \"feels like thousand needles\".  He also states he could not get his breath which led to a panic attack which led to increasing shortness of air.  He states that he has just generally felt bad for a couple of months and has had intermittent vomiting for a couple of days.  He is a very poor historian and cannot report that he has had any relieving or aggravating qualities to his symptoms.  He has also noted he states that his chest pain does not radiate into his arms, back, neck, or jaw.  He denies that he has had diarrhea, hematemesis, melena.  Reports that he has occasional dizziness.  He denies fever or ill contacts.  Of note the patient had been admitted to this facility on 1/25/2020 for evaluation of increased shortness of air.  At that time he was noted to have an elevated BNP and troponin.  Reports that he has had increasing bilateral lower extremity edema and that his diuretics had recently been increased.  He is a current, every day smoker and reports that he has had to increase his inhaler use recently.  He denies orthopnea.     The patient's wife states that he followed up with Dr. West in the office the day of admission and was found to be hypotensive.  He was given a prescription for \"a medicine to keep his blood pressure up\" however later in the day he continued to feel bad with generalized weakness and passing out and came to the emergency department for further evaluation and was admitted.  Nephrology and cardiology were consulted.  Per cardiology the patient has severe ischemic cardiomyopathy, severe left ventricular dysfunction, congestive heart failure NYHA class IV, possibly with cardiogenic shock.  Echocardiogram revealed severe left " "ventricular dysfunction and mitral regurgitation.  The patient developed a rapid heart rate which was a flutter/A. fib with RVR.  He also developed hypotension with a systolic blood pressure of 80.  Dobutamine and amiodarone were initiated and the patient moved to the intensive care unit for further evaluation and treatment.    SUBJECTIVE    2/2: Events of night noted, patient became unresponsive and hemodynamically unstable.  Now intubated and sedated.  Requiring multiple pressors for blood pressure support  2/3 remains on multiple pressors, increased pressor requirement, on mechanical ventilation       OBJECTIVE    BP 91/44   Pulse 102   Temp 98.3 °F (36.8 °C)   Resp 20   Ht 180.3 cm (71\")   Wt 77.3 kg (170 lb 6.6 oz)   SpO2 90%   BMI 23.77 kg/m²   Intake/Output last 3 shifts:  I/O last 3 completed shifts:  In: 6781 [I.V.:6695; Other:41; NG/GT:45]  Out: 330 [Urine:330]  Intake/Output this shift:  No intake/output data recorded.    Intake/Output Summary (Last 24 hours) at 2/3/2020 1503  Last data filed at 2/3/2020 0600  Gross per 24 hour   Intake 3571 ml   Output 30 ml   Net 3541 ml       Constitutional:  Well developed, well nourished, intubated and sedated, chronically ill-appearing  Eyes:  PERRL, conjunctiva normal   HENT:  Atraumatic, external ears normal, nose normal, oral ET tube.  Right nare NG tube  Neck-trachea midline.  No JVD.  Right IJ central line  Respiratory:   Breath sounds coarse but clear, no rales, no wheezing   Cardiovascular: A. fib with RVR, systolic ejection murmur, no gallops, no rubs   GI:  Soft, nondistended, normal bowel sounds   : Deferred  Musculoskeletal:  No edema, no clubbing, no deformities.  Fingertips slightly dusky  Integument: Poor turgor, no rash   Neurologic:  Intubated and sedated  Psychiatric:   Intubated and sedated     Scheduled Meds:    albumin human 25 g Intravenous Once   aspirin 81 mg Oral Daily   atorvastatin 20 mg Oral Nightly   azithromycin 500 mg " Intravenous Q24H   budesonide 0.5 mg Nebulization BID - RT   cefTRIAXone 1 g Intravenous Q24H   chlorhexidine 15 mL Mouth/Throat Q12H   insulin lispro 0-7 Units Subcutaneous Q6H   ipratropium-albuterol 3 mL Nebulization Q4H - RT   midodrine 10 mg Oral Q8H   pantoprazole 40 mg Intravenous QAM       Continuous Infusions:    amiodarone 0.5 mg/min Last Rate: 0.5 mg/min (02/03/20 1426)   dexmedetomidine 0.2-1.5 mcg/kg/hr Last Rate: 0.3 mcg/kg/hr (02/02/20 0424)   DOBUTamine 2.5 mcg/kg/min Last Rate: 5 mcg/kg/min (02/03/20 0432)   heparin 12 Units/kg/hr Last Rate: 11 Units/kg/hr (02/03/20 0433)   midazolam 1-10 mg/hr Last Rate: 1.5 mg/hr (02/03/20 1105)   norepinephrine 0.02-0.5 mcg/kg/min Last Rate: 0.35 mcg/kg/min (02/03/20 1146)   phenylephrine 0.5-6 mcg/kg/min Last Rate: 6 mcg/kg/min (02/03/20 1341)   sodium chloride 100 mL/hr Last Rate: 100 mL/hr (02/03/20 0433)   vasopressin 0.01-0.03 Units/min Last Rate: 0.03 Units/min (02/03/20 1146)       PRN Meds:dextrose  •  dextrose  •  fentanyl  •  glucagon (human recombinant)  •  heparin  •  heparin  •  insulin lispro **AND** insulin lispro  •  ipratropium-albuterol  •  midazolam  •  ondansetron  •  sodium chloride  •  sodium chloride     Data Review:  Lab Results (last 24 hours)     Procedure Component Value Units Date/Time    Troponin [[id number]]  (Abnormal) Collected:  02/03/20 1339    Specimen:  Blood Updated:  02/03/20 1410     Troponin T 0.271 ng/mL     Narrative:       Troponin T Reference Range:  <= 0.03 ng/mL-   Negative for AMI  >0.03 ng/mL-     Abnormal for myocardial necrosis.  Clinicians would have to utilize clinical acumen, EKG, Troponin and serial changes to determine if it is an Acute Myocardial Infarction or myocardial injury due to an underlying chronic condition.       Results may be falsely decreased if patient taking Biotin.      Lactic Acid, Plasma [[id number]]  (Abnormal) Collected:  02/03/20 1339    Specimen:  Blood Updated:  02/03/20 1408     Lactate  "4.7 mmol/L     POC Glucose Once [[id number]]  (Normal) Collected:  02/03/20 1155    Specimen:  Blood Updated:  02/03/20 1207     Glucose 98 mg/dL      Comment: Serial Number: [id number]:  [id number]       Respiratory Culture - Sputum, ET Suction [[id number]] Collected:  02/02/20 0458    Specimen:  Sputum from ET Suction Updated:  02/03/20 1137     Respiratory Culture Scant growth (1+) Normal Respiratory Sheila     Gram Stain Rare (1+) Epithelial cells per low power field      Many (4+) WBCs per low power field      Rare (1+) Mixed bacterial morphotypes seen on Gram Stain    Cortisol [[id number]] Collected:  02/03/20 0503    Specimen:  Blood Updated:  02/03/20 1106     Cortisol 25.44 mcg/dL     Narrative:       Cortisol Reference Ranges:    Cortisol 6AM - 10AM Range: 6.02-18.40 mcg/dl  Cortisol 4PM - 8PM Range: 2.68-10.50 mcg/dl      Results may be falsely increased if patient taking Biotin.      Procalcitonin [[id number]]  (Abnormal) Collected:  02/03/20 0503    Specimen:  Blood Updated:  02/03/20 0921     Procalcitonin 1.93 ng/mL     Narrative:       As a Marker for Sepsis (Non-Neonates):   1. <0.5 ng/mL represents a low risk of severe sepsis and/or septic shock.  1. >2 ng/mL represents a high risk of severe sepsis and/or septic shock.    As a Marker for Lower Respiratory Tract Infections that require antibiotic therapy:  PCT on Admission     Antibiotic Therapy             6-12 Hrs later  > 0.5                Strongly Recommended            >0.25 - <0.5         Recommended  0.1 - 0.25           Discouraged                   Remeasure/reassess PCT  <0.1                 Strongly Discouraged          Remeasure/reassess PCT      As 28 day mortality risk marker: \"Change in Procalcitonin Result\" (> 80 % or <=80 %) if Day 0 (or Day 1) and Day 4 values are available. Refer to http://[email or web address]-pct-calculator.com/   Change in PCT <=80 %   A decrease of PCT levels below or equal to 80 % defines a positive change in PCT test " result representing a higher risk for 28-day all-cause mortality of patients diagnosed with severe sepsis or septic shock.  Change in PCT > 80 %   A decrease of PCT levels of more than 80 % defines a negative change in PCT result representing a lower risk for 28-day all-cause mortality of patients diagnosed with severe sepsis or septic shock.                Results may be falsely decreased if patient taking Biotin.     POC Glucose Once [[id number]]  (Abnormal) Collected:  02/03/20 0736    Specimen:  Blood Updated:  02/03/20 0742     Glucose 118 mg/dL      Comment: Serial Number: [id number]:  [id number]       CBC & Differential [[id number]] Collected:  02/03/20 0504    Specimen:  Blood Updated:  02/03/20 0704    Narrative:       The following orders were created for panel order CBC & Differential.  Procedure                               Abnormality         Status                     ---------                               -----------         ------                     CBC Auto Differential[[id number]]        Abnormal            Final result                 Please view results for these tests on the individual orders.    CBC Auto Differential [[id number]]  (Abnormal) Collected:  02/03/20 0504    Specimen:  Blood Updated:  02/03/20 0704     WBC 13.20 10*3/mm3      RBC 4.13 10*6/mm3      Hemoglobin 14.9 g/dL      Hematocrit 43.9 %      .2 fL      MCH 36.1 pg      MCHC 34.0 g/dL      RDW 15.1 %      RDW-SD 55.1 fl      MPV 12.3 fL      Platelets 88 10*3/mm3     Manual Differential [[id number]]  (Abnormal) Collected:  02/03/20 0504    Specimen:  Blood Updated:  02/03/20 0704     Neutrophil % 87.0 %      Lymphocyte % 7.0 %      Monocyte % 6.0 %      Neutrophils Absolute 11.48 10*3/mm3      Lymphocytes Absolute 0.92 10*3/mm3      Monocytes Absolute 0.79 10*3/mm3      nRBC 8.0 /100 WBC      Anisocytosis Slight/1+     Crenated RBC's Slight/1+     Macrocytes Slight/1+     Poikilocytes Slight/1+     WBC Morphology  Normal     Large Platelets Slight/1+    Renal Function Panel [[id number]]  (Abnormal) Collected:  02/03/20 0503    Specimen:  Blood Updated:  02/03/20 0557     Glucose 159 mg/dL      BUN 89 mg/dL      Creatinine 4.71 mg/dL      Sodium 134 mmol/L      Potassium 4.5 mmol/L      Chloride 95 mmol/L      CO2 19.0 mmol/L      Calcium 8.6 mg/dL      Albumin 2.80 g/dL      Phosphorus 7.1 mg/dL      Anion Gap 20.0 mmol/L      BUN/Creatinine Ratio 18.9     eGFR Non African Amer 12 mL/min/1.73      Comment: <15 Indicative of kidney failure.        eGFR   Amer --     Comment: <15 Indicative of kidney failure.       Narrative:       GFR Normal >60  Chronic Kidney Disease <60  Kidney Failure <15      Lactic Acid, Plasma [[id number]]  (Abnormal) Collected:  02/03/20 0503    Specimen:  Blood Updated:  02/03/20 0556     Lactate 4.1 mmol/L     Troponin [[id number]]  (Abnormal) Collected:  02/03/20 0503    Specimen:  Blood Updated:  02/03/20 0556     Troponin T 0.439 ng/mL     Narrative:       Troponin T Reference Range:  <= 0.03 ng/mL-   Negative for AMI  >0.03 ng/mL-     Abnormal for myocardial necrosis.  Clinicians would have to utilize clinical acumen, EKG, Troponin and serial changes to determine if it is an Acute Myocardial Infarction or myocardial injury due to an underlying chronic condition.       Results may be falsely decreased if patient taking Biotin.      aPTT [[id number]]  (Normal) Collected:  02/03/20 0503    Specimen:  Blood Updated:  02/03/20 0528     PTT 62.3 seconds     POC Glucose Once [[id number]]  (Abnormal) Collected:  02/03/20 0507    Specimen:  Blood Updated:  02/03/20 0508     Glucose 145 mg/dL      Comment: Serial Number: [id number]:  [id number]       POC Lactate [[id number]]  (Abnormal) Collected:  02/03/20 0212    Specimen:  Blood Updated:  02/03/20 0441     Lactate 3.7 mmol/L      Comment: Serial Number: [id number]:  [id number]       Blood Gas, Arterial [[id number]]  (Abnormal) Collected:  02/03/20  0409    Specimen:  Arterial Blood Updated:  02/03/20 0411     Site Arterial Line     Binh's Test N/A     pH, Arterial 7.412 pH units      pCO2, Arterial 31.9 mm Hg      pO2, Arterial 99.9 mm Hg      HCO3, Arterial 20.3 mmol/L      Base Excess, Arterial -3.3 mmol/L      Comment: Serial Number: [id number]:  [id number]        O2 Saturation, Arterial 97.9 %      CO2 Content 21.3 mmol/L      Barometric Pressure for Blood Gas --     Comment: N/A        Modality Adult Vent     FIO2 40 %      Ventilator Mode ;AC     Set Tidal Volume 500     PEEP 5     Hemodilution No     Respiratory Rate 20    CBC (No Diff) [[id number]]  (Abnormal) Collected:  02/03/20 0236    Specimen:  Blood Updated:  02/03/20 0245     WBC 12.60 10*3/mm3      RBC 3.82 10*6/mm3      Hemoglobin 13.6 g/dL      Hematocrit 40.6 %      .3 fL      MCH 35.6 pg      MCHC 33.5 g/dL      RDW 15.2 %      RDW-SD 55.6 fl      MPV 12.2 fL      Platelets 85 10*3/mm3     Blood Gas, Arterial [[id number]]  (Abnormal) Collected:  02/03/20 0212    Specimen:  Arterial Blood Updated:  02/03/20 0217     Site Arterial Line     Binh's Test N/A     pH, Arterial 7.449 pH units      pCO2, Arterial 24.0 mm Hg      pO2, Arterial 96.2 mm Hg      HCO3, Arterial 16.6 mmol/L      Base Excess, Arterial -5.5 mmol/L      Comment: Serial Number: [id number]:  [id number]        O2 Saturation, Arterial 98.0 %      CO2 Content 17.4 mmol/L      Barometric Pressure for Blood Gas --     Comment: N/A        Modality Adult Vent     FIO2 40 %      Ventilator Mode ;AC     Set Tidal Volume 500     PEEP 5     Hemodilution No     Respiratory Rate 20    POCT Electrolytes +HGB +HCT [[id number]]  (Abnormal) Collected:  02/03/20 0212    Specimen:  Blood Updated:  02/03/20 0217     Sodium 131 mmol/L      POC Potassium 4.3 mmol/L      Ionized Calcium 0.93 mmol/L      Comment: Serial Number: [id number]:  [id number]        Glucose 198 mg/dL      Hematocrit 40 %      Hemoglobin 13.6 g/dL     Troponin [[id number]]   (Abnormal) Collected:  02/03/20 0013    Specimen:  Blood Updated:  02/03/20 0048     Troponin T 0.398 ng/mL     Narrative:       Troponin T Reference Range:  <= 0.03 ng/mL-   Negative for AMI  >0.03 ng/mL-     Abnormal for myocardial necrosis.  Clinicians would have to utilize clinical acumen, EKG, Troponin and serial changes to determine if it is an Acute Myocardial Infarction or myocardial injury due to an underlying chronic condition.       Results may be falsely decreased if patient taking Biotin.      Lactic Acid, Plasma [[id number]]  (Abnormal) Collected:  02/03/20 0013    Specimen:  Blood Updated:  02/03/20 0048     Lactate 2.2 mmol/L     POC Glucose Once [[id number]]  (Abnormal) Collected:  02/03/20 0017    Specimen:  Blood Updated:  02/03/20 0018     Glucose 207 mg/dL      Comment: Serial Number: [id number]:  [id number]       Blood Culture - Blood, Arm, Right [[id number]] Collected:  01/31/20 1949    Specimen:  Blood from Arm, Right Updated:  02/02/20 2000     Blood Culture No growth at 2 days    Blood Culture - Blood, Arm, Left [[id number]] Collected:  01/1938    Specimen:  Blood from Arm, Left Updated:  02/02/20 1945     Blood Culture No growth at 2 days    Troponin [[id number]]  (Abnormal) Collected:  02/02/20 1833    Specimen:  Blood Updated:  02/02/20 1903     Troponin T 0.378 ng/mL     Narrative:       Troponin T Reference Range:  <= 0.03 ng/mL-   Negative for AMI  >0.03 ng/mL-     Abnormal for myocardial necrosis.  Clinicians would have to utilize clinical acumen, EKG, Troponin and serial changes to determine if it is an Acute Myocardial Infarction or myocardial injury due to an underlying chronic condition.       Results may be falsely decreased if patient taking Biotin.      Lactic Acid, Plasma [[id number]]  (Abnormal) Collected:  02/02/20 1833    Specimen:  Blood Updated:  02/02/20 1902     Lactate 2.4 mmol/L     POC Glucose Once [[id number]]  (Abnormal) Collected:  02/02/20 1620    Specimen:   Blood Updated:  02/02/20 1622     Glucose 150 mg/dL      Comment: Serial Number: [id number]:  [id number]                Imaging:  Imaging Results (Last 72 Hours)     Procedure Component Value Units Date/Time    XR Chest 1 View [[id number]] Collected:  02/03/20 0056     Updated:  02/03/20 0310    Narrative:       EXAMINATION: XR CHEST 1 VW    DATE: 2/3/2020 2:24 AM    INDICATION:  Hypotension;    COMPARISON:  2/2/2020    FINDINGS:  Endotracheal tube and small right jugular venous catheter unchanged. Esophogastric tube tip past GE junction and off image redemonstrated.    Moderate bilateral pleural effusions, appears increased on the right.    Cardiomediastinal silhouette  unchanged with moderate cardiomegaly and atherosclerosis thoracic aorta. Single lead ICD. Status post median sternotomy.    Moderate pulmonary vascular engorgement.          Impression:         1.  Moderate right pleural effusion, increased.  2.  Increased pulmonary vascular congestion.  3.  No other change since radiograph yesterday          Electronically signed by:  Lit Lim M.D.    2/3/2020 12:58 AM    XR Abdomen KUB [[id number]] Collected:  02/02/20 0923     Updated:  02/02/20 0926    Narrative:       DATE OF EXAM:  2/2/2020 3:55 AM     PROCEDURE:  XR ABDOMEN KUB-     INDICATIONS:  NG tube placement     COMPARISON:  No Comparisons Available     TECHNIQUE:   Single radiographic view of the abdomen was obtained.        FINDINGS:  Enteric tube terminates in the mid upper abdomen, likely in the distal  gastric body. Visualized bowel gas pattern appears grossly unremarkable  without definite bowel dilatation. No definite ectopic bowel gas is  seen. There is right convex curvature of the lumbar spine with suspected  advanced degenerative changes.        Impression:       1.Enteric tube appears to terminate in the mid upper abdomen, likely in  the distal gastric body.  2.Bowel gas pattern appears grossly unremarkable.     Electronically  Signed By-DR. Jian Santiago MD On:2/2/2020 9:24 AM  This report was finalized on [id number] by DR. Jian Santiago MD.    XR Chest 1 View [[id number]] Collected:  02/02/20 0921     Updated:  02/02/20 0925    Narrative:       DATE OF EXAM:  2/2/2020 3:55 AM     PROCEDURE:  XR CHEST 1 VW-     INDICATIONS:  Intubation, chest pain, shortness of air     COMPARISON:  02/01/2020     TECHNIQUE:   Single radiographic view of the chest was obtained.     FINDINGS:  Endotracheal tube tip terminates at the level of the superior arch,  estimated to be approximately 4 cm above the mckinley.     Enteric tube extends into the upper abdomen, tip beyond the margins of  this exam.  Right IJ catheter terminates in the region of the superior  right atrium, as before. No definite pneumothorax is seen.  The heart is  enlarged. Patient is status post median sternotomy and CABG. ICD is  present. There is atherosclerosis of the aorta. There is left basilar  opacity with possible small left effusion, as before. No definite new  infiltrate or significant right effusion.       Impression:       1.Endotracheal tube tip is approximately 4 cm above the mckinley.  2.Enteric tube appears to extend into the upper abdomen, tip beyond the  margins of this exam.  3.Cardiomegaly with left effusion and left basilar opacities, as before.     Electronically Signed By-DR. Jian Santiago MD On:2/2/2020 9:23 AM  This report was finalized on [id number] by DR. Jian Santiago MD.    XR Chest 1 View [[id number]] Collected:  02/01/20 1840     Updated:  02/01/20 1842    Narrative:       Examination: XR CHEST 1 VW-     Date of Exam: 2/1/2020 6:09 PM     Indication: line placement; R07.9-Chest pain, unspecified;  R06.00-Dyspnea, unspecified; I50.9-Heart failure, unspecified;  N17.9-Acute kidney failure, unspecified.     Comparison: 01/31/2020.     Technique: Single radiographic view of the chest was obtained.     Findings:  There is a new right internal jugular central  venous catheter  terminating at the cavoatrial junction. There is moderate cardiomegaly.  There is a stable small left pleural effusion with left basilar airspace  disease/atelectasis. Stable left-sided ICD and evidence of prior median  sternotomy and CABG. The right lung remains clear. No evidence of  pneumothorax. No acute osseous abnormalities.       Impression:       New right internal jugular central venous catheter terminates at the  cavoatrial junction. The remainder of the examination is unchanged.     Electronically Signed By-Jim Jacobs On:2/1/2020 6:40 PM  This report was finalized on [id number] by  Jim Jacobs, .    XR Chest 1 View [[id number]] Collected:  01/31/20 2014     Updated:  01/31/20 2030    Narrative:       DATE OF EXAM:  1/31/2020 8:08 PM     PROCEDURE:  XR CHEST 1 VW-     INDICATIONS:  Chest pain, tobacco abuse, shortness of breath, heart disease.      COMPARISON:  01/23/2020.     TECHNIQUE:   Single radiographic view of the chest was obtained.     FINDINGS:  The heart is enlarged. There are postsurgical changes apparent which  includes a left-sided transvenous pacemaker/intracardiac defibrillator.  There is abnormal medial left basilar consolidation suspicious for  chronic scarring or atelectasis. The patient has a large hiatal hernia.  The right lung is clear. There are no pleural effusions.  There are  chronic age-related changes involving the bony thorax and thoracic  aorta.       Impression:          1. Cardiomegaly.  2. Abnormal medial left basilar consolidation suspicious for either  chronic scarring or atelectasis. There is also a hiatal hernia.     Electronically Signed By-Adrian Morris On:1/31/2020 8:27 PM  This report was finalized on [id number] by  Adrian Morris, .          ASSESSMENT      cardiogenic shock  -remains on multiple pressors, increased pressor requirement  - Cardiology following      Acute hypoxemic respiratory failure  -Etiology likely fluid volume overload  secondary to cardiogenic shock  -Intubated for mechanical ventilation support.  Not a weaning candidate due to hemodynamic instability    Acute bronchitis    -Respiratory viral panel negative  -Procalcitonin 0.47  - Sputum cx neg   -Empiric antibiotics with ceftriaxone, d/c Zithromax     Severe ischemic cardiomyopathy, EF 10% by echocardiogram 1/24/2020  -Cardiology following closely     Systolic CHF     A. fib/a flutter with RVR  -Amiodarone drip per cardiology  -Heparin drip     COPD, not in acute exacerbation  -Duo nebs scheduled  -Pulmicort nebs     Chronic kidney disease stage III  -Nephrology following closely     Coronary artery disease with history of CABG and stents  -Cardiology following     History of hypertension  -Antihypertensives on hold due to hypotension.  Restart when clinically appropriate     AICD in situ     Tobacco abuse 1 to 2 packs/day x 70 years  -Recommended cessation     PUD prophylaxis with Protonix  DVT prophylaxis with heparin drip     Right radial A-line 2/1/2020  Right IJ central line 2/1/2020     See orders. The plan was discussed with the patient's family at length.  They would like for the patient to be a DNR at this time.  We discussed that it would be reasonable to continue mechanical ventilation support for 7 days if the patient is unable to be extubated by that time goals of care will be readdressed.        Patient remains critically ill.  Total critical care time spent is 32 minutes which does not include any time for procedures.   Devon Ariza MD  Pulmonary and [facility]  [facility]       Addendum: Family decided to change code status to comfort measures only.

## 2020-02-03 NOTE — SIGNIFICANT NOTE
DNR / Comfort Measures    Palliative care  met with family to assess for goals of care.  Family reports they are pursuing comfort measures and had questions about  arrangements.  D/w care coordination, resources for  homes and cremation were provided to family.  At time of contact pt's family reported that they would reach out if they needed more assistance.  Emotional support provided.  Will continue to follow.  Thank you for the consult.       20 1600   PC Encounter Information   Palliative Care Patient? yes   Referral Date 20   Referral Time 0843   Date of Initial Encounter with a Palliative Care Provider 20   Time of initial encounter with a Palliative Care Provider 1030   Time Required for Initial Referral 30 mins   Additional Visit/Time Required to Achieve Results 15 mins   Patient Unit at Time of Referral [facility]   Patient Unit Specialty at Time of Referral critical care   Primary Diagnosis Leading to PC Consult cardiovascular   Code Status at Time of Consult DNR/DNI   Palliative Care Team Members Involved in Consultation nurse practitioner;   Screening Status/Interventions   Psychosocial Needs neg   Spiritual Needs unable   Goals of Care/ACP neg   Palliative Care Acuity   Psychosocial Acuity 1   Health Care Directives/Treatment Preferences   Advance Directive Document on Chart at Time of Consult no   Pre-existing AND/MOST/POLST Order No   Advance Directive Status Patient does not have advance directive   Surrogate Decision Maker addressed but unable to confirm   Goals of Care/Treatment Preferences (initial assessment and clinical changes) Offered/Attempted   Treatment Preferences comfort measures   Code Status Discussed yes   POLST/MOST Initiated no   Outcomes   Code Status After Consult DNR/DNI   Number of Family Meetings 3

## 2020-02-03 NOTE — PROGRESS NOTES
Discharge Planning Assessment  [facility]     Patient Name: Luis E Rahman  MRN: [id number]  Today's Date: 2/3/2020    Admit Date: 1/31/2020    Discharge Needs Assessment     Row Name 02/03/20 1436       Living Environment    Lives With  alone    Current Living Arrangements  home/apartment/condo    Primary Care Provided by  self    Provides Primary Care For  no one;no one, unable/limited ability to care for self    Family Caregiver if Needed  sibling(s)    Able to Return to Prior Arrangements  no       Resource/Environmental Concerns    Resource/Environmental Concerns  none       Transition Planning    Patient/Family Anticipates Transition to  other (see comments)    Patient/Family Anticipated Services at Transition  none    Transportation Anticipated  family or friend will provide       Discharge Needs Assessment    Equipment Currently Used at Home  none    Provided post acute provider list?  Refused    Refused Provider List Comment  sister says they are going to withdraw life support in a few days.     Discharge Coordination/Progress  CM asked SW to see family regarding possible needs.        Discharge Plan     Row Name 02/03/20 1436       Plan    Plan  Patient on ventilator, sister says they are going to withdraw life support in a few days. Sister states that patient wants ventilator removed after a few days.     Patient/Family in Agreement with Plan  yes    Plan Comments  CM asked LSW to see family and patient regarding possible needs. CM spoke to sister regarding possible needs for Hospice. Sister denied any need for hospice. Barriers to discharge: Patient intubated at this time.           Demographic Summary     Row Name 02/03/20 1433       General Information    Admission Type  inpatient    Arrived From  emergency department    Referral Source  other (see comments)    Reason for Consult  discharge planning    Preferred Language  English     Used During This Interaction  no       Contact Information     Permission Granted to Share Info With          Functional Status     Row Name 02/03/20 1434       Functional Status    Usual Activity Tolerance  fair    Current Activity Tolerance  other (see comments)    Functional Status Comments  Patient currently sedated on the ventilator       Mental Status Summary    Recent Changes in Mental Status/Cognitive Functioning  no changes       Employment/    Employment Status  disabled        Psychosocial     Row Name 02/03/20 1435       Values/Beliefs    Spiritual, Cultural Beliefs, Sikh Practices, Values that Affect Care  no       Intellectual Performance WDL    Level of Consciousness  Responds to Pain       Coping/Stress    Sources of Support  other family members 2 sisters and a brother    Reaction to Health Status  anticipatory grief       C-SSRS (Screen-Recent) Past Month    Q1 Wished to be Dead (Past Month)  no    Q2 Suicidal Thoughts (Past Month)  no    Q6 Suicide Behavior (Lifetime)  no       Violence Risk    Feels Like Hurting Others  no    Previous Attempt to Harm Others  no        Abuse/Neglect     Row Name 02/03/20 1436       Personal Safety    Feels Unsafe at Home or Work/School  no    Feels Threatened by Someone  no    Does Anyone Try to Keep You From Having Contact with Others or Doing Things Outside Your Home?  no    Physical Signs of Abuse Present  no            Anna Nae[place] [address]   [phone]  office  [phone]  fax  [email or web address]  [facility]

## 2020-02-04 LAB
BACTERIA SPEC RESP CULT: NORMAL
GRAM STN SPEC: NORMAL

## 2020-02-04 NOTE — PROGRESS NOTES
Case Management Discharge Note      Final Note:     Provided post acute provider list?: Refused           Final Discharge Disposition Code: 20 -

## 2020-02-05 LAB
BACTERIA SPEC AEROBE CULT: NORMAL
BACTERIA SPEC AEROBE CULT: NORMAL

## 2020-02-07 NOTE — DISCHARGE SUMMARY
"Pulmonary/ Critical Care/ sleep medicine discharge summary Note    Date of Discharge:  2/7/2020    Discharge Diagnosis: cardiogenic shock. Acute hypoxemic respiratory failure, Severe ischemic cardiomyopathy, EF 10%,     Presenting Problem/History of Present Illness  Active Hospital Problems    Diagnosis  POA   • Chest pain [R07.9]  Yes      Resolved Hospital Problems   No resolved problems to display.        Hospital Course  Patient is a 80 y.o. male     The patient initially presented to the emergency department for evaluation of midsternal chest pain, cough, shortness of air, and hypotension.  He reports that he developed midsternal chest pain which \"feels like thousand needles\".  He also states he could not get his breath which led to a panic attack which led to increasing shortness of air.  He states that he has just generally felt bad for a couple of months and has had intermittent vomiting for a couple of days.  He is a very poor historian and cannot report that he has had any relieving or aggravating qualities to his symptoms.  He has also noted he states that his chest pain does not radiate into his arms, back, neck, or jaw.  He denies that he has had diarrhea, hematemesis, melena.  Reports that he has occasional dizziness.  He denies fever or ill contacts.  Of note the patient had been admitted to this facility on 1/25/2020 for evaluation of increased shortness of air.  At that time he was noted to have an elevated BNP and troponin.  Reports that he has had increasing bilateral lower extremity edema and that his diuretics had recently been increased.  He is a current, every day smoker and reports that he has had to increase his inhaler use recently.  He denies orthopnea.     The patient's wife states that he followed up with Dr. West in the office the day of admission and was found to be hypotensive.  He was given a prescription for \"a medicine to keep his blood pressure up\" however later in the day he " continued to feel bad with generalized weakness and passing out and came to the emergency department for further evaluation and was admitted.  Nephrology and cardiology were consulted.  Per cardiology the patient has severe ischemic cardiomyopathy, severe left ventricular dysfunction, congestive heart failure NYHA class IV, possibly with cardiogenic shock.  Echocardiogram revealed severe left ventricular dysfunction and mitral regurgitation.  The patient developed a rapid heart rate which was a flutter/A. fib with RVR.  He also developed hypotension with a systolic blood pressure of 80.  Dobutamine and amiodarone were initiated and the patient moved to the intensive care unit for further evaluation and treatment.     2/2: Events of night noted, patient became unresponsive and hemodynamically unstable.  Now intubated and sedated.  Requiring multiple pressors for blood pressure support  2/3 remains on multiple pressors, increased pressor requirement, on mechanical ventilation         cardiogenic shock  - on multiple pressors, increased pressor requirement  - Cardiology following      Acute hypoxemic respiratory failure  -Etiology likely fluid volume overload secondary to cardiogenic shock  -Intubated for mechanical ventilation support.  Not a weaning candidate due to hemodynamic instability     Acute bronchitis    -Respiratory viral panel negative  -Procalcitonin 0.47  - Sputum cx neg   -Empiric antibiotics with ceftriaxone,  Zithromax     Severe ischemic cardiomyopathy, EF 10% by echocardiogram 1/24/2020  -Cardiology following closely     Systolic CHF     A. fib/a flutter with RVR  -Amiodarone drip per cardiology  -Heparin drip     COPD, not in acute exacerbation  -Duo nebs scheduled  -Pulmicort nebs     Chronic kidney disease stage III  -Nephrology following closely     Coronary artery disease with history of CABG and stents  -Cardiology following     History of hypertension  -Antihypertensives on hold due to  hypotension.  Restart when clinically appropriate     AICD in situ     Tobacco abuse 1 to 2 packs/day x 70 years  -Recommended cessation     PUD prophylaxis with Protonix  DVT prophylaxis with heparin drip      Right radial A-line 2/1/2020  Right IJ central line 2/1/2020     The plan was discussed with the patient's family at length.  DNR as per family wishes.  Code was changed to comfort by family and patient passed.        Procedures Performed         Labs/radiological studies:  Lab Results (last 72 hours)     Procedure Component Value Units Date/Time    [place] Draw [[id number]] Collected:  01/1940    Specimen:  Blood Updated:  02/03/20 1719    Narrative:       The following orders were created for panel order [place] Draw.  Procedure                               Abnormality         Status                     ---------                               -----------         ------                     Light Blue Top[[id number]]                                   Final result               Green Top (Gel)[[id number]]                                                             Lavender Top[[id number]]                                     Final result               Gold Top - SST[[id number]]                                   Final result                 Please view results for these tests on the individual orders.    [place] Draw [[id number]] Collected:  01/1940    Specimen:  Blood Updated:  02/03/20 1719    Narrative:       The following orders were created for panel order [place] Draw.  Procedure                               Abnormality         Status                     ---------                               -----------         ------                     Light Blue Top[[id number]]                                   Final result               Green Top (Gel)[[id number]]                                  Final result               Lavender Top[[id number]]                                                                 Gold Top - SST[[id number]]                                                                Please view results for these tests on the individual orders.    POC Glucose Once [[id number]]  (Abnormal) Collected:  02/03/20 1604    Specimen:  Blood Updated:  02/03/20 1606     Glucose 108 mg/dL      Comment: Serial Number: [id number]:  [id number]       Troponin [[id number]]  (Abnormal) Collected:  02/03/20 1339    Specimen:  Blood Updated:  02/03/20 1410     Troponin T 0.271 ng/mL     Narrative:       Troponin T Reference Range:  <= 0.03 ng/mL-   Negative for AMI  >0.03 ng/mL-     Abnormal for myocardial necrosis.  Clinicians would have to utilize clinical acumen, EKG, Troponin and serial changes to determine if it is an Acute Myocardial Infarction or myocardial injury due to an underlying chronic condition.       Results may be falsely decreased if patient taking Biotin.      Lactic Acid, Plasma [[id number]]  (Abnormal) Collected:  02/03/20 1339    Specimen:  Blood Updated:  02/03/20 1408     Lactate 4.7 mmol/L     POC Glucose Once [[id number]]  (Normal) Collected:  02/03/20 1155    Specimen:  Blood Updated:  02/03/20 1207     Glucose 98 mg/dL      Comment: Serial Number: [id number]:  [id number]       Cortisol [[id number]] Collected:  02/03/20 0503    Specimen:  Blood Updated:  02/03/20 1106     Cortisol 25.44 mcg/dL     Narrative:       Cortisol Reference Ranges:    Cortisol 6AM - 10AM Range: 6.02-18.40 mcg/dl  Cortisol 4PM - 8PM Range: 2.68-10.50 mcg/dl      Results may be falsely increased if patient taking Biotin.      Procalcitonin [[id number]]  (Abnormal) Collected:  02/03/20 0503    Specimen:  Blood Updated:  02/03/20 0921     Procalcitonin 1.93 ng/mL     Narrative:       As a Marker for Sepsis (Non-Neonates):   1. <0.5 ng/mL represents a low risk of severe sepsis and/or septic shock.  1. >2 ng/mL represents a high risk of severe sepsis and/or septic shock.    As a Marker for Lower Respiratory Tract  "Infections that require antibiotic therapy:  PCT on Admission     Antibiotic Therapy             6-12 Hrs later  > 0.5                Strongly Recommended            >0.25 - <0.5         Recommended  0.1 - 0.25           Discouraged                   Remeasure/reassess PCT  <0.1                 Strongly Discouraged          Remeasure/reassess PCT      As 28 day mortality risk marker: \"Change in Procalcitonin Result\" (> 80 % or <=80 %) if Day 0 (or Day 1) and Day 4 values are available. Refer to http://[email or web address] [email or web address][email or web address]/   Change in PCT <=80 %   A decrease of PCT levels below or equal to 80 % defines a positive change in PCT test result representing a higher risk for 28-day all-cause mortality of patients diagnosed with severe sepsis or septic shock.  Change in PCT > 80 %   A decrease of PCT levels of more than 80 % defines a negative change in PCT result representing a lower risk for 28-day all-cause mortality of patients diagnosed with severe sepsis or septic shock.                Results may be falsely decreased if patient taking Biotin.     POC Glucose Once [[id number]]  (Abnormal) Collected:  02/03/20 0736    Specimen:  Blood Updated:  02/03/20 0742     Glucose 118 mg/dL      Comment: Serial Number: [id number]:  [id number]       CBC & Differential [[id number]] Collected:  02/03/20 0504    Specimen:  Blood Updated:  02/03/20 0704    Narrative:       The following orders were created for panel order CBC & Differential.  Procedure                               Abnormality         Status                     ---------                               -----------         ------                     CBC Auto Differential[[id number]]        Abnormal            Final result                 Please view results for these tests on the individual orders.    CBC Auto Differential [[id number]]  (Abnormal) Collected:  02/03/20 0504    Specimen:  Blood Updated:  02/03/20 0704     WBC 13.20 10*3/mm3      RBC 4.13 " 10*6/mm3      Hemoglobin 14.9 g/dL      Hematocrit 43.9 %      .2 fL      MCH 36.1 pg      MCHC 34.0 g/dL      RDW 15.1 %      RDW-SD 55.1 fl      MPV 12.3 fL      Platelets 88 10*3/mm3     Manual Differential [[id number]]  (Abnormal) Collected:  02/03/20 0504    Specimen:  Blood Updated:  02/03/20 0704     Neutrophil % 87.0 %      Lymphocyte % 7.0 %      Monocyte % 6.0 %      Neutrophils Absolute 11.48 10*3/mm3      Lymphocytes Absolute 0.92 10*3/mm3      Monocytes Absolute 0.79 10*3/mm3      nRBC 8.0 /100 WBC      Anisocytosis Slight/1+     Crenated RBC's Slight/1+     Macrocytes Slight/1+     Poikilocytes Slight/1+     WBC Morphology Normal     Large Platelets Slight/1+    Renal Function Panel [[id number]]  (Abnormal) Collected:  02/03/20 0503    Specimen:  Blood Updated:  02/03/20 0557     Glucose 159 mg/dL      BUN 89 mg/dL      Creatinine 4.71 mg/dL      Sodium 134 mmol/L      Potassium 4.5 mmol/L      Chloride 95 mmol/L      CO2 19.0 mmol/L      Calcium 8.6 mg/dL      Albumin 2.80 g/dL      Phosphorus 7.1 mg/dL      Anion Gap 20.0 mmol/L      BUN/Creatinine Ratio 18.9     eGFR Non African Amer 12 mL/min/1.73      Comment: <15 Indicative of kidney failure.        eGFR   Amer --     Comment: <15 Indicative of kidney failure.       Narrative:       GFR Normal >60  Chronic Kidney Disease <60  Kidney Failure <15      Lactic Acid, Plasma [[id number]]  (Abnormal) Collected:  02/03/20 0503    Specimen:  Blood Updated:  02/03/20 0556     Lactate 4.1 mmol/L     Troponin [[id number]]  (Abnormal) Collected:  02/03/20 0503    Specimen:  Blood Updated:  02/03/20 0556     Troponin T 0.439 ng/mL     Narrative:       Troponin T Reference Range:  <= 0.03 ng/mL-   Negative for AMI  >0.03 ng/mL-     Abnormal for myocardial necrosis.  Clinicians would have to utilize clinical acumen, EKG, Troponin and serial changes to determine if it is an Acute Myocardial Infarction or myocardial injury due to an underlying chronic  condition.       Results may be falsely decreased if patient taking Biotin.      aPTT [[id number]]  (Normal) Collected:  02/03/20 0503    Specimen:  Blood Updated:  02/03/20 0528     PTT 62.3 seconds     POC Glucose Once [[id number]]  (Abnormal) Collected:  02/03/20 0507    Specimen:  Blood Updated:  02/03/20 0508     Glucose 145 mg/dL      Comment: Serial Number: [id number]:  [id number]       POC Lactate [[id number]]  (Abnormal) Collected:  02/03/20 0212    Specimen:  Blood Updated:  02/03/20 0441     Lactate 3.7 mmol/L      Comment: Serial Number: [id number]:  [id number]       Blood Gas, Arterial [[id number]]  (Abnormal) Collected:  02/03/20 0409    Specimen:  Arterial Blood Updated:  02/03/20 0411     Site Arterial Line     Binh's Test N/A     pH, Arterial 7.412 pH units      pCO2, Arterial 31.9 mm Hg      pO2, Arterial 99.9 mm Hg      HCO3, Arterial 20.3 mmol/L      Base Excess, Arterial -3.3 mmol/L      Comment: Serial Number: [id number]:  [id number]        O2 Saturation, Arterial 97.9 %      CO2 Content 21.3 mmol/L      Barometric Pressure for Blood Gas --     Comment: N/A        Modality Adult Vent     FIO2 40 %      Ventilator Mode ;AC     Set Tidal Volume 500     PEEP 5     Hemodilution No     Respiratory Rate 20    CBC (No Diff) [[id number]]  (Abnormal) Collected:  02/03/20 0236    Specimen:  Blood Updated:  02/03/20 0245     WBC 12.60 10*3/mm3      RBC 3.82 10*6/mm3      Hemoglobin 13.6 g/dL      Hematocrit 40.6 %      .3 fL      MCH 35.6 pg      MCHC 33.5 g/dL      RDW 15.2 %      RDW-SD 55.6 fl      MPV 12.2 fL      Platelets 85 10*3/mm3     Blood Gas, Arterial [[id number]]  (Abnormal) Collected:  02/03/20 0212    Specimen:  Arterial Blood Updated:  02/03/20 0217     Site Arterial Line     Binh's Test N/A     pH, Arterial 7.449 pH units      pCO2, Arterial 24.0 mm Hg      pO2, Arterial 96.2 mm Hg      HCO3, Arterial 16.6 mmol/L      Base Excess, Arterial -5.5 mmol/L      Comment: Serial Number:  [id number]:  [id number]        O2 Saturation, Arterial 98.0 %      CO2 Content 17.4 mmol/L      Barometric Pressure for Blood Gas --     Comment: N/A        Modality Adult Vent     FIO2 40 %      Ventilator Mode ;AC     Set Tidal Volume 500     PEEP 5     Hemodilution No     Respiratory Rate 20    POCT Electrolytes +HGB +HCT [[id number]]  (Abnormal) Collected:  02/03/20 0212    Specimen:  Blood Updated:  02/03/20 0217     Sodium 131 mmol/L      POC Potassium 4.3 mmol/L      Ionized Calcium 0.93 mmol/L      Comment: Serial Number: [id number]:  [id number]        Glucose 198 mg/dL      Hematocrit 40 %      Hemoglobin 13.6 g/dL     Troponin [[id number]]  (Abnormal) Collected:  02/03/20 0013    Specimen:  Blood Updated:  02/03/20 0048     Troponin T 0.398 ng/mL     Narrative:       Troponin T Reference Range:  <= 0.03 ng/mL-   Negative for AMI  >0.03 ng/mL-     Abnormal for myocardial necrosis.  Clinicians would have to utilize clinical acumen, EKG, Troponin and serial changes to determine if it is an Acute Myocardial Infarction or myocardial injury due to an underlying chronic condition.       Results may be falsely decreased if patient taking Biotin.      Lactic Acid, Plasma [[id number]]  (Abnormal) Collected:  02/03/20 0013    Specimen:  Blood Updated:  02/03/20 0048     Lactate 2.2 mmol/L     POC Glucose Once [[id number]]  (Abnormal) Collected:  02/03/20 0017    Specimen:  Blood Updated:  02/03/20 0018     Glucose 207 mg/dL      Comment: Serial Number: [id number]:  [id number]       Troponin [[id number]]  (Abnormal) Collected:  02/02/20 1833    Specimen:  Blood Updated:  02/02/20 1903     Troponin T 0.378 ng/mL     Narrative:       Troponin T Reference Range:  <= 0.03 ng/mL-   Negative for AMI  >0.03 ng/mL-     Abnormal for myocardial necrosis.  Clinicians would have to utilize clinical acumen, EKG, Troponin and serial changes to determine if it is an Acute Myocardial Infarction or myocardial injury due to an underlying  chronic condition.       Results may be falsely decreased if patient taking Biotin.      Lactic Acid, Plasma [[id number]]  (Abnormal) Collected:  02/02/20 1833    Specimen:  Blood Updated:  02/02/20 1902     Lactate 2.4 mmol/L     POC Glucose Once [[id number]]  (Abnormal) Collected:  02/02/20 1620    Specimen:  Blood Updated:  02/02/20 1622     Glucose 150 mg/dL      Comment: Serial Number: [id number]:  [id number]       Troponin [[id number]]  (Abnormal) Collected:  02/02/20 1212    Specimen:  Blood Updated:  02/02/20 1248     Troponin T 0.365 ng/mL     Narrative:       Troponin T Reference Range:  <= 0.03 ng/mL-   Negative for AMI  >0.03 ng/mL-     Abnormal for myocardial necrosis.  Clinicians would have to utilize clinical acumen, EKG, Troponin and serial changes to determine if it is an Acute Myocardial Infarction or myocardial injury due to an underlying chronic condition.       Results may be falsely decreased if patient taking Biotin.      Lactic Acid, Plasma [[id number]]  (Abnormal) Collected:  02/02/20 1212    Specimen:  Blood Updated:  02/02/20 1242     Lactate 3.8 mmol/L     POC Glucose Once [[id number]]  (Abnormal) Collected:  02/02/20 1151    Specimen:  Blood Updated:  02/02/20 1158     Glucose 111 mg/dL      Comment: Serial Number: [id number]:  [id number]       Light Blue Top [[id number]] Collected:  02/02/20 0623    Specimen:  Blood Updated:  02/02/20 0730     Extra Tube hold for add-on     Comment: Auto resulted       aPTT [[id number]]  (Abnormal) Collected:  02/02/20 0623    Specimen:  Blood Updated:  02/02/20 0730     PTT 56.7 seconds     Protime-INR [[id number]]  (Abnormal) Collected:  02/02/20 0623    Specimen:  Blood Updated:  02/02/20 0730     Protime 17.3 Seconds      INR 1.79    Lactic Acid, Plasma [[id number]]  (Abnormal) Collected:  02/02/20 0623    Specimen:  Blood Updated:  02/02/20 0656     Lactate 4.8 mmol/L     Vancomycin, Random [[id number]]  (Normal) Collected:  02/02/20 0623     Specimen:  Blood Updated:  02/02/20 0655     Vancomycin Random 9.20 mcg/mL     Phosphorus [[id number]]  (Abnormal) Collected:  02/02/20 0003    Specimen:  Blood Updated:  02/02/20 0514     Phosphorus 7.1 mg/dL      Comment: Result checked        Magnesium [[id number]]  (Abnormal) Collected:  02/02/20 0003    Specimen:  Blood Updated:  02/02/20 0507     Magnesium 2.9 mg/dL     Blood Gas, Arterial [[id number]]  (Abnormal) Collected:  02/02/20 0459    Specimen:  Arterial Blood Updated:  02/02/20 0501     Site Arterial Line     Binh's Test N/A     pH, Arterial 7.310 pH units      pCO2, Arterial 41.6 mm Hg      pO2, Arterial 350.4 mm Hg      HCO3, Arterial 21.0 mmol/L      Base Excess, Arterial -5.1 mmol/L      Comment: Serial Number: [id number]:  [id number]        O2 Saturation, Arterial 99.9 %      CO2 Content 22.2 mmol/L      Barometric Pressure for Blood Gas --     Comment: N/A        Modality Adult Vent     FIO2 100 %      Ventilator Mode ;AC     Set Tidal Volume 500     PEEP 5     Hemodilution No     Respiratory Rate 20    Comprehensive Metabolic Panel [[id number]]  (Abnormal) Collected:  02/02/20 0339    Specimen:  Blood Updated:  02/02/20 0418     Glucose 177 mg/dL      BUN 87 mg/dL      Creatinine 4.55 mg/dL      Sodium 131 mmol/L      Potassium 5.5 mmol/L      Chloride 93 mmol/L      CO2 15.0 mmol/L      Comment: Result checked         Calcium 8.4 mg/dL      Total Protein 5.6 g/dL      Albumin 3.20 g/dL      ALT (SGPT) 166 U/L      AST (SGOT) 174 U/L      Alkaline Phosphatase 113 U/L      Total Bilirubin 1.8 mg/dL      eGFR Non African Amer 12 mL/min/1.73      Comment: <15 Indicative of kidney failure.        eGFR   Amer --     Comment: <15 Indicative of kidney failure.        Globulin 2.4 gm/dL      A/G Ratio 1.3 g/dL      BUN/Creatinine Ratio 19.1     Anion Gap 23.0 mmol/L     Narrative:       GFR Normal >60  Chronic Kidney Disease <60  Kidney Failure <15      CBC & Differential [[id number]] Collected:   02/02/20 0338    Specimen:  Blood Updated:  02/02/20 0417    Narrative:       The following orders were created for panel order CBC & Differential.  Procedure                               Abnormality         Status                     ---------                               -----------         ------                     CBC Auto Differential[[id number]]        Abnormal            Final result                 Please view results for these tests on the individual orders.    CBC Auto Differential [[id number]]  (Abnormal) Collected:  02/02/20 0338    Specimen:  Blood Updated:  02/02/20 0417     WBC 11.80 10*3/mm3      RBC 4.10 10*6/mm3      Hemoglobin 14.7 g/dL      Hematocrit 43.8 %      .7 fL      MCH 35.8 pg      MCHC 33.5 g/dL      RDW 15.0 %      RDW-SD 55.6 fl      MPV 12.2 fL      Platelets 94 10*3/mm3     Scan Slide [[id number]] Collected:  02/02/20 0338    Specimen:  Blood Updated:  02/02/20 0417     Scan Slide --     Comment: See Manual Differential Results       Manual Differential [[id number]]  (Abnormal) Collected:  02/02/20 0338    Specimen:  Blood Updated:  02/02/20 0417     Neutrophil % 72.0 %      Lymphocyte % 9.0 %      Monocyte % 7.0 %      Basophil % 1.0 %      Bands %  11.0 %      Neutrophils Absolute 9.79 10*3/mm3      Lymphocytes Absolute 1.06 10*3/mm3      Monocytes Absolute 0.83 10*3/mm3      Basophils Absolute 0.12 10*3/mm3      nRBC 2.0 /100 WBC      Anisocytosis Slight/1+     Polychromasia Slight/1+     Toxic Granulation Slight/1+     Vacuolated Neutrophils Slight/1+     Giant Platelets Slight/1+    BNP [[id number]]  (Abnormal) Collected:  02/02/20 0339    Specimen:  Blood Updated:  02/02/20 0416     proBNP 51,192.0 pg/mL     Narrative:       Among patients with dyspnea, NT-proBNP is highly sensitive for the detection of acute congestive heart failure. In addition NT-proBNP of <300 pg/ml effectively rules out acute congestive heart failure with 99% negative predictive value.    Results may  be falsely decreased if patient taking Biotin.      Troponin [[id number]]  (Abnormal) Collected:  02/02/20 0339    Specimen:  Blood Updated:  02/02/20 0412     Troponin T 0.363 ng/mL     Narrative:       Troponin T Reference Range:  <= 0.03 ng/mL-   Negative for AMI  >0.03 ng/mL-     Abnormal for myocardial necrosis.  Clinicians would have to utilize clinical acumen, EKG, Troponin and serial changes to determine if it is an Acute Myocardial Infarction or myocardial injury due to an underlying chronic condition.       Results may be falsely decreased if patient taking Biotin.      Magnesium [[id number]]  (Abnormal) Collected:  02/02/20 0339    Specimen:  Blood Updated:  02/02/20 0410     Magnesium 2.9 mg/dL     aPTT [[id number]]  (Abnormal) Collected:  02/02/20 0338    Specimen:  Blood Updated:  02/02/20 0354     PTT 59.1 seconds     POC Lactate [[id number]]  (Abnormal) Collected:  02/02/20 0330    Specimen:  Blood Updated:  02/02/20 0335     Lactate 7.5 mmol/L      Comment: Serial Number: [id number]:  [id number]       POCT Electrolytes +HGB +HCT [[id number]]  (Abnormal) Collected:  02/02/20 0330    Specimen:  Blood Updated:  02/02/20 0335     Sodium 127 mmol/L      POC Potassium 5.0 mmol/L      Ionized Calcium 0.83 mmol/L      Comment: Serial Number: [id number]:  [id number]        Glucose 180 mg/dL      Hematocrit 47 %      Hemoglobin 15.9 g/dL     Blood Gas, Arterial [[id number]]  (Abnormal) Collected:  02/02/20 0330    Specimen:  Arterial Blood Updated:  02/02/20 0333     Site Arterial Line     Binh's Test N/A     pH, Arterial 7.278 pH units      pCO2, Arterial 34.1 mm Hg      pO2, Arterial 75.3 mm Hg      HCO3, Arterial 16.0 mmol/L      Base Excess, Arterial -9.8 mmol/L      Comment: Serial Number: [id number]:  [id number]        O2 Saturation, Arterial 93.2 %      CO2 Content 17.0 mmol/L      Barometric Pressure for Blood Gas --     Comment: N/A        Modality Bagging     FIO2 100 %      Hemodilution No    POC Glucose  Once [[id number]]  (Abnormal) Collected:  02/02/20 0316    Specimen:  Blood Updated:  02/02/20 0317     Glucose 163 mg/dL      Comment: Serial Number: [id number]:  [id number]       Lactic Acid, Plasma [[id number]]  (Abnormal) Collected:  02/02/20 0003    Specimen:  Blood Updated:  02/02/20 0045     Lactate 2.6 mmol/L     Troponin [[id number]]  (Abnormal) Collected:  02/02/20 0003    Specimen:  Blood Updated:  02/02/20 0045     Troponin T 0.316 ng/mL     Narrative:       Troponin T Reference Range:  <= 0.03 ng/mL-   Negative for AMI  >0.03 ng/mL-     Abnormal for myocardial necrosis.  Clinicians would have to utilize clinical acumen, EKG, Troponin and serial changes to determine if it is an Acute Myocardial Infarction or myocardial injury due to an underlying chronic condition.       Results may be falsely decreased if patient taking Biotin.      Basic Metabolic Panel [[id number]]  (Abnormal) Collected:  02/02/20 0003    Specimen:  Blood Updated:  02/02/20 0036     Glucose 156 mg/dL      BUN 92 mg/dL      Creatinine 4.73 mg/dL      Sodium 131 mmol/L      Potassium 4.9 mmol/L      Chloride 92 mmol/L      CO2 21.0 mmol/L      Calcium 8.7 mg/dL      eGFR   Amer --     Comment: <15 Indicative of kidney failure.        eGFR Non African Amer 12 mL/min/1.73      Comment: <15 Indicative of kidney failure.        BUN/Creatinine Ratio 19.5     Anion Gap 18.0 mmol/L     Narrative:       GFR Normal >60  Chronic Kidney Disease <60  Kidney Failure <15      aPTT [[id number]]  (Normal) Collected:  02/02/20 0003    Specimen:  Blood Updated:  02/02/20 0023     PTT 74.6 seconds     Procalcitonin [[id number]]  (Abnormal) Collected:  02/01/20 1653    Specimen:  Blood Updated:  02/01/20 2005     Procalcitonin 0.47 ng/mL     Narrative:       As a Marker for Sepsis (Non-Neonates):   1. <0.5 ng/mL represents a low risk of severe sepsis and/or septic shock.  1. >2 ng/mL represents a high risk of severe sepsis and/or septic shock.    As  "a Marker for Lower Respiratory Tract Infections that require antibiotic therapy:  PCT on Admission     Antibiotic Therapy             6-12 Hrs later  > 0.5                Strongly Recommended            >0.25 - <0.5         Recommended  0.1 - 0.25           Discouraged                   Remeasure/reassess PCT  <0.1                 Strongly Discouraged          Remeasure/reassess PCT      As 28 day mortality risk marker: \"Change in Procalcitonin Result\" (> 80 % or <=80 %) if Day 0 (or Day 1) and Day 4 values are available. Refer to [email or web address]/   Change in PCT <=80 %   A decrease of PCT levels below or equal to 80 % defines a positive change in PCT test result representing a higher risk for 28-day all-cause mortality of patients diagnosed with severe sepsis or septic shock.  Change in PCT > 80 %   A decrease of PCT levels of more than 80 % defines a negative change in PCT result representing a lower risk for 28-day all-cause mortality of patients diagnosed with severe sepsis or septic shock.                Results may be falsely decreased if patient taking Biotin.     aPTT [[id number]]  (Normal) Collected:  02/01/20 1856    Specimen:  Blood Updated:  02/01/20 1937     PTT 73.3 seconds     MRSA Screen, PCR - Swab, Nares [[id number]]  (Normal) Collected:  02/01/20 1652    Specimen:  Swab from Nares Updated:  02/01/20 1916     MRSA PCR No MRSA Detected    Blood Gas, Arterial [[id number]]  (Abnormal) Collected:  02/01/20 1814    Specimen:  Arterial Blood Updated:  02/01/20 1817     Site Arterial Line     Binh's Test N/A     pH, Arterial 7.446 pH units      pCO2, Arterial 31.1 mm Hg      pO2, Arterial 99.9 mm Hg      HCO3, Arterial 21.5 mmol/L      Base Excess, Arterial -1.6 mmol/L      Comment: Serial Number: [id number]:  [id number]        O2 Saturation, Arterial 98.1 %      CO2 Content 22.4 mmol/L      Barometric Pressure for Blood Gas --     Comment: N/A        Modality Cannula     FIO2 40 %      " Hemodilution No    Hepatic Function Panel [[id number]]  (Abnormal) Collected:  02/01/20 1653    Specimen:  Blood Updated:  02/01/20 1731     Total Protein 6.2 g/dL      Albumin 3.30 g/dL      ALT (SGPT) 103 U/L      AST (SGOT) 103 U/L      Comment: Specimen hemolyzed.  Results may be affected.        Alkaline Phosphatase 124 U/L      Total Bilirubin 2.1 mg/dL      Bilirubin, Direct 0.9 mg/dL      Comment: Specimen hemolyzed. Results may be affected.  Result checked         Bilirubin, Indirect 1.2 mg/dL     POC Glucose Once [[id number]]  (Abnormal) Collected:  02/01/20 1620    Specimen:  Blood Updated:  02/01/20 1623     Glucose 138 mg/dL      Comment: Serial Number: [id number]:  [id number]       Basic Metabolic Panel [[id number]]  (Abnormal) Collected:  02/01/20 1404    Specimen:  Blood Updated:  02/01/20 1440     Glucose 167 mg/dL      BUN 93 mg/dL      Creatinine 4.72 mg/dL      Sodium 133 mmol/L      Potassium 5.6 mmol/L      Chloride 92 mmol/L      CO2 22.0 mmol/L      Calcium 9.3 mg/dL      eGFR   Amer --     Comment: <15 Indicative of kidney failure.        eGFR Non African Amer 12 mL/min/1.73      Comment: <15 Indicative of kidney failure.        BUN/Creatinine Ratio 19.7     Anion Gap 19.0 mmol/L     Narrative:       GFR Normal >60  Chronic Kidney Disease <60  Kidney Failure <15      aPTT [[id number]]  (Abnormal) Collected:  02/01/20 1404    Specimen:  Blood Updated:  02/01/20 1427     PTT 59.6 seconds      Comment: Result checked        Uric Acid [[id number]]  (Abnormal) Collected:  02/01/20 0227    Specimen:  Blood Updated:  02/01/20 1300     Uric Acid 12.5 mg/dL     aPTT [[id number]]  (Abnormal) Collected:  02/01/20 0713    Specimen:  Blood Updated:  02/01/20 0730     PTT 76.7 seconds     Protime-INR [[id number]]  (Abnormal) Collected:  02/01/20 0713    Specimen:  Blood Updated:  02/01/20 0730     Protime 14.8 Seconds      INR 1.50    Troponin [[id number]]  (Abnormal) Collected:  02/01/20 0227     Specimen:  Blood Updated:  02/01/20 0322     Troponin T 0.286 ng/mL     Narrative:       Troponin T Reference Range:  <= 0.03 ng/mL-   Negative for AMI  >0.03 ng/mL-     Abnormal for myocardial necrosis.  Clinicians would have to utilize clinical acumen, EKG, Troponin and serial changes to determine if it is an Acute Myocardial Infarction or myocardial injury due to an underlying chronic condition.       Results may be falsely decreased if patient taking Biotin.      Basic Metabolic Panel [[id number]]  (Abnormal) Collected:  02/01/20 0227    Specimen:  Blood Updated:  02/01/20 0319     Glucose 114 mg/dL      BUN 83 mg/dL      Creatinine 4.34 mg/dL      Sodium 133 mmol/L      Potassium 5.3 mmol/L      Chloride 92 mmol/L      CO2 25.0 mmol/L      Calcium 10.1 mg/dL      eGFR   Amer --     Comment: <15 Indicative of kidney failure.        eGFR Non African Amer 13 mL/min/1.73      Comment: <15 Indicative of kidney failure.        BUN/Creatinine Ratio 19.1     Anion Gap 16.0 mmol/L     Narrative:       GFR Normal >60  Chronic Kidney Disease <60  Kidney Failure <15      aPTT [[id number]]  (Abnormal) Collected:  02/01/20 0227    Specimen:  Blood Updated:  02/01/20 0315     PTT 92.5 seconds     Lactic Acid, Reflex [[id number]]  (Abnormal) Collected:  01/31/20 2339    Specimen:  Blood Updated:  02/01/20 0007     Lactate 3.3 mmol/L     Lactic Acid, Reflex Timer (This will reflex a repeat order 3-3:15 hours after ordered.) [[id number]] Collected:  01/1940    Specimen:  Blood Updated:  01/31/20 2330     Extra Tube Hold for add-ons.     Comment: Auto resulted.       Troponin [[id number]]  (Abnormal) Collected:  01/31/20 2123    Specimen:  Blood from Arm, Right Updated:  01/31/20 2232     Troponin T 0.265 ng/mL     Narrative:       Troponin T Reference Range:  <= 0.03 ng/mL-   Negative for AMI  >0.03 ng/mL-     Abnormal for myocardial necrosis.  Clinicians would have to utilize clinical acumen, EKG, Troponin and serial  changes to determine if it is an Acute Myocardial Infarction or myocardial injury due to an underlying chronic condition.       Results may be falsely decreased if patient taking Biotin.      Respiratory Panel, PCR - Swab, Nasopharynx [[id number]]  (Normal) Collected:  01/31/20 1949    Specimen:  Swab from Nasopharynx Updated:  01/31/20 2127     ADENOVIRUS, PCR Not Detected     Coronavirus 229E Not Detected     Coronavirus HKU1 Not Detected     Coronavirus NL63 Not Detected     Coronavirus OC43 Not Detected     Human Metapneumovirus Not Detected     Human Rhinovirus/Enterovirus Not Detected     Influenza B PCR Not Detected     Parainfluenza Virus 1 Not Detected     Parainfluenza Virus 2 Not Detected     Parainfluenza Virus 3 Not Detected     Parainfluenza Virus 4 Not Detected     Bordetella pertussis pcr Not Detected     Influenza A H1 2009 PCR Not Detected     Chlamydophila pneumoniae PCR Not Detected     Mycoplasma pneumo by PCR Not Detected     Influenza A PCR Not Detected     Influenza A H3 Not Detected     Influenza A H1 Not Detected     RSV, PCR Not Detected    aPTT [[id number]]  (Abnormal) Collected:  01/1940    Specimen:  Blood Updated:  01/31/20 2053     PTT 23.3 seconds     Lavender Top [[id number]] Collected:  01/1940    Specimen:  Blood Updated:  01/31/20 2045     Extra Tube hold for add-on     Comment: Auto resulted       Gold Top - SST [[id number]] Collected:  01/1940    Specimen:  Blood Updated:  01/31/20 2045     Extra Tube Hold for add-ons.     Comment: Auto resulted.       Light Blue Top [[id number]] Collected:  01/1940    Specimen:  Blood Updated:  01/31/20 2045     Extra Tube hold for add-on     Comment: Auto resulted       Green Top (Gel) [[id number]] Collected:  01/1940    Specimen:  Blood Updated:  01/31/20 2045     Extra Tube Hold for add-ons.     Comment: Auto resulted.           Xr Chest 1 View    Result Date: 2/3/2020  1.  Moderate right pleural effusion, increased.  2.  Increased pulmonary vascular congestion. 3.  No other change since radiograph yesterday Electronically signed by:  Lit Lim M.D.  2/3/2020 12:58 AM    Xr Chest 1 View    Result Date: 2/2/2020  1.Endotracheal tube tip is approximately 4 cm above the mckinley. 2.Enteric tube appears to extend into the upper abdomen, tip beyond the margins of this exam. 3.Cardiomegaly with left effusion and left basilar opacities, as before.  Electronically Signed By-DR. Jian Santiago MD On:2/2/2020 9:23 AM This report was finalized on [id number] by DR. Jian Santiago MD.    Xr Chest 1 View    Result Date: 2/1/2020  New right internal jugular central venous catheter terminates at the cavoatrial junction. The remainder of the examination is unchanged.  Electronically Signed By-Jim Jacobs On:2/1/2020 6:40 PM This report was finalized on [id number] by  Jim Jacobs, .    Xr Chest 1 View    Result Date: 1/31/2020   1. Cardiomegaly. 2. Abnormal medial left basilar consolidation suspicious for either chronic scarring or atelectasis. There is also a hiatal hernia.  Electronically Signed By-Adrian Morris On:1/31/2020 8:27 PM This report was finalized on [id number] by  Adrian Morris, .    Xr Chest 1 View    Result Date: 1/24/2020   1. Cardiomegaly with mild pulmonary vascular congestion. 2. Question small left pleural effusion.  Electronically Signed By-Ethan Reynolds On:1/24/2020 7:16 AM This report was finalized on [id number] by  Ethan Reynolds, .    Us Renal Bilateral    Result Date: 1/24/2020  1.Right renal cysts measuring up to 2.9 cm. 2.Otherwise, unremarkable sonographic appearance of the kidneys.  Electronically Signed By-Kimberly Green On:1/24/2020 3:54 PM This report was finalized on [id number] by  Kimberly Green, .    Xr Abdomen Kub    Result Date: 2/2/2020  1.Enteric tube appears to terminate in the mid upper abdomen, likely in the distal gastric body. 2.Bowel gas pattern appears grossly unremarkable.   Electronically Signed By-DR. Jian Santiago MD On:2020 9:24 AM This report was finalized on [id number] by DR. Jian Santiago MD.      Consults:   Consults     Date and Time Order Name Status Description    2/3/2020 0843 Inpatient Vascular Surgery Consult Completed     2020 1431 Inpatient Intensivist Consult Completed     2020 0640 Inpatient Nephrology Consult Completed     2020 Cardiology (on-call MD unless specified) Completed     2020 0951 Inpatient Nephrology Consult Completed     2020 0951 Inpatient Cardiology Consult Completed     2020 0831 Inpatient Cardiology Consult Completed     2020 0028 Family Medicine Consult Completed             Condition on Discharge:  Passed          Discharge Disposition      Discharge Medications     Discharge Medications      ASK your doctor about these medications      Instructions Start Date   amiodarone 200 MG tablet  Commonly known as:  PACERONE   200 mg, Oral, Daily      ASPIR-LOW 81 MG EC tablet  Generic drug:  aspirin   81 mg, Oral, Daily      atorvastatin 10 MG tablet  Commonly known as:  LIPITOR   10 mg, Oral, Daily      budesonide-formoterol 160-4.5 MCG/ACT inhaler  Commonly known as:  SYMBICORT   2 puffs, Inhalation, 2 Times Daily - RT      bumetanide 2 MG tablet  Commonly known as:  BUMEX   2 mg, Oral, 2 Times Daily      carvedilol 3.125 MG tablet  Commonly known as:  COREG   3.125 mg, Oral, 2 Times Daily      clopidogrel 75 MG tablet  Commonly known as:  PLAVIX   75 mg, Oral, Daily      febuxostat 40 MG tablet  Commonly known as:  ULORIC   40 mg, Oral, Daily      FEROSUL 325 (65 FE) MG tablet  Generic drug:  ferrous sulfate   325 mg, Oral, Daily With Breakfast      levothyroxine 50 MCG tablet  Commonly known as:  SYNTHROID, LEVOTHROID   50 mcg, Oral, Daily      VENTOLIN  (90 Base) MCG/ACT inhaler  Generic drug:  albuterol sulfate HFA   1 puff, Inhalation, Every 6 Hours PRN      vitamin D 1.25 MG (48426 UT)  capsule capsule  Commonly known as:  ERGOCALCIFEROL   50,000 Units, Oral, Every 14 Days           Follow-up Appointments  No future appointments.

## 2020-02-12 PROCEDURE — 93010 ELECTROCARDIOGRAM REPORT: CPT | Performed by: INTERNAL MEDICINE

## (undated) DEVICE — CABL BIPOL W/ALLGTR CLIP/SM 12FT

## (undated) DEVICE — PACEMAKER CDS: Brand: MEDLINE INDUSTRIES, INC.

## (undated) DEVICE — 3M™ STERI-STRIP™ REINFORCED ADHESIVE SKIN CLOSURES, R1547, 1/2 IN X 4 IN (12 MM X 100 MM), 6 STRIPS/ENVELOPE: Brand: 3M™ STERI-STRIP™

## (undated) DEVICE — ELECTRD DEFIB M/FUNC PROPADZ RADIOL 2PK

## (undated) DEVICE — DRSNG WND BORDR/ADHS NONADHR/GZ LF 4X4IN STRL

## (undated) DEVICE — SUT SILK 2/0 FS BLK 18IN 685G

## (undated) DEVICE — UNDYED BRAIDED (POLYGLACTIN 910), SYNTHETIC ABSORBABLE SUTURE: Brand: COATED VICRYL

## (undated) DEVICE — VIOLET BRAIDED (POLYGLACTIN 910), SYNTHETIC ABSORBABLE SUTURE: Brand: COATED VICRYL

## (undated) DEVICE — 3M™ PATIENT PLATE, CORDED, SPLIT, LARGE, 40 PER CASE, 1179: Brand: 3M™

## (undated) DEVICE — SKIN AFFIX SURG ADHESIVE 72/CS 0.55ML: Brand: MEDLINE